# Patient Record
Sex: MALE | Race: WHITE | NOT HISPANIC OR LATINO | Employment: FULL TIME | URBAN - METROPOLITAN AREA
[De-identification: names, ages, dates, MRNs, and addresses within clinical notes are randomized per-mention and may not be internally consistent; named-entity substitution may affect disease eponyms.]

---

## 2017-01-06 ENCOUNTER — RADIANT APPOINTMENT (OUTPATIENT)
Dept: GENERAL RADIOLOGY | Facility: CLINIC | Age: 22
End: 2017-01-06
Attending: FAMILY MEDICINE
Payer: COMMERCIAL

## 2017-01-06 ENCOUNTER — OFFICE VISIT (OUTPATIENT)
Dept: FAMILY MEDICINE | Facility: CLINIC | Age: 22
End: 2017-01-06
Payer: COMMERCIAL

## 2017-01-06 VITALS
HEIGHT: 70 IN | OXYGEN SATURATION: 97 % | DIASTOLIC BLOOD PRESSURE: 70 MMHG | TEMPERATURE: 98 F | BODY MASS INDEX: 21.67 KG/M2 | HEART RATE: 75 BPM | WEIGHT: 151.4 LBS | SYSTOLIC BLOOD PRESSURE: 109 MMHG

## 2017-01-06 DIAGNOSIS — M43.6 NECK STIFFNESS: Primary | ICD-10-CM

## 2017-01-06 DIAGNOSIS — Z23 NEED FOR PROPHYLACTIC VACCINATION AND INOCULATION AGAINST INFLUENZA: ICD-10-CM

## 2017-01-06 PROCEDURE — 99213 OFFICE O/P EST LOW 20 MIN: CPT | Mod: 25 | Performed by: FAMILY MEDICINE

## 2017-01-06 PROCEDURE — 90686 IIV4 VACC NO PRSV 0.5 ML IM: CPT | Performed by: FAMILY MEDICINE

## 2017-01-06 PROCEDURE — 90471 IMMUNIZATION ADMIN: CPT | Performed by: FAMILY MEDICINE

## 2017-01-06 PROCEDURE — 72040 X-RAY EXAM NECK SPINE 2-3 VW: CPT

## 2017-01-06 ASSESSMENT — ANXIETY QUESTIONNAIRES
2. NOT BEING ABLE TO STOP OR CONTROL WORRYING: NOT AT ALL
1. FEELING NERVOUS, ANXIOUS, OR ON EDGE: SEVERAL DAYS
GAD7 TOTAL SCORE: 4
3. WORRYING TOO MUCH ABOUT DIFFERENT THINGS: NOT AT ALL
7. FEELING AFRAID AS IF SOMETHING AWFUL MIGHT HAPPEN: NOT AT ALL
5. BEING SO RESTLESS THAT IT IS HARD TO SIT STILL: NOT AT ALL
IF YOU CHECKED OFF ANY PROBLEMS ON THIS QUESTIONNAIRE, HOW DIFFICULT HAVE THESE PROBLEMS MADE IT FOR YOU TO DO YOUR WORK, TAKE CARE OF THINGS AT HOME, OR GET ALONG WITH OTHER PEOPLE: NOT DIFFICULT AT ALL
6. BECOMING EASILY ANNOYED OR IRRITABLE: SEVERAL DAYS

## 2017-01-06 ASSESSMENT — PATIENT HEALTH QUESTIONNAIRE - PHQ9: 5. POOR APPETITE OR OVEREATING: MORE THAN HALF THE DAYS

## 2017-01-06 NOTE — PROGRESS NOTES
"        SUBJECTIVE:                                                    Carrillo Aceves is a 21 year old male who presents to clinic today for the following health issues:      Feels like disc in neck has slipped forward--no pain associated with this         SUBJECTIVE:  Here today for some stiffness in his neck as above. The patient states he has no history of injury. Has been noting this for the past year. He feels as though his cervical vertebrae are not aligned and it's affecting range of motion, primarily backwards flexion. He really doesn't have any pain. Certainly nothing radiating down into his upper extremities.    Review of systems otherwise negative.  Past medical, family, and social history reviewed and updated in chart.    OBJECTIVE:  /70 mmHg  Pulse 75  Temp(Src) 98  F (36.7  C)  Ht 1.772 m (5' 9.75\")  Wt 68.675 kg (151 lb 6.4 oz)  BMI 21.87 kg/m2  SpO2 97%  Alert, pleasant, upbeat, and in no apparent discomfort.   Affect is a bit odd - somewhat flat  Neck - he has prominence to his T1 spinous process and the difference between this and C7 are what he is noting as \"slipping\"  But range of motion, including rotation, tilt, flexion seems fairly normal.  Upper extremities - normal CMS     XRAY - my independent reading of the films showed totally normal alignment of vertebral bodies. There is a definite step up between C7 and T1 in terms of spinous processes.     ASSESSMENT / PLAN:  (M43.6) Neck stiffness  (primary encounter diagnosis)  Comment: I discussed with the patient that this is fairly normal anatomy. He may have a slightly exaggerated difference between C7 and T1, but is difficult to know if this is what is led to some of the neck stiffness. We talked about potential options and I think he would primarily benefit from chiropractic therapy.  Plan: XR Cervical Spine 2/3 Views, CHIROPRACTIC         REFERRAL            (Z23) Need for prophylactic vaccination and inoculation against " influenza  Comment:   Plan: FLU VAC, SPLIT VIRUS IM > 3 YO (QUADRIVALENT)         [68595], Vaccine Administration, Initial         [07058]            Follow up as needed   SChadwick Cardona MD    (Chart documentation completed in part with Dragon voice-recognition software.  Even though reviewed some grammatical, spelling, and word errors may remain.)     Injectable Influenza Immunization Documentation    1.  Is the person to be vaccinated sick today?  No    2. Does the person to be vaccinated have an allergy to eggs or to a component of the vaccine?  No    3. Has the person to be vaccinated today ever had a serious reaction to influenza vaccine in the past?  No    4. Has the person to be vaccinated ever had Guillain-[place] syndrome?  No     Form completed by Cami Joseph CMA  January 6, 2017 10:27 AM

## 2017-01-07 ASSESSMENT — PATIENT HEALTH QUESTIONNAIRE - PHQ9: SUM OF ALL RESPONSES TO PHQ QUESTIONS 1-9: 5

## 2017-01-07 ASSESSMENT — ANXIETY QUESTIONNAIRES: GAD7 TOTAL SCORE: 4

## 2019-01-07 ENCOUNTER — OFFICE VISIT (OUTPATIENT)
Dept: FAMILY MEDICINE | Facility: CLINIC | Age: 24
End: 2019-01-07
Payer: COMMERCIAL

## 2019-01-07 VITALS
TEMPERATURE: 99.1 F | RESPIRATION RATE: 16 BRPM | SYSTOLIC BLOOD PRESSURE: 110 MMHG | WEIGHT: 160.3 LBS | HEIGHT: 69 IN | HEART RATE: 104 BPM | DIASTOLIC BLOOD PRESSURE: 68 MMHG | BODY MASS INDEX: 23.74 KG/M2

## 2019-01-07 DIAGNOSIS — Z23 NEED FOR VACCINATION: ICD-10-CM

## 2019-01-07 DIAGNOSIS — Z00.00 ROUTINE HISTORY AND PHYSICAL EXAMINATION OF ADULT: Primary | ICD-10-CM

## 2019-01-07 DIAGNOSIS — F33.1 MODERATE EPISODE OF RECURRENT MAJOR DEPRESSIVE DISORDER (H): ICD-10-CM

## 2019-01-07 LAB
CHOLEST SERPL-MCNC: 110 MG/DL
HBA1C MFR BLD: 4.6 % (ref 0–5.6)
HDLC SERPL-MCNC: 47 MG/DL
LDLC SERPL CALC-MCNC: 21 MG/DL
NONHDLC SERPL-MCNC: 63 MG/DL
TRIGL SERPL-MCNC: 209 MG/DL
TSH SERPL DL<=0.005 MIU/L-ACNC: 1.05 MU/L (ref 0.4–4)

## 2019-01-07 PROCEDURE — 90651 9VHPV VACCINE 2/3 DOSE IM: CPT | Performed by: PHYSICIAN ASSISTANT

## 2019-01-07 PROCEDURE — 83036 HEMOGLOBIN GLYCOSYLATED A1C: CPT | Performed by: PHYSICIAN ASSISTANT

## 2019-01-07 PROCEDURE — 99214 OFFICE O/P EST MOD 30 MIN: CPT | Mod: 25 | Performed by: PHYSICIAN ASSISTANT

## 2019-01-07 PROCEDURE — 90715 TDAP VACCINE 7 YRS/> IM: CPT | Performed by: PHYSICIAN ASSISTANT

## 2019-01-07 PROCEDURE — 90472 IMMUNIZATION ADMIN EACH ADD: CPT | Performed by: PHYSICIAN ASSISTANT

## 2019-01-07 PROCEDURE — 80061 LIPID PANEL: CPT | Performed by: PHYSICIAN ASSISTANT

## 2019-01-07 PROCEDURE — 90471 IMMUNIZATION ADMIN: CPT | Performed by: PHYSICIAN ASSISTANT

## 2019-01-07 PROCEDURE — 84443 ASSAY THYROID STIM HORMONE: CPT | Performed by: PHYSICIAN ASSISTANT

## 2019-01-07 PROCEDURE — 36415 COLL VENOUS BLD VENIPUNCTURE: CPT | Performed by: PHYSICIAN ASSISTANT

## 2019-01-07 PROCEDURE — 99395 PREV VISIT EST AGE 18-39: CPT | Mod: 25 | Performed by: PHYSICIAN ASSISTANT

## 2019-01-07 ASSESSMENT — ENCOUNTER SYMPTOMS
NERVOUS/ANXIOUS: 1
PALPITATIONS: 1
NAUSEA: 1

## 2019-01-07 ASSESSMENT — ANXIETY QUESTIONNAIRES
7. FEELING AFRAID AS IF SOMETHING AWFUL MIGHT HAPPEN: NEARLY EVERY DAY
5. BEING SO RESTLESS THAT IT IS HARD TO SIT STILL: NEARLY EVERY DAY
IF YOU CHECKED OFF ANY PROBLEMS ON THIS QUESTIONNAIRE, HOW DIFFICULT HAVE THESE PROBLEMS MADE IT FOR YOU TO DO YOUR WORK, TAKE CARE OF THINGS AT HOME, OR GET ALONG WITH OTHER PEOPLE: EXTREMELY DIFFICULT
6. BECOMING EASILY ANNOYED OR IRRITABLE: NEARLY EVERY DAY
1. FEELING NERVOUS, ANXIOUS, OR ON EDGE: NEARLY EVERY DAY
3. WORRYING TOO MUCH ABOUT DIFFERENT THINGS: MORE THAN HALF THE DAYS
GAD7 TOTAL SCORE: 19
2. NOT BEING ABLE TO STOP OR CONTROL WORRYING: MORE THAN HALF THE DAYS

## 2019-01-07 ASSESSMENT — PATIENT HEALTH QUESTIONNAIRE - PHQ9
SUM OF ALL RESPONSES TO PHQ QUESTIONS 1-9: 18
10. IF YOU CHECKED OFF ANY PROBLEMS, HOW DIFFICULT HAVE THESE PROBLEMS MADE IT FOR YOU TO DO YOUR WORK, TAKE CARE OF THINGS AT HOME, OR GET ALONG WITH OTHER PEOPLE: EXTREMELY DIFFICULT
SUM OF ALL RESPONSES TO PHQ QUESTIONS 1-9: 18
5. POOR APPETITE OR OVEREATING: NEARLY EVERY DAY

## 2019-01-07 ASSESSMENT — MIFFLIN-ST. JEOR: SCORE: 1723.99

## 2019-01-07 ASSESSMENT — PAIN SCALES - GENERAL: PAINLEVEL: NO PAIN (0)

## 2019-01-07 NOTE — PROGRESS NOTES
"SUBJECTIVE:   CC: Carrillo Aceves is an 23 year old male who presents for preventative health visit.     HPI    Depression and Anxiety Follow-Up-  Inpatient treatment at age 18 for depression.   Started with skipping school in middle school, high anxiety at school, too fatigued to get up for school. In high school only going 50% of days.   Admitted age 18 for depression - [facility].   No substance use or alcohol at the time.   No suicide attempts.  No hx of sujit or bipolar.  fam hx: sister -paranoid depression.  Last time on medications- 2015- prozac. Only took a few days and then stopped.    Recent hx: was going to  [employer] for a few yrs. Transferred to [employer]  Working: at [employer].   Hard time getting out of bed- due to depression and mood sx. Sleeping 12-14hrs then just laying in bed all day.   Missing classes- failed 1 class and dropped others  Quit his job- late Nov 2018.   Living with family- dad and step mom currently.  Some temp jobs irregularly.. Planning on returning to school spring semester. Studying math..     Meds that have worked in the past- \"I don't have a clear memory of what has worked. Not sure.\"  Felt he was managing sx fall 2017 and spring 2018 without meds- passing classes, mood ok.        Status since last visit: Worsened more recently    Other associated symptoms:None    Complicating factors:     Significant life event: Yes-  Moved in with family, starting working more and going to college. (math)     Current substance abuse: None    PHQ 1/6/2017 1/7/2019   PHQ-9 Total Score 5 18   Q9: Suicide Ideation Not at all Not at all     DORY-7 SCORE 2/3/2015 1/6/2017 1/7/2019   Total Score 4 - -   Total Score - 4 19       PHQ-9  English  PHQ-9   Any Language  DORY-7  Suicide Assessment Five-step Evaluation and Treatment (SAFE-T)    Today's PHQ-2 Score:   PHQ-2 ( 1999 Pfizer) 1/7/2019   Q1: Little interest or pleasure in doing things 3   Q2: Feeling down, depressed or hopeless 3   PHQ-2 Score 6   Q1: " Little interest or pleasure in doing things Nearly every day   Q2: Feeling down, depressed or hopeless Nearly every day   PHQ-2 Score 6       Abuse: Current or Past(Physical, Sexual or Emotional)- No  Do you feel safe in your environment? No    Social History     Tobacco Use     Smoking status: Never Smoker     Smokeless tobacco: Never Used   Substance Use Topics     Alcohol use: No     Alcohol Use 1/7/2019   If you drink alcohol do you typically have greater than 3 drinks per day OR greater than 7 drinks per week? No     Last PSA: No results found for: PSA    Reviewed orders with patient. Reviewed health maintenance and updated orders accordingly - Yes  Labs reviewed in EPIC  BP Readings from Last 3 Encounters:   01/07/19 110/68   01/06/17 109/70 (10 %/ 43 %)*   05/16/16 126/77 (67 %/ 77 %)*     *BP percentiles are based on the August 2017 AAP Clinical Practice Guideline for boys    Wt Readings from Last 3 Encounters:   01/07/19 72.7 kg (160 lb 4.8 oz)   01/06/17 68.7 kg (151 lb 6.4 oz) (44 %)*   05/16/16 65.8 kg (145 lb) (37 %)*     * Growth percentiles are based on CDC (Boys, 2-20 Years) data.                  Patient Active Problem List   Diagnosis     Vitamin D deficiency     Mild major depression (H)     Health Care Home     Acne cystica     Past Surgical History:   Procedure Laterality Date     NO HISTORY OF SURGERY         Social History     Tobacco Use     Smoking status: Never Smoker     Smokeless tobacco: Never Used   Substance Use Topics     Alcohol use: No     Comment: none for past 8 months (as of 01/2019)     Family History   Problem Relation Age of Onset     Lipids Father      Hypertension Mother      Thyroid Disease Mother      Breast Cancer Maternal Grandmother      Cancer Paternal Grandfather      Depression/Anxiety Sister      Asthma No family hx of      C.A.D. No family hx of      Diabetes No family hx of      Cerebrovascular Disease No family hx of      Cancer - colorectal No family hx of       "Prostate Cancer No family hx of      Alcohol/Drug No family hx of      Allergies No family hx of      Alzheimer Disease No family hx of      Anesthesia Reaction No family hx of      Arthritis No family hx of      Blood Disease No family hx of      Cardiovascular No family hx of      Circulatory No family hx of      Congenital Anomalies No family hx of      Connective Tissue Disorder No family hx of          No current outpatient medications on file.     No Known Allergies    Reviewed and updated as needed this visit by clinical staff  Tobacco  Allergies  Meds  Med Hx  Surg Hx  Fam Hx  Soc Hx        Reviewed and updated as needed this visit by Provider            Review of Systems  CONSTITUTIONAL: NEGATIVE for fever, chills, change in weight  INTEGUMENTARY/SKIN: NEGATIVE for worrisome rashes, moles or lesions  EYES: NEGATIVE for vision changes or irritation  ENT: NEGATIVE for ear, mouth and throat problems  RESP: NEGATIVE for significant cough or SOB  CV: NEGATIVE for chest pain, palpitations or peripheral edema  GI: NEGATIVE for nausea, abdominal pain, heartburn, or change in bowel habits   male: negative for dysuria, hematuria, decreased urinary stream, erectile dysfunction, urethral discharge. Declines STD testing.  MUSCULOSKELETAL: NEGATIVE for significant arthralgias or myalgia  NEURO: NEGATIVE for weakness, dizziness or paresthesias  PSYCHIATRIC: POS as above    OBJECTIVE:   /68   Pulse 104   Temp 99.1  F (37.3  C) (Temporal)   Resp 16   Ht 1.755 m (5' 9.09\")   Wt 72.7 kg (160 lb 4.8 oz)   BMI 23.61 kg/m      Physical Exam  GENERAL: healthy, alert and no distress  EYES: Eyes grossly normal to inspection, PERRL and conjunctivae and sclerae normal  HENT: ear canals and TM's normal, nose and mouth without ulcers or lesions  NECK: no adenopathy, no asymmetry, masses, or scars and thyroid normal to palpation  RESP: lungs clear to auscultation - no rales, rhonchi or wheezes  CV: regular rate and " rhythm, normal S1 S2, no S3 or S4, no murmur, click or rub, no peripheral edema and peripheral pulses strong  ABDOMEN: soft, nontender, no hepatosplenomegaly, no masses and bowel sounds normal   (male): normal male genitalia without lesions or urethral discharge, no hernia  MS: no gross musculoskeletal defects noted, no edema  SKIN: no suspicious lesions or rashes  NEURO: Normal strength and tone, mentation intact and speech normal  PSYCH: mentation appears normal, affect flat. Eye contact intermittent. Neatly groomed, casually dressed. No agitation. No SI/HI.   LYMPH: normal ant/post cervical, supraclavicular nodes    Diagnostic Test Results:  Results for orders placed or performed in visit on 01/07/19 (from the past 24 hour(s))   Lipid panel reflex to direct LDL Non-fasting   Result Value Ref Range    Cholesterol 110 <200 mg/dL    Triglycerides 209 (H) <150 mg/dL    HDL Cholesterol 47 >39 mg/dL    LDL Cholesterol Calculated 21 <100 mg/dL    Non HDL Cholesterol 63 <130 mg/dL   TSH with free T4 reflex   Result Value Ref Range    TSH 1.05 0.40 - 4.00 mU/L   Hemoglobin A1c   Result Value Ref Range    Hemoglobin A1C 4.6 0 - 5.6 %       ASSESSMENT/PLAN:   1. Routine history and physical examination of adult  Non-fasting labs as above.  Screen thryoid with mental health sx.   See counseling messages as below  - Lipid panel reflex to direct LDL Non-fasting  - TSH with free T4 reflex  - Hemoglobin A1c    2. Moderate episode of recurrent major depressive disorder (H)  Reviewed notes from [facility] providers from 2015.   Significant depression impairing work/school function.  Discussed SSRI medication options, possible side effects, how to take, risks vs benefits, onset of sx improvement and alternatives.   Counseling referral discussed- he is open to counseling  Questions answered  Start medication as below.   Follow up 3-4 weeks sooner if worsening. Crises resources discussed.  - FLUoxetine (PROZAC) 20 MG  "capsule; Take 1 capsule (20 mg) by mouth daily  Dispense: 30 capsule; Refill: 1  - MENTAL HEALTH REFERRAL  - Adult; Outpatient Treatment; Individual/Couples/Family/Group Therapy/Health Psychology; Other: [facility] [phone]; We will contact you to schedule the appointment or please call with any questi...    3. Need for vaccination  Counseled on immunizations- he would like to do tetanus and HPV   - TDAP VACCINE (ADACEL)  - HPV, IM (9 - 26 YRS) - Gardasil 9  - ADMIN 1st VACCINE  - EA ADD'L VACCINE    COUNSELING:   Reviewed preventive health counseling, as reflected in patient instructions       Regular exercise       Healthy diet/nutrition       Safe sex practices/STD prevention       STD screening- declined    BP Readings from Last 1 Encounters:   01/06/17 109/70 (10 %/ 43 %)*     *BP percentiles are based on the August 2017 AAP Clinical Practice Guideline for boys     Estimated body mass index is 23.61 kg/m  as calculated from the following:    Height as of this encounter: 1.755 m (5' 9.09\").    Weight as of this encounter: 72.7 kg (160 lb 4.8 oz).           reports that  has never smoked. he has never used smokeless tobacco.      Counseling Resources:  ATP IV Guidelines  Pooled Cohorts Equation Calculator  FRAX Risk Assessment  ICSI Preventive Guidelines  Dietary Guidelines for Americans, 2010  USDA's MyPlate  ASA Prophylaxis  Lung CA Screening    Yamini Jernigan PA-C  [facility] [place]  Answers for HPI/ROS submitted by the patient on 1/7/2019   Annual Exam:  If you checked off any problems, how difficult have these problems made it for you to do your work, take care of things at home, or get along with other people?: Extremely difficult  PHQ9 TOTAL SCORE: 18    "

## 2019-01-07 NOTE — NURSING NOTE
Screening Questionnaire for Adult Immunization    Are you sick today?   No   Do you have allergies to medications, food, a vaccine component or latex?   No   Have you ever had a serious reaction after receiving a vaccination?   No   Do you have a long-term health problem with heart disease, lung disease, asthma, kidney disease, metabolic disease (e.g. diabetes), anemia, or other blood disorder?   No   Do you have cancer, leukemia, HIV/AIDS, or any other immune system problem?   No   In the past 3 months, have you taken medications that affect  your immune system, such as prednisone, other steroids, or anticancer drugs; drugs for the treatment of rheumatoid arthritis, Crohn s disease, or psoriasis; or have you had radiation treatments?   No   Have you had a seizure, or a brain or other nervous system problem?   No   During the past year, have you received a transfusion of blood or blood     products, or been given immune (gamma) globulin or antiviral drug?   No   For women: Are you pregnant or is there a chance you could become        pregnant during the next month?   No   Have you received any vaccinations in the past 4 weeks?   No     Immunization questionnaire answers were all negative.        Per orders of Dr. Yamini Jernigan, injection of Tdap and Hpv given by Weston Orozco. Patient instructed to remain in clinic for 15 minutes afterwards, and to report any adverse reaction to me immediately.       Screening performed by Weston Orozco on 1/7/2019 at 11:58 AM.

## 2019-01-07 NOTE — PATIENT INSTRUCTIONS
You have been prescribed a medication to help with mood or anxiety: Fluoxetine (Prozac).    Take this medication once daily.     To help reduce side effects, you may want to take a half tablet (half dose) daily for the first few days.     Common side effects are nausea, headache, stomach upset or mild diarrhea. If you find it makes you sleepy, plan to take it at bedtime. Most of these side effects gradually improve over the first week of use.     It can take up to 2 weeks to notice initial benefits from the medication (ie increase in energy), and up to 4 weeks for other symptom improvement.     If you experience a worsening in mood or thoughts of self harm, seek help immediately.    Plan to follow up in 3-4 weeks.    --    Psychiatrists/Psychologists/Therapists:    [facility]-   [address]  [address]  [facility] [address] [phone]    Dao Carney  [address] [address], [place] [id number] [phone]    [facility]  [address]  [facility] [id number] [phone]    [facility]:  MICHAEL Galeas   Phone: [phone]   Fax:[phone]     [facility]:  [facility] [place]  [address] [place] [address]  Ph: [phone]  Fax: [phone]    [facility]):  Luli Wei [place], [place], [place]  [phone]    [facility]:  [facility]  Phone: [phone]  Fax: [phone]      CRISIS evaluation: nearest ER or [facility]

## 2019-01-09 ASSESSMENT — ANXIETY QUESTIONNAIRES: GAD7 TOTAL SCORE: 19

## 2019-01-28 NOTE — PROGRESS NOTES
"  SUBJECTIVE:   Carrillo Aceves is a 23 year old male who presents to clinic today for the following health issues:      History of Present Illness     Depression & Anxiety Follow-up:     Depression/Anxiety:  Depression & Anxiety    Status since last visit::  Improved    Other associated symptoms of depression and anxiety::  YES    Significant life event::  No    Current substance use::  None       Today's PHQ-9         PHQ-9 Total Score:     (P) 10   PHQ-9 Q9 Suicidal ideation:   (P) Not at all   Thoughts of suicide or self harm:      Self-harm Plan:        Self-harm Action:          Safety concerns for self or others:       DORY-7 Total Score: (P) 8    Diet:  Regular (no restrictions)  Frequency of exercise:  2-3 days/week  Duration of exercise:  15-30 minutes  Taking medications regularly:  Yes  Medication side effects:  Lightheadedness and Other  Additional concerns today:  No    Pt was seen 01/07/2019 for an annual exam and for depression and anxiety.  Was having significant impairment in functioning related to mood. Not getting out of bed, missing work/school.  Restarted prozac at 20mg daily.   He took it for 3 weeks. He had side effects of nausea and he \"felt robotic\" when taking it. [place] like he had little motivation to do things he needed to do. Felt lethargic. Those sx improved with stopping the fluoxetine.   He has been seeing a therapist for about 4 weeks. Has had 3-4 sessions now. He thinks that has been very helpful.  They discussed this week that some of his sx might not be from depression but from personality disorder.   Therapist- Robert Russ in [place].   He is interested in \"full psychiatric evaluation\".   Thinks he understands sx bit better.   Also he has been going to school daily and working. Not missing appts now.   No worsening sx.   Right now does not want to try a different med. Maybe in the future. He would like to stay with therapy.  No SI/HI.   Sleep- thought fluoxetine was making it " hard to fall asleep. Falling asleep more easily.   No unhealthy coping.  Mood pretty good past few weeks.     PHQ-9 SCORE 1/6/2017 1/7/2019 2/7/2019   PHQ-9 Total Score - - -   PHQ-9 Total Score MyChart - 18 (Moderately severe depression) 10 (Moderate depression)   PHQ-9 Total Score 5 18 10     DORY-7 SCORE 1/6/2017 1/7/2019 2/7/2019   Total Score - - -   Total Score - - 8 (mild anxiety)   Total Score 4 19 8       Problem list and histories reviewed & adjusted, as indicated.  Additional history: as documented      Patient Active Problem List   Diagnosis     Vitamin D deficiency     Mild major depression (H)     Health Care Home     Acne cystica     Past Surgical History:   Procedure Laterality Date     NO HISTORY OF SURGERY         Social History     Tobacco Use     Smoking status: Never Smoker     Smokeless tobacco: Never Used   Substance Use Topics     Alcohol use: No     Comment: none for past 8 months (as of 01/2019)     Family History   Problem Relation Age of Onset     Lipids Father      Hypertension Mother      Thyroid Disease Mother      Breast Cancer Maternal Grandmother      Cancer Paternal Grandfather      Depression/Anxiety Sister      Asthma No family hx of      C.A.D. No family hx of      Diabetes No family hx of      Cerebrovascular Disease No family hx of      Cancer - colorectal No family hx of      Prostate Cancer No family hx of      Alcohol/Drug No family hx of      Allergies No family hx of      Alzheimer Disease No family hx of      Anesthesia Reaction No family hx of      Arthritis No family hx of      Blood Disease No family hx of      Cardiovascular No family hx of      Circulatory No family hx of      Congenital Anomalies No family hx of      Connective Tissue Disorder No family hx of          No current outpatient medications on file.     No Known Allergies  BP Readings from Last 3 Encounters:   02/07/19 100/56   01/07/19 110/68   01/06/17 109/70 (10 %/ 43 %)*     *BP percentiles are based  "on the August 2017 AAP Clinical Practice Guideline for boys    Wt Readings from Last 3 Encounters:   02/07/19 72.6 kg (160 lb)   01/07/19 72.7 kg (160 lb 4.8 oz)   01/06/17 68.7 kg (151 lb 6.4 oz) (44 %)*     * Growth percentiles are based on [facility] (Boys, 2-20 Years) data.                  Labs reviewed in EPIC    ROS:  Constitutional, HEENT, cardiovascular, pulmonary, gi and gu systems are negative, except as otherwise noted.    OBJECTIVE:     /56   Pulse 84   Temp 98.5  F (36.9  C) (Oral)   Resp 16   Ht 1.753 m (5' 9\")   Wt 72.6 kg (160 lb)   BMI 23.63 kg/m    Body mass index is 23.63 kg/m .  GENERAL: healthy, alert and no distress  NEURO: Normal strength and tone, mentation intact and speech normal  PSYCH: mentation appears normal, affect normal, mildly flat. Eye contact strong. Speech normal volume, content. No SI/HI or agitation.     Diagnostic Test Results:  No results found for this or any previous visit (from the past 24 hour(s)).    ASSESSMENT/PLAN:     1. Mild major depression (H)  Patient has had improvement in sx and in function, likely from a combination of medications and therapy.   He would like to stay off medications at this time due to side effects and continue with therapy for management of sx.  He may consider a psychiatric consult but he wishes to obtain referral from his therapist for that.   Discussed recheck in 3-6mo, sooner if he desires to retry another medication.     Follow Up: The patient was instructed to contact clinic for worsening symptoms, non-improvement as expected/discussed, and for questions regarding medications or treatment plan. Discussed parameters for follow up and included in After Visit Summary given to patient.      Yamini Jernigan PA-C  [facility]  "

## 2019-02-07 ENCOUNTER — OFFICE VISIT (OUTPATIENT)
Dept: FAMILY MEDICINE | Facility: CLINIC | Age: 24
End: 2019-02-07
Payer: COMMERCIAL

## 2019-02-07 VITALS
WEIGHT: 160 LBS | TEMPERATURE: 98.5 F | DIASTOLIC BLOOD PRESSURE: 56 MMHG | HEIGHT: 69 IN | SYSTOLIC BLOOD PRESSURE: 100 MMHG | BODY MASS INDEX: 23.7 KG/M2 | HEART RATE: 84 BPM | RESPIRATION RATE: 16 BRPM

## 2019-02-07 DIAGNOSIS — F32.0 MILD MAJOR DEPRESSION (H): Primary | ICD-10-CM

## 2019-02-07 PROCEDURE — 99213 OFFICE O/P EST LOW 20 MIN: CPT | Performed by: PHYSICIAN ASSISTANT

## 2019-02-07 ASSESSMENT — ANXIETY QUESTIONNAIRES
4. TROUBLE RELAXING: MORE THAN HALF THE DAYS
GAD7 TOTAL SCORE: 8
GAD7 TOTAL SCORE: 8
5. BEING SO RESTLESS THAT IT IS HARD TO SIT STILL: MORE THAN HALF THE DAYS
1. FEELING NERVOUS, ANXIOUS, OR ON EDGE: SEVERAL DAYS
2. NOT BEING ABLE TO STOP OR CONTROL WORRYING: SEVERAL DAYS
7. FEELING AFRAID AS IF SOMETHING AWFUL MIGHT HAPPEN: NOT AT ALL
7. FEELING AFRAID AS IF SOMETHING AWFUL MIGHT HAPPEN: NOT AT ALL
3. WORRYING TOO MUCH ABOUT DIFFERENT THINGS: SEVERAL DAYS
GAD7 TOTAL SCORE: 8
6. BECOMING EASILY ANNOYED OR IRRITABLE: SEVERAL DAYS

## 2019-02-07 ASSESSMENT — PATIENT HEALTH QUESTIONNAIRE - PHQ9
SUM OF ALL RESPONSES TO PHQ QUESTIONS 1-9: 10
10. IF YOU CHECKED OFF ANY PROBLEMS, HOW DIFFICULT HAVE THESE PROBLEMS MADE IT FOR YOU TO DO YOUR WORK, TAKE CARE OF THINGS AT HOME, OR GET ALONG WITH OTHER PEOPLE: SOMEWHAT DIFFICULT
SUM OF ALL RESPONSES TO PHQ QUESTIONS 1-9: 10

## 2019-02-07 ASSESSMENT — PAIN SCALES - GENERAL: PAINLEVEL: NO PAIN (0)

## 2019-02-07 ASSESSMENT — MIFFLIN-ST. JEOR: SCORE: 1721.14

## 2019-02-08 ASSESSMENT — PATIENT HEALTH QUESTIONNAIRE - PHQ9: SUM OF ALL RESPONSES TO PHQ QUESTIONS 1-9: 10

## 2019-02-08 ASSESSMENT — ANXIETY QUESTIONNAIRES: GAD7 TOTAL SCORE: 8

## 2019-09-05 ENCOUNTER — TELEPHONE (OUTPATIENT)
Dept: FAMILY MEDICINE | Facility: CLINIC | Age: 24
End: 2019-09-05

## 2019-09-05 NOTE — LETTER
[facility]  [address]  [place] [address]  Phone: [phone]  Fax: [phone]  September 24, 2019      Carrillo Aceves  [address] [place] [facility] [place] [facility] [address]      Dear Carrillo,    We care about your health and have reviewed your health plan including your medical conditions, medications, and lab results.  Based on this review, it is recommended that you follow up regarding the following health topic(s):  -Depression    We recommend you take the following action(s):  -schedule a FOLLOWUP APPOINTMENT.  -Complete and return the attached PHQ-9 Form.  If your total score is greater than 9, please schedule a followup appointment.  If you answer Yes to question 9, call your clinic between the hours of 8 to 5.  You may also call the Suicide Hotline at 7-750-768-WTHM (5645) any time.     Please call us at the [facility] - [phone] (or use [place]) to address the above recommendations.     Thank you for trusting [facility] and we appreciate the opportunity to serve you.  We look forward to supporting your healthcare needs in the future.    Healthy Regards,    Your Health Care Team  [facility]

## 2019-09-05 NOTE — TELEPHONE ENCOUNTER
Summary:    Patient is due/failing the following:   Depression follow up  and PHQ9    Action needed:   Patient needs office visit for Depression follow up . and Patient needs to do PHQ9.    Type of outreach:    Phone, left message for patient to call back.     Questions for provider review:    None                                                                                                                                    Linette Matthews [facility]       Chart routed to Care Team .          Panel Management Review      Patient has the following on his problem list:     Depression / Dysthymia review    Measure:  Needs PHQ-9 score of 4 or less during index window.  Administer PHQ-9 and if score is 5 or more, send encounter to provider for next steps.        PHQ-9 SCORE 1/6/2017 1/7/2019 2/7/2019   PHQ-9 Total Score - - -   PHQ-9 Total Score MyChart - 18 (Moderately severe depression) 10 (Moderate depression)   PHQ-9 Total Score 5 18 10       If PHQ-9 recheck is 5 or more, route to provider for next steps.    Patient is due for:  PHQ9      Composite cancer screening  Chart review shows that this patient is due/due soon for the following None

## 2019-11-08 ENCOUNTER — HEALTH MAINTENANCE LETTER (OUTPATIENT)
Age: 24
End: 2019-11-08

## 2020-02-02 NOTE — NURSING NOTE
"Chief Complaint   Patient presents with     Consult     check disc in neck-feels like it slid forward-not having pain       Initial /70 mmHg  Pulse 75  Temp(Src) 98  F (36.7  C)  Ht 1.772 m (5' 9.75\")  Wt 68.675 kg (151 lb 6.4 oz)  BMI 21.87 kg/m2  SpO2 97% Estimated body mass index is 21.87 kg/(m^2) as calculated from the following:    Height as of this encounter: 1.772 m (5' 9.75\").    Weight as of this encounter: 68.675 kg (151 lb 6.4 oz).  BP completed using cuff size: alannah Joseph CMA  January 6, 2017 10:26 AM        "
Patent

## 2020-02-23 ENCOUNTER — HEALTH MAINTENANCE LETTER (OUTPATIENT)
Age: 25
End: 2020-02-23

## 2020-05-11 ENCOUNTER — VIRTUAL VISIT (OUTPATIENT)
Dept: FAMILY MEDICINE | Facility: OTHER | Age: 25
End: 2020-05-11
Payer: COMMERCIAL

## 2020-05-11 DIAGNOSIS — F32.0 MILD MAJOR DEPRESSION (H): ICD-10-CM

## 2020-05-11 DIAGNOSIS — F41.1 GAD (GENERALIZED ANXIETY DISORDER): Primary | ICD-10-CM

## 2020-05-11 PROCEDURE — 96127 BRIEF EMOTIONAL/BEHAV ASSMT: CPT | Mod: TEL | Performed by: PHYSICIAN ASSISTANT

## 2020-05-11 PROCEDURE — 99214 OFFICE O/P EST MOD 30 MIN: CPT | Mod: TEL | Performed by: PHYSICIAN ASSISTANT

## 2020-05-11 ASSESSMENT — ANXIETY QUESTIONNAIRES
IF YOU CHECKED OFF ANY PROBLEMS ON THIS QUESTIONNAIRE, HOW DIFFICULT HAVE THESE PROBLEMS MADE IT FOR YOU TO DO YOUR WORK, TAKE CARE OF THINGS AT HOME, OR GET ALONG WITH OTHER PEOPLE: VERY DIFFICULT
7. FEELING AFRAID AS IF SOMETHING AWFUL MIGHT HAPPEN: NOT AT ALL
3. WORRYING TOO MUCH ABOUT DIFFERENT THINGS: SEVERAL DAYS
5. BEING SO RESTLESS THAT IT IS HARD TO SIT STILL: MORE THAN HALF THE DAYS
2. NOT BEING ABLE TO STOP OR CONTROL WORRYING: MORE THAN HALF THE DAYS
1. FEELING NERVOUS, ANXIOUS, OR ON EDGE: NEARLY EVERY DAY
GAD7 TOTAL SCORE: 14
6. BECOMING EASILY ANNOYED OR IRRITABLE: NEARLY EVERY DAY

## 2020-05-11 ASSESSMENT — PATIENT HEALTH QUESTIONNAIRE - PHQ9
5. POOR APPETITE OR OVEREATING: NEARLY EVERY DAY
SUM OF ALL RESPONSES TO PHQ QUESTIONS 1-9: 9

## 2020-05-11 NOTE — PROGRESS NOTES
"Carrillo Aceves is a 25 year old male who is being evaluated via a billable telephone visit.      The patient has been notified of following:     \"This telephone visit will be conducted via a call between you and your physician/provider. We have found that certain health care needs can be provided without the need for a physical exam.  This service lets us provide the care you need with a short phone conversation.  If a prescription is necessary we can send it directly to your pharmacy.  If lab work is needed we can place an order for that and you can then stop by our lab to have the test done at a later time.    Telephone visits are billed at different rates depending on your insurance coverage. During this emergency period, for some insurers they may be billed the same as an in-person visit.  Please reach out to your insurance provider with any questions.    If during the course of the call the physician/provider feels a telephone visit is not appropriate, you will not be charged for this service.\"    Patient has given verbal consent for Telephone visit?  Yes    What phone number would you like to be contacted at? [phone]    How would you like to obtain your AVS? Anika Monte     Carrillo Aceves is a 25 year old male who presents to clinic today for the following health issues:    HPI  Depression and Anxiety Follow-Up    How are you doing with your depression since your last visit? No change    How are you doing with your anxiety since your last visit?  Worsened     Are you having other symptoms that might be associated with depression or anxiety? No    Have you had a significant life event? No     Do you have any concerns with your use of alcohol or other drugs? No    Pt last seen over 1 year ago 02/2019 for depression. He tried prozac but had side effects and he did counseling with therapist instead. He stopped medications after a short duration. He ended up not passing his classes and was suspended from " "school. So he has been working for the last year.     He recently decided he would like to go back to school. Since making decision to go back he has had increased anxiety. He is waking at 2am and not able to go back to sleep.     He has trouble concentrating.     He feels low energy. Fatigued feeling.     Eating consistently. Varies in amount.     Coping by laying in bed more. No alcohol. No substance use.     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 He has been getting to work on time.     No SI. No hx of sujit.     Prior depression treated in 2015- [facility]. Had care with [facility] in teen years. wellbutrin- not sure helpful.    He is living with family.   Fam hx- father depression. Sister \"psychotic depression\"- stable recently.       Social History     Tobacco Use     Smoking status: Never Smoker     Smokeless tobacco: Never Used   Substance Use Topics     Alcohol use: No     Comment: none for past 8 months (as of 01/2019)     Drug use: No     PHQ 1/7/2019 2/7/2019 5/11/2020   PHQ-9 Total Score 18 10 9   Q9: Thoughts of better off dead/self-harm past 2 weeks Not at all Not at all Not at all     DORY-7 SCORE 1/7/2019 2/7/2019 5/11/2020   Total Score - - -   Total Score - 8 (mild anxiety) -   Total Score 19 8 14     Suicide Assessment Five-step Evaluation and Treatment (SAFE-T)      How many servings of fruits and vegetables do you eat daily?  0-1    On average, how many sweetened beverages do you drink each day (Examples: soda, juice, sweet tea, etc.  Do NOT count diet or artificially sweetened beverages)?   0    How many days per week do you exercise enough to make your heart beat faster? 3 or less    How many minutes a day do you exercise enough to make your heart beat faster? 30 - 60    How many days per week do you miss taking your medication? 0      Patient Active Problem List   Diagnosis     Vitamin D deficiency     Mild major depression (H)     Health Care " Home     Acne cystica     DORY (generalized anxiety disorder)     Past Surgical History:   Procedure Laterality Date     NO HISTORY OF SURGERY         Social History     Tobacco Use     Smoking status: Never Smoker     Smokeless tobacco: Never Used   Substance Use Topics     Alcohol use: No     Comment: none for past 8 months (as of 01/2019)     Family History   Problem Relation Age of Onset     Lipids Father      Hypertension Mother      Thyroid Disease Mother      Breast Cancer Maternal Grandmother      Cancer Paternal Grandfather      Depression/Anxiety Sister      Asthma No family hx of      C.A.D. No family hx of      Diabetes No family hx of      Cerebrovascular Disease No family hx of      Cancer - colorectal No family hx of      Prostate Cancer No family hx of      Alcohol/Drug No family hx of      Allergies No family hx of      Alzheimer Disease No family hx of      Anesthesia Reaction No family hx of      Arthritis No family hx of      Blood Disease No family hx of      Cardiovascular No family hx of      Circulatory No family hx of      Congenital Anomalies No family hx of      Connective Tissue Disorder No family hx of          No current outpatient medications on file.     No Known Allergies  BP Readings from Last 3 Encounters:   02/07/19 100/56   01/07/19 110/68   01/06/17 109/70    Wt Readings from Last 3 Encounters:   02/07/19 72.6 kg (160 lb)   01/07/19 72.7 kg (160 lb 4.8 oz)   01/06/17 68.7 kg (151 lb 6.4 oz) (44 %)*     * Growth percentiles are based on CDC (Boys, 2-20 Years) data.                    Reviewed and updated as needed this visit by Provider         Review of Systems   Constitutional, HEENT, cardiovascular, pulmonary, gi and gu systems are negative, except as otherwise noted.       Objective   Reported vitals:  There were no vitals taken for this visit.   healthy, alert and no distress  PSYCH: Alert and oriented times 3; coherent speech, normal   rate and volume, able to articulate  logical thoughts, able   to abstract reason, no tangential thoughts, no hallucinations   or delusions  His affect is normal and pleasant  RESP: No cough, no audible wheezing, able to talk in full sentences  Remainder of exam unable to be completed due to telephone visits    Diagnostic Test Results:  none         Assessment/Plan:  1. DORY (generalized anxiety disorder)  2. Mild major depression (H)  Discussed SSRI medication options, possible side effects, how to take, risks vs benefits, onset of sx improvement and alternatives.   Prior use of prozac (side effects), wellbutrin (not sure it helped).   Start medication as below.   Counseling referral discussed- he declined counseling at this time  Questions answered  Self cares.  Follow up 3-4 weeks sooner if worsening. Crises resources discussed.  - sertraline (ZOLOFT) 50 MG tablet; Take 1 tablet (50 mg) by mouth daily  Dispense: 30 tablet; Refill: 0    Return in about 4 weeks (around 6/8/2020).       Phone call duration:  12 minutes    Yamini Jernigan PA-C

## 2020-05-11 NOTE — PATIENT INSTRUCTIONS
You have been prescribed a medication to help with mood or anxiety: sertraline.    Take this medication once daily.     To help reduce side effects, you may want to take a half tablet (half dose) daily for the first few days.     Common side effects are nausea, headache, stomach upset or mild diarrhea. If you find it makes you sleepy, plan to take it at bedtime. Most of these side effects gradually improve over the first week of use.     It can take up to 2 weeks to notice initial benefits from the medication (ie increase in energy), and up to 4 weeks for other symptom improvement.     If you experience a worsening in mood or thoughts of self harm, seek help immediately.    Plan to follow up in 3-4.

## 2020-05-12 ASSESSMENT — ANXIETY QUESTIONNAIRES: GAD7 TOTAL SCORE: 14

## 2020-06-08 NOTE — PROGRESS NOTES
"Carrillo Aceves is a 25 year old male who is being evaluated via a billable telephone visit.      The patient has been notified of following:     \"This telephone visit will be conducted via a call between you and your physician/provider. We have found that certain health care needs can be provided without the need for a physical exam.  This service lets us provide the care you need with a short phone conversation.  If a prescription is necessary we can send it directly to your pharmacy.  If lab work is needed we can place an order for that and you can then stop by our lab to have the test done at a later time.    Telephone visits are billed at different rates depending on your insurance coverage. During this emergency period, for some insurers they may be billed the same as an in-person visit.  Please reach out to your insurance provider with any questions.    If during the course of the call the physician/provider feels a telephone visit is not appropriate, you will not be charged for this service.\"    Patient has given verbal consent for Telephone visit?  Yes    What phone number would you like to be contacted at? [phone]    How would you like to obtain your AVS? Anika Monte     Carrillo Aceves is a 25 year old male who presents via phone visit today for the following health issues:    HPI  Depression and Anxiety Follow-Up- last visit 1 mo ago- started sertraline.  No major side effects really.  I don't know if I have had benefit at all with the medication.   Describes mood- \"pretty normal for me\"   Sleep- still waking early at 3-4am. No matter when goes to sleep. Can't fall back asleep.   Worry- he reports worrying about returning to school in the fall. That is the main issue.   Job did not end up changing- still at the [employer].   Living with family. Going ok. Two of his family members tested positive for coronavirus. He tested negative.   He has not been exercising.   No alcohol. No substance use. " "    Prior meds-   Wellbutrin in teen years- not sure he had any benefit.  Prozac- side effects      How are you doing with your depression since your last visit? \"up and down\"    How are you doing with your anxiety since your last visit?  \"up and down\"    Are you having other symptoms that might be associated with depression or anxiety? No    Have you had a significant life event? No     Do you have any concerns with your use of alcohol or other drugs? No    Social History     Tobacco Use     Smoking status: Never Smoker     Smokeless tobacco: Never Used   Substance Use Topics     Alcohol use: No     Comment: none for past 8 months (as of 01/2019)     Drug use: No     PHQ 2/7/2019 5/11/2020 6/9/2020   PHQ-9 Total Score 10 9 10   Q9: Thoughts of better off dead/self-harm past 2 weeks Not at all Not at all Not at all     DORY-7 SCORE 2/7/2019 5/11/2020 6/9/2020   Total Score - - -   Total Score 8 (mild anxiety) - -   Total Score 8 14 12     Last PHQ-9 6/9/2020   1.  Little interest or pleasure in doing things 1   2.  Feeling down, depressed, or hopeless 0   3.  Trouble falling or staying asleep, or sleeping too much 3   4.  Feeling tired or having little energy 2   5.  Poor appetite or overeating 2   6.  Feeling bad about yourself 0   7.  Trouble concentrating 2   8.  Moving slowly or restless 0   Q9: Thoughts of better off dead/self-harm past 2 weeks 0   PHQ-9 Total Score 10   Difficulty at work, home, or with people Very difficult     DORY-7  6/9/2020   1. Feeling nervous, anxious, or on edge 3   2. Not being able to stop or control worrying 1   3. Worrying too much about different things 2   4. Trouble relaxing 3   5. Being so restless that it is hard to sit still 2   6. Becoming easily annoyed or irritable 1   7. Feeling afraid, as if something awful might happen 0   DORY-7 Total Score 12   If you checked any problems, how difficult have they made it for you to do your work, take care of things at home, or get along " with other people? Very difficult       Suicide Assessment Five-step Evaluation and Treatment (SAFE-T)            Patient Active Problem List   Diagnosis     Vitamin D deficiency     Mild major depression (H)     Health Care Home     Acne cystica     DORY (generalized anxiety disorder)     Past Surgical History:   Procedure Laterality Date     NO HISTORY OF SURGERY         Social History     Tobacco Use     Smoking status: Never Smoker     Smokeless tobacco: Never Used   Substance Use Topics     Alcohol use: No     Comment: none for past 8 months (as of 01/2019)     Family History   Problem Relation Age of Onset     Lipids Father      Hypertension Mother      Thyroid Disease Mother      Breast Cancer Maternal Grandmother      Cancer Paternal Grandfather      Depression/Anxiety Sister      Asthma No family hx of      C.A.D. No family hx of      Diabetes No family hx of      Cerebrovascular Disease No family hx of      Cancer - colorectal No family hx of      Prostate Cancer No family hx of      Alcohol/Drug No family hx of      Allergies No family hx of      Alzheimer Disease No family hx of      Anesthesia Reaction No family hx of      Arthritis No family hx of      Blood Disease No family hx of      Cardiovascular No family hx of      Circulatory No family hx of      Congenital Anomalies No family hx of      Connective Tissue Disorder No family hx of          No current outpatient medications on file.     No Known Allergies  BP Readings from Last 3 Encounters:   02/07/19 100/56   01/07/19 110/68   01/06/17 109/70    Wt Readings from Last 3 Encounters:   02/07/19 72.6 kg (160 lb)   01/07/19 72.7 kg (160 lb 4.8 oz)   01/06/17 68.7 kg (151 lb 6.4 oz) (44 %, Z= -0.15)*     * Growth percentiles are based on CDC (Boys, 2-20 Years) data.                    Reviewed and updated as needed this visit by Provider         Review of Systems   Constitutional, HEENT, cardiovascular, pulmonary, gi and gu systems are negative,  except as otherwise noted.       Objective   Reported vitals:  There were no vitals taken for this visit.   healthy, alert and no distress  PSYCH: Alert and oriented times 3; coherent speech, normal   rate and volume, able to articulate logical thoughts, able   to abstract reason, no tangential thoughts, no hallucinations   or delusions  His affect is normal and pleasant  RESP: No cough, no audible wheezing, able to talk in full sentences  Remainder of exam unable to be completed due to telephone visits    Diagnostic Test Results:  Labs reviewed in Epic        Assessment/Plan:  1. DORY (generalized anxiety disorder)  2. Mild major depression (H)  Pt is tolerating the sertraline well without side effects, not sure about benefit for his anxiety.  He would like to try a higher dose before changing medications  Will increase from 50mg to 100mg.  Close follow up- recheck in 2-3 weeks. Would change meds at that time if not having improvement.  He declines counseling referral at this time  Encouraged to continue regular exercise.     - sertraline (ZOLOFT) 100 MG tablet; Take 1 tablet (100 mg) by mouth daily  Dispense: 30 tablet; Refill: 0    Return in about 3 weeks (around 6/30/2020) for Recheck- anxiety/mood in 2-3 weeks.      Phone call duration:  10 minutes    Yamini Jernigan PA-C

## 2020-06-09 ENCOUNTER — TELEPHONE (OUTPATIENT)
Dept: FAMILY MEDICINE | Facility: CLINIC | Age: 25
End: 2020-06-09

## 2020-06-09 ENCOUNTER — VIRTUAL VISIT (OUTPATIENT)
Dept: FAMILY MEDICINE | Facility: OTHER | Age: 25
End: 2020-06-09
Payer: COMMERCIAL

## 2020-06-09 DIAGNOSIS — F41.1 GAD (GENERALIZED ANXIETY DISORDER): Primary | ICD-10-CM

## 2020-06-09 DIAGNOSIS — F32.0 MILD MAJOR DEPRESSION (H): ICD-10-CM

## 2020-06-09 PROCEDURE — 99214 OFFICE O/P EST MOD 30 MIN: CPT | Mod: TEL | Performed by: PHYSICIAN ASSISTANT

## 2020-06-09 RX ORDER — SERTRALINE HYDROCHLORIDE 100 MG/1
100 TABLET, FILM COATED ORAL DAILY
Qty: 30 TABLET | Refills: 0 | Status: SHIPPED | OUTPATIENT
Start: 2020-06-09 | End: 2020-07-01 | Stop reason: ALTCHOICE

## 2020-06-09 ASSESSMENT — ANXIETY QUESTIONNAIRES
5. BEING SO RESTLESS THAT IT IS HARD TO SIT STILL: MORE THAN HALF THE DAYS
IF YOU CHECKED OFF ANY PROBLEMS ON THIS QUESTIONNAIRE, HOW DIFFICULT HAVE THESE PROBLEMS MADE IT FOR YOU TO DO YOUR WORK, TAKE CARE OF THINGS AT HOME, OR GET ALONG WITH OTHER PEOPLE: VERY DIFFICULT
7. FEELING AFRAID AS IF SOMETHING AWFUL MIGHT HAPPEN: NOT AT ALL
1. FEELING NERVOUS, ANXIOUS, OR ON EDGE: NEARLY EVERY DAY
6. BECOMING EASILY ANNOYED OR IRRITABLE: SEVERAL DAYS
3. WORRYING TOO MUCH ABOUT DIFFERENT THINGS: MORE THAN HALF THE DAYS
2. NOT BEING ABLE TO STOP OR CONTROL WORRYING: SEVERAL DAYS
GAD7 TOTAL SCORE: 12

## 2020-06-09 ASSESSMENT — PATIENT HEALTH QUESTIONNAIRE - PHQ9
5. POOR APPETITE OR OVEREATING: NEARLY EVERY DAY
SUM OF ALL RESPONSES TO PHQ QUESTIONS 1-9: 10

## 2020-06-09 NOTE — TELEPHONE ENCOUNTER
Yamini Jernigan wants patient to have a recheck in 2-3 weeks.     LMTCB, when patient calls back please schedule telephone visit in 2-3 weeks with Yamini Jernigan.     Darin Gomez MA on 6/9/2020 at 4:05 PM

## 2020-06-09 NOTE — PATIENT INSTRUCTIONS
Increase sertraline from 50mg to 100mg.    Follow up in 2-3 weeks. If not noticing any improvement in anxiety sx at that time would discuss medication changes.   Contact sooner if side effects    Work on regular exercise

## 2020-06-10 ASSESSMENT — ANXIETY QUESTIONNAIRES: GAD7 TOTAL SCORE: 12

## 2020-06-30 ENCOUNTER — VIRTUAL VISIT (OUTPATIENT)
Dept: FAMILY MEDICINE | Facility: OTHER | Age: 25
End: 2020-06-30
Payer: COMMERCIAL

## 2020-06-30 DIAGNOSIS — F41.1 GAD (GENERALIZED ANXIETY DISORDER): Primary | ICD-10-CM

## 2020-06-30 PROCEDURE — 99213 OFFICE O/P EST LOW 20 MIN: CPT | Mod: TEL | Performed by: PHYSICIAN ASSISTANT

## 2020-06-30 ASSESSMENT — ANXIETY QUESTIONNAIRES
5. BEING SO RESTLESS THAT IT IS HARD TO SIT STILL: SEVERAL DAYS
IF YOU CHECKED OFF ANY PROBLEMS ON THIS QUESTIONNAIRE, HOW DIFFICULT HAVE THESE PROBLEMS MADE IT FOR YOU TO DO YOUR WORK, TAKE CARE OF THINGS AT HOME, OR GET ALONG WITH OTHER PEOPLE: VERY DIFFICULT
GAD7 TOTAL SCORE: 9
7. FEELING AFRAID AS IF SOMETHING AWFUL MIGHT HAPPEN: NOT AT ALL
2. NOT BEING ABLE TO STOP OR CONTROL WORRYING: SEVERAL DAYS
3. WORRYING TOO MUCH ABOUT DIFFERENT THINGS: NOT AT ALL
6. BECOMING EASILY ANNOYED OR IRRITABLE: SEVERAL DAYS
1. FEELING NERVOUS, ANXIOUS, OR ON EDGE: NEARLY EVERY DAY

## 2020-06-30 ASSESSMENT — PATIENT HEALTH QUESTIONNAIRE - PHQ9
SUM OF ALL RESPONSES TO PHQ QUESTIONS 1-9: 10
5. POOR APPETITE OR OVEREATING: NEARLY EVERY DAY

## 2020-06-30 NOTE — PROGRESS NOTES
"Carrillo Aceves is a 25 year old male who is being evaluated via a billable telephone visit.      The patient has been notified of following:     \"This telephone visit will be conducted via a call between you and your physician/provider. We have found that certain health care needs can be provided without the need for a physical exam.  This service lets us provide the care you need with a short phone conversation.  If a prescription is necessary we can send it directly to your pharmacy.  If lab work is needed we can place an order for that and you can then stop by our lab to have the test done at a later time.    Telephone visits are billed at different rates depending on your insurance coverage. During this emergency period, for some insurers they may be billed the same as an in-person visit.  Please reach out to your insurance provider with any questions.    If during the course of the call the physician/provider feels a telephone visit is not appropriate, you will not be charged for this service.\"    Patient has given verbal consent for Telephone visit?  Yes    What phone number would you like to be contacted at? [phone]    How would you like to obtain your AVS? Anika Monte     Carrillo Aceves is a 25 year old male who presents via phone visit today for the following health issues:    HPI  Depression and Anxiety Follow-Up  Last visit increased sertraline from 50mg to 100mg. Doesn't think it helped him much. Maybe took a little edge off anxiety.   Has not helped with the major issues - interrupted sleep. Always wakes middle of night and can't fall back asleep. Low energy. Low motivation. Has not started exercising  Mood- fine. Normal. Denies feeling down.   Getting to work. Not late or missing.   Wants to feel less anxious.  Denies unhealthy coping.     Hx of meds tried:  Wellbutrin   Prozac- side effects or didn't help       How are you doing with your depression since your last visit? No change    How are " you doing with your anxiety since your last visit?  No change    Are you having other symptoms that might be associated with depression or anxiety? Yes:  Trouble sleeping, waking up in the middle of the night, lack of energy, feeling on edge all the time.    Have you had a significant life event? No     Do you have any concerns with your use of alcohol or other drugs? No    Social History     Tobacco Use     Smoking status: Never Smoker     Smokeless tobacco: Never Used   Substance Use Topics     Alcohol use: No     Comment: none for past 8 months (as of 01/2019)     Drug use: No     PHQ 5/11/2020 6/9/2020 6/30/2020   PHQ-9 Total Score 9 10 10   Q9: Thoughts of better off dead/self-harm past 2 weeks Not at all Not at all Not at all     DORY-7 SCORE 5/11/2020 6/9/2020 6/30/2020   Total Score - - -   Total Score - - -   Total Score 14 12 9     Last PHQ-9 6/30/2020   1.  Little interest or pleasure in doing things 2   2.  Feeling down, depressed, or hopeless 0   3.  Trouble falling or staying asleep, or sleeping too much 3   4.  Feeling tired or having little energy 2   5.  Poor appetite or overeating 2   6.  Feeling bad about yourself 0   7.  Trouble concentrating 1   8.  Moving slowly or restless 0   Q9: Thoughts of better off dead/self-harm past 2 weeks 0   PHQ-9 Total Score 10   Difficulty at work, home, or with people Very difficult     DORY-7  6/30/2020   1. Feeling nervous, anxious, or on edge 3   2. Not being able to stop or control worrying 1   3. Worrying too much about different things 0   4. Trouble relaxing 3   5. Being so restless that it is hard to sit still 1   6. Becoming easily annoyed or irritable 1   7. Feeling afraid, as if something awful might happen 0   DORY-7 Total Score 9   If you checked any problems, how difficult have they made it for you to do your work, take care of things at home, or get along with other people? Very difficult     Suicide Assessment Five-step Evaluation and Treatment  (SAFE-T)      How many servings of fruits and vegetables do you eat daily?  0-1    On average, how many sweetened beverages do you drink each day (Examples: soda, juice, sweet tea, etc.  Do NOT count diet or artificially sweetened beverages)?   0    How many days per week do you exercise enough to make your heart beat faster? 3 or less    How many minutes a day do you exercise enough to make your heart beat faster? 9 or less  How many days per week do you miss taking your medication? Once    What makes it hard for you to take your medications?  remembering to take         Patient Active Problem List   Diagnosis     Vitamin D deficiency     Mild major depression (H)     Health Care Home     Acne cystica     DORY (generalized anxiety disorder)     Past Surgical History:   Procedure Laterality Date     NO HISTORY OF SURGERY         Social History     Tobacco Use     Smoking status: Never Smoker     Smokeless tobacco: Never Used   Substance Use Topics     Alcohol use: No     Comment: none for past 8 months (as of 01/2019)     Family History   Problem Relation Age of Onset     Lipids Father      Hypertension Mother      Thyroid Disease Mother      Breast Cancer Maternal Grandmother      Cancer Paternal Grandfather      Depression/Anxiety Sister      Asthma No family hx of      C.A.D. No family hx of      Diabetes No family hx of      Cerebrovascular Disease No family hx of      Cancer - colorectal No family hx of      Prostate Cancer No family hx of      Alcohol/Drug No family hx of      Allergies No family hx of      Alzheimer Disease No family hx of      Anesthesia Reaction No family hx of      Arthritis No family hx of      Blood Disease No family hx of      Cardiovascular No family hx of      Circulatory No family hx of      Congenital Anomalies No family hx of      Connective Tissue Disorder No family hx of          Current Outpatient Medications   Medication Sig Dispense Refill     sertraline (ZOLOFT) 100 MG tablet  Take 1 tablet (100 mg) by mouth daily 30 tablet 0     No Known Allergies  BP Readings from Last 3 Encounters:   02/07/19 100/56   01/07/19 110/68   01/06/17 109/70    Wt Readings from Last 3 Encounters:   02/07/19 72.6 kg (160 lb)   01/07/19 72.7 kg (160 lb 4.8 oz)   01/06/17 68.7 kg (151 lb 6.4 oz) (44 %, Z= -0.15)*     * Growth percentiles are based on [facility][note 3] (Boys, 2-20 Years) data.                    Reviewed and updated as needed this visit by Provider         Review of Systems   Constitutional, HEENT, cardiovascular, pulmonary, gi and gu systems are negative, except as otherwise noted.       Objective   Reported vitals:  There were no vitals taken for this visit.   healthy, alert and no distress  PSYCH: Alert and oriented times 3; coherent speech, normal   rate and volume, able to articulate logical thoughts, able   to abstract reason, no tangential thoughts, no hallucinations   or delusions  His affect is normal and pleasant, no SI/HI  RESP: No cough, no audible wheezing, able to talk in full sentences  Remainder of exam unable to be completed due to telephone visits    Diagnostic Test Results:  none         Assessment/Plan:  1. DORY (generalized anxiety disorder)  Taper down on sertraline from 100mg to 50mg for 1-2 days. Then start venlafaxine.  Start 37.5mg daily for 7 days, if tolerating increase to 75mg.   Follow up in 3-4 weeks  Encouraged lifestyle habits- exercise, sleep hygiene   He again declines counseling.   - venlafaxine (EFFEXOR-XR) 37.5 MG 24 hr capsule; Take 1 capsule (37.5 mg) by mouth daily X 7 days, Then Increase to 2 capsules daily.  Dispense: 53 capsule; Refill: 0    Return in about 4 weeks (around 7/28/2020) for Recheck.      Phone call duration:  9 minutes    Yamini Jernigan PA-C

## 2020-07-01 RX ORDER — VENLAFAXINE HYDROCHLORIDE 37.5 MG/1
37.5 CAPSULE, EXTENDED RELEASE ORAL DAILY
Qty: 53 CAPSULE | Refills: 0 | Status: SHIPPED | OUTPATIENT
Start: 2020-07-01 | End: 2020-09-14 | Stop reason: DRUGHIGH

## 2020-07-01 ASSESSMENT — ANXIETY QUESTIONNAIRES: GAD7 TOTAL SCORE: 9

## 2020-07-02 NOTE — PATIENT INSTRUCTIONS
Stop the sertraline    Start venlafaxine 37.5mg once daily. If tolerating well after 1 week, increase to 2 capsules daily (75mg).     Follow up in 3-4 weeks

## 2020-07-27 NOTE — PROGRESS NOTES
"Carrillo Aceves is a 25 year old male who is being evaluated via a billable telephone visit.      The patient has been notified of following:     \"This telephone visit will be conducted via a call between you and your physician/provider. We have found that certain health care needs can be provided without the need for a physical exam.  This service lets us provide the care you need with a short phone conversation.  If a prescription is necessary we can send it directly to your pharmacy.  If lab work is needed we can place an order for that and you can then stop by our lab to have the test done at a later time.    Telephone visits are billed at different rates depending on your insurance coverage. During this emergency period, for some insurers they may be billed the same as an in-person visit.  Please reach out to your insurance provider with any questions.    If during the course of the call the physician/provider feels a telephone visit is not appropriate, you will not be charged for this service.\"    Patient has given verbal consent for Telephone visit?  Yes    What phone number would you like to be contacted at? [phone]    How would you like to obtain your AVS? Anika Monte     Carrillo Aceves is a 25 year old male who presents via phone visit today for the following health issues:    HPI    Depression and Anxiety Follow-Up- last visit discontinued sertraline. Started venlafaxine.   I've been feeling a lot less anxious overall I think.   I am able to make phone calls and talk to people about things. Used to worry a lot and not be able to do it- that is manageable now.   More motivated and clear minded.   No longer waking up too early before the alarm - had corrina waking at 3am. Now wakes to his alarm for work at 6:30am.   Enjoying things a bit more.  No side effects on the venlafaxine.   Thinks he will sign up to finish AA degree at [employer] this fall.    How are you doing with your depression since " "your last visit? Improved     How are you doing with your anxiety since your last visit?  Improved     Are you having other symptoms that might be associated with depression or anxiety? No    Have you had a significant life event? No     Do you have any concerns with your use of alcohol or other drugs? No     Cysts on back  Not growing but \"they stick out\" . No infectious sx.     Social History     Tobacco Use     Smoking status: Never Smoker     Smokeless tobacco: Never Used   Substance Use Topics     Alcohol use: No     Comment: none for past 8 months (as of 01/2019)     Drug use: No     PHQ 6/9/2020 6/30/2020 7/28/2020   PHQ-9 Total Score 10 10 8   Q9: Thoughts of better off dead/self-harm past 2 weeks Not at all Not at all Not at all     DORY-7 SCORE 6/9/2020 6/30/2020 7/28/2020   Total Score - - -   Total Score - - -   Total Score 12 9 10         Suicide Assessment Five-step Evaluation and Treatment (SAFE-T)      How many servings of fruits and vegetables do you eat daily?  0-1    On average, how many sweetened beverages do you drink each day (Examples: soda, juice, sweet tea, etc.  Do NOT count diet or artificially sweetened beverages)?   0    How many days per week do you exercise enough to make your heart beat faster? 3 or less    How many minutes a day do you exercise enough to make your heart beat faster? 60 or more    How many days per week do you miss taking your medication? 0           Patient Active Problem List   Diagnosis     Vitamin D deficiency     Mild major depression (H)     Health Care Home     Acne cystica     DORY (generalized anxiety disorder)     Past Surgical History:   Procedure Laterality Date     NO HISTORY OF SURGERY         Social History     Tobacco Use     Smoking status: Never Smoker     Smokeless tobacco: Never Used   Substance Use Topics     Alcohol use: No     Comment: none for past 8 months (as of 01/2019)     Family History   Problem Relation Age of Onset     Lipids Father      " Hypertension Mother      Thyroid Disease Mother      Breast Cancer Maternal Grandmother      Cancer Paternal Grandfather      Depression/Anxiety Sister      Asthma No family hx of      C.A.D. No family hx of      Diabetes No family hx of      Cerebrovascular Disease No family hx of      Cancer - colorectal No family hx of      Prostate Cancer No family hx of      Alcohol/Drug No family hx of      Allergies No family hx of      Alzheimer Disease No family hx of      Anesthesia Reaction No family hx of      Arthritis No family hx of      Blood Disease No family hx of      Cardiovascular No family hx of      Circulatory No family hx of      Congenital Anomalies No family hx of      Connective Tissue Disorder No family hx of          Current Outpatient Medications   Medication Sig Dispense Refill     venlafaxine (EFFEXOR-XR) 37.5 MG 24 hr capsule Take 1 capsule (37.5 mg) by mouth daily X 7 days, Then Increase to 2 capsules daily. 53 capsule 0     venlafaxine (EFFEXOR-XR) 75 MG 24 hr capsule Take 1 capsule (75 mg) by mouth daily 90 capsule 0     No Known Allergies  BP Readings from Last 3 Encounters:   02/07/19 100/56   01/07/19 110/68   01/06/17 109/70    Wt Readings from Last 3 Encounters:   02/07/19 72.6 kg (160 lb)   01/07/19 72.7 kg (160 lb 4.8 oz)   01/06/17 68.7 kg (151 lb 6.4 oz) (44 %, Z= -0.15)*     * Growth percentiles are based on CDC (Boys, 2-20 Years) data.                    Reviewed and updated as needed this visit by Provider         Review of Systems   Constitutional, HEENT, cardiovascular, pulmonary, gi and gu systems are negative, except as otherwise noted.       Objective   Reported vitals:  There were no vitals taken for this visit.   healthy, alert and no distress  PSYCH: Alert and oriented times 3; coherent speech, normal   rate and volume, able to articulate logical thoughts, able   to abstract reason, no tangential thoughts, no hallucinations   or delusions  His affect is normal and  pleasant  RESP: No cough, no audible wheezing, able to talk in full sentences  Remainder of exam unable to be completed due to telephone visits    Diagnostic Test Results:  none         Assessment/Plan:    1. DORY (generalized anxiety disorder)  2. Mild major depression (H)  Pt having improvement in sx after starting venlafaxine. No side effects.  He would like to stay on present dose another month or so since still having improvement on current dose.   Recheck in 4-6 weeks. He would like to be seen in person. At that time can look at cysts on back he has concerns about.  - venlafaxine (EFFEXOR-XR) 75 MG 24 hr capsule; Take 1 capsule (75 mg) by mouth daily  Dispense: 90 capsule; Refill: 0    Return in about 6 weeks (around 9/8/2020) for Recheck, With PCP- in person .      Phone call duration:  9 minutes    Yamini Jernigan PA-C

## 2020-07-28 ENCOUNTER — VIRTUAL VISIT (OUTPATIENT)
Dept: FAMILY MEDICINE | Facility: OTHER | Age: 25
End: 2020-07-28
Payer: COMMERCIAL

## 2020-07-28 DIAGNOSIS — F41.1 GAD (GENERALIZED ANXIETY DISORDER): Primary | ICD-10-CM

## 2020-07-28 DIAGNOSIS — F32.0 MILD MAJOR DEPRESSION (H): ICD-10-CM

## 2020-07-28 PROCEDURE — 99214 OFFICE O/P EST MOD 30 MIN: CPT | Mod: TEL | Performed by: PHYSICIAN ASSISTANT

## 2020-07-28 PROCEDURE — 96127 BRIEF EMOTIONAL/BEHAV ASSMT: CPT | Mod: TEL | Performed by: PHYSICIAN ASSISTANT

## 2020-07-28 RX ORDER — VENLAFAXINE HYDROCHLORIDE 75 MG/1
75 CAPSULE, EXTENDED RELEASE ORAL DAILY
Qty: 90 CAPSULE | Refills: 0 | Status: SHIPPED | OUTPATIENT
Start: 2020-07-28 | End: 2020-10-19 | Stop reason: DRUGHIGH

## 2020-07-28 ASSESSMENT — ANXIETY QUESTIONNAIRES
GAD7 TOTAL SCORE: 10
2. NOT BEING ABLE TO STOP OR CONTROL WORRYING: SEVERAL DAYS
IF YOU CHECKED OFF ANY PROBLEMS ON THIS QUESTIONNAIRE, HOW DIFFICULT HAVE THESE PROBLEMS MADE IT FOR YOU TO DO YOUR WORK, TAKE CARE OF THINGS AT HOME, OR GET ALONG WITH OTHER PEOPLE: SOMEWHAT DIFFICULT
1. FEELING NERVOUS, ANXIOUS, OR ON EDGE: MORE THAN HALF THE DAYS
7. FEELING AFRAID AS IF SOMETHING AWFUL MIGHT HAPPEN: NOT AT ALL
6. BECOMING EASILY ANNOYED OR IRRITABLE: SEVERAL DAYS
5. BEING SO RESTLESS THAT IT IS HARD TO SIT STILL: MORE THAN HALF THE DAYS
3. WORRYING TOO MUCH ABOUT DIFFERENT THINGS: SEVERAL DAYS

## 2020-07-28 ASSESSMENT — PAIN SCALES - GENERAL: PAINLEVEL: NO PAIN (0)

## 2020-07-28 ASSESSMENT — PATIENT HEALTH QUESTIONNAIRE - PHQ9
5. POOR APPETITE OR OVEREATING: NEARLY EVERY DAY
SUM OF ALL RESPONSES TO PHQ QUESTIONS 1-9: 8

## 2020-07-28 NOTE — PATIENT INSTRUCTIONS
Continue on venlafaxine 75mg once daily.    See you back in 4-6 weeks- in person - can evaluate the cysts on the back

## 2020-07-29 ASSESSMENT — ANXIETY QUESTIONNAIRES: GAD7 TOTAL SCORE: 10

## 2020-09-11 NOTE — PROGRESS NOTES
"Subjective     Carrillo Aceves is a 25 year old male who presents to clinic today for the following health issues:    History of Present Illness        Mental Health Follow-up:  Patient presents to follow-up on Anxiety.    Patient's anxiety since last visit has been:  Worse  The patient is having other symptoms associated with anxiety.  Any significant life events: other  Patient is feeling anxious or having panic attacks.  Patient has no concerns about alcohol or drug use.     Social History  Tobacco Use    Smoking status: Never Smoker    Smokeless tobacco: Never Used  Alcohol use: No    Comment: none for past 8 months (as of 01/2019)  Drug use: No      Today's PHQ-9         PHQ-9 Total Score:         PHQ-9 Q9 Thoughts of better off dead/self-harm past 2 weeks :       Thoughts of suicide or self harm:      Self-harm Plan:        Self-harm Action:          Safety concerns for self or others:           He eats 2-3 servings of fruits and vegetables daily.He consumes 0 sweetened beverage(s) daily.He exercises with enough effort to increase his heart rate 20 to 29 minutes per day.  He exercises with enough effort to increase his heart rate 5 days per week.   He is taking medications regularly.     Anxiety   Last time we talked started community college back. Has 2 classes now- he needs 3 total classes for his AA degree. He was feeling \"stuck\" just going to work and coming home. He was happy when he signed up  As soon as semester started anxiety through the roof.   Trouble sleeping, procrastination, waking early.   Waking 1-2 hr before but has to be up at 6:20am.  This was an issue prior to venlafaxine that had gotten better before school started.  Online classes. Self directed, which makes it easy to \"put it off\" and get behind. Still turning stuff in.   Before school was feeling a lot better.   He is still working- he is not late. When mood was bad in the past he had trouble getting in to work.   Eating inconsistent. "   Living with family- it is good.   No SI. No cutting.   No side effects on the medication.  No substance use, no alcohol.  Sister.  Counseling in past- not sure would be helpful now.    PHQ 6/9/2020 6/30/2020 7/28/2020   PHQ-9 Total Score 10 10 8   Q9: Thoughts of better off dead/self-harm past 2 weeks Not at all Not at all Not at all     DORY-7 SCORE 6/30/2020 7/28/2020 9/14/2020   Total Score - - -   Total Score - - 13 (moderate anxiety)   Total Score 9 10 13     Would do flu shot     Review of Systems   Constitutional, HEENT, cardiovascular, pulmonary, gi and gu systems are negative, except as otherwise noted.      Objective    BP 96/64   Pulse 90   Temp 99.1  F (37.3  C) (Temporal)   Wt 153 lb (69.4 kg)   SpO2 95%   BMI 22.59 kg/m    Body mass index is 22.59 kg/m .  Physical Exam   GENERAL: healthy, alert and no distress  EYES: Eyes grossly normal to inspection, PERRL and conjunctivae and sclerae normal  HENT: ear canals and TM's normal, nose and mouth without ulcers or lesions  NECK: no adenopathy, no asymmetry, masses, or scars and thyroid normal to palpation  RESP: lungs clear to auscultation - no rales, rhonchi or wheezes  CV: regular rate and rhythm, normal S1 S2, no S3 or S4, no murmur, click or rub, no peripheral edema and peripheral pulses strong  ABDOMEN: soft, nontender, no hepatosplenomegaly, no masses and bowel sounds normal  MS: no gross musculoskeletal defects noted, no edema  SKIN: posterior torso: 2 soft tissue masses non tender, not infected, no overlying erythema. pt has well healed old cutting marks on arms. bilat scars on lower anterior rib cage. no suspicious lesions or rashes  NEURO: Normal strength and tone, mentation intact and speech normal  PSYCH: mentation appears normal, affect normal mildly anxious             Assessment & Plan     DORY (generalized anxiety disorder)  Mild major depression (H)  Pt was doing well on venlafaxine until school started  He is happy about returning to  school but triggering anxiety and mood sx  Increase dose from 75mg to 150mg daily  Follow up in 3-4 weeks, sooner if worsening  He declines counseling referral   - venlafaxine (EFFEXOR-XR) 150 MG 24 hr capsule; Take 1 capsule (150 mg) by mouth daily    Soft tissue mass  Discussed dermatology vs general surgery referral  Sebaceous cysts vs lipoma vs other  Due to size- ref to gen surgery   - GENERAL SURG ADULT REFERRAL; Future    Need for prophylactic vaccination and inoculation against influenza  Given  -  FLU VAC PRESRV FREE QUAD SPLIT VIR > 6 MONTHS IM [[id number]]       Follow Up: The patient was instructed to contact clinic for worsening symptoms, non-improvement as expected/discussed, and for questions regarding medications or treatment plan. Discussed parameters for follow up and included in After Visit Summary given to patient.      Return in about 4 weeks (around 10/12/2020) for Recheck.    Yamini Jernigan PA-C  [facility]    Answers for HPI/ROS submitted by the patient on 9/14/2020   Chronic problems general questions HPI Form  DORY 7 TOTAL SCORE: 13

## 2020-09-14 ENCOUNTER — OFFICE VISIT (OUTPATIENT)
Dept: FAMILY MEDICINE | Facility: CLINIC | Age: 25
End: 2020-09-14
Payer: COMMERCIAL

## 2020-09-14 ENCOUNTER — TELEPHONE (OUTPATIENT)
Dept: FAMILY MEDICINE | Facility: CLINIC | Age: 25
End: 2020-09-14

## 2020-09-14 VITALS
WEIGHT: 153 LBS | HEART RATE: 90 BPM | SYSTOLIC BLOOD PRESSURE: 96 MMHG | BODY MASS INDEX: 22.59 KG/M2 | TEMPERATURE: 99.1 F | DIASTOLIC BLOOD PRESSURE: 64 MMHG | OXYGEN SATURATION: 95 %

## 2020-09-14 DIAGNOSIS — Z23 NEED FOR PROPHYLACTIC VACCINATION AND INOCULATION AGAINST INFLUENZA: ICD-10-CM

## 2020-09-14 DIAGNOSIS — F41.1 GAD (GENERALIZED ANXIETY DISORDER): Primary | ICD-10-CM

## 2020-09-14 DIAGNOSIS — M79.89 SOFT TISSUE MASS: ICD-10-CM

## 2020-09-14 DIAGNOSIS — F32.0 MILD MAJOR DEPRESSION (H): ICD-10-CM

## 2020-09-14 PROCEDURE — 99214 OFFICE O/P EST MOD 30 MIN: CPT | Mod: 25 | Performed by: PHYSICIAN ASSISTANT

## 2020-09-14 PROCEDURE — 90686 IIV4 VACC NO PRSV 0.5 ML IM: CPT | Performed by: PHYSICIAN ASSISTANT

## 2020-09-14 PROCEDURE — 90471 IMMUNIZATION ADMIN: CPT | Performed by: PHYSICIAN ASSISTANT

## 2020-09-14 RX ORDER — VENLAFAXINE HYDROCHLORIDE 150 MG/1
150 CAPSULE, EXTENDED RELEASE ORAL DAILY
Qty: 30 CAPSULE | Refills: 2 | Status: SHIPPED | OUTPATIENT
Start: 2020-09-14 | End: 2021-02-22

## 2020-09-14 ASSESSMENT — ANXIETY QUESTIONNAIRES
5. BEING SO RESTLESS THAT IT IS HARD TO SIT STILL: MORE THAN HALF THE DAYS
3. WORRYING TOO MUCH ABOUT DIFFERENT THINGS: SEVERAL DAYS
7. FEELING AFRAID AS IF SOMETHING AWFUL MIGHT HAPPEN: NOT AT ALL
6. BECOMING EASILY ANNOYED OR IRRITABLE: SEVERAL DAYS
GAD7 TOTAL SCORE: 13
1. FEELING NERVOUS, ANXIOUS, OR ON EDGE: NEARLY EVERY DAY
GAD7 TOTAL SCORE: 13
GAD7 TOTAL SCORE: 13
2. NOT BEING ABLE TO STOP OR CONTROL WORRYING: NEARLY EVERY DAY
4. TROUBLE RELAXING: NEARLY EVERY DAY
7. FEELING AFRAID AS IF SOMETHING AWFUL MIGHT HAPPEN: NOT AT ALL

## 2020-09-14 ASSESSMENT — PAIN SCALES - GENERAL: PAINLEVEL: NO PAIN (0)

## 2020-09-14 NOTE — PATIENT INSTRUCTIONS
To schedule your test or specialty referral please call:  [facility]:  Speciality consultation: [phone] [address] [place]  [facility] [address]     Increase venlafaxine to 150mg  Follow up 4-6 weeks   Consistent sleep patterns

## 2020-09-14 NOTE — TELEPHONE ENCOUNTER
Please help schedule gen surgery at - later afternoon appt preference and call pt with appt information     Kelvin Santana MA

## 2020-09-15 ASSESSMENT — ANXIETY QUESTIONNAIRES: GAD7 TOTAL SCORE: 13

## 2020-09-22 ENCOUNTER — OFFICE VISIT (OUTPATIENT)
Dept: SURGERY | Facility: CLINIC | Age: 25
End: 2020-09-22
Attending: PHYSICIAN ASSISTANT
Payer: COMMERCIAL

## 2020-09-22 VITALS
HEART RATE: 96 BPM | WEIGHT: 153 LBS | DIASTOLIC BLOOD PRESSURE: 63 MMHG | SYSTOLIC BLOOD PRESSURE: 106 MMHG | BODY MASS INDEX: 22.59 KG/M2

## 2020-09-22 DIAGNOSIS — M79.89 SOFT TISSUE MASS: ICD-10-CM

## 2020-09-22 PROCEDURE — 99203 OFFICE O/P NEW LOW 30 MIN: CPT | Performed by: SURGERY

## 2020-09-22 ASSESSMENT — PAIN SCALES - GENERAL: PAINLEVEL: NO PAIN (0)

## 2020-09-22 NOTE — PROGRESS NOTES
I was asked to see Carrillo Aceves regarding multiple back masses by Yamini Jernigan PA-C    CC: Mass    HPI:  Patient is a 25 year old male with complaints of discomfort associated with his back masses. No pain, noticed them a few years ago. They have been growing slowly. No previous infection    Patient does not have a personal history of similar skin problems  Patient does not have a family history of skin cancer    Review Of Systems    General: negative for fever or chills  Skin: negative except mass noted above  Ears/Nose/Throat: negative for, epistaxis, bleeding gums  Respiratory: No shortness of breath, dyspnea on exertion, cough, or hemoptysis  Cardiovascular: negative for and chest pain  Gastrointestinal: negative for, nausea, vomiting and abdominal pain  Genitourinary: negative for and frequency  Neurologic: negative for and local weakness  Hematologic/Lymphatic/Immunologic: negative for, bleeding disorder and frequent infections  Endocrine: negative for, diabetes, polydipsia and polyuria    Past Medical History:   Diagnosis Date     Depression      Muscle weakness (generalized) 2004    Resolved ,due to viral illness     Nonorganic enuresis     Resolved     Unspecified constipation        Past Surgical History:   Procedure Laterality Date     NO HISTORY OF SURGERY         Social History     Socioeconomic History     Marital status: Single     Spouse name: Not on file     Number of children: Not on file     Years of education: Not on file     Highest education level: Not on file   Occupational History     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   Social Needs     Financial resource strain: Not on file     Food insecurity     Worry: Not on file     Inability: Not on file     Transportation needs     Medical: Not on file     Non-medical: Not on file   Tobacco Use     Smoking status: Never Smoker     Smokeless tobacco: Never Used   Substance and Sexual Activity     Alcohol use: No     Comment: none for past 8 months (as  of 01/2019)     Drug use: No     Sexual activity: Never     Partners: Female     Birth control/protection: Condom   Lifestyle     Physical activity     Days per week: Not on file     Minutes per session: Not on file     Stress: Not on file   Relationships     Social connections     Talks on phone: Not on file     Gets together: Not on file     Attends Nondenominational service: Not on file     Active member of club or organization: Not on file     Attends meetings of clubs or organizations: Not on file     Relationship status: Not on file     Intimate partner violence     Fear of current or ex partner: Not on file     Emotionally abused: Not on file     Physically abused: Not on file     Forced sexual activity: Not on file   Other Topics Concern      Service No     Blood Transfusions No     Caffeine Concern No     Occupational Exposure No     Hobby Hazards No     Sleep Concern No     Stress Concern No     Weight Concern No     Special Diet No     Back Care No     Exercise Yes     Bike Helmet No     Comment: Recommneded     Seat Belt Yes     Self-Exams Yes   Social History Narrative     Not on file       Current Outpatient Medications   Medication Sig Dispense Refill     venlafaxine (EFFEXOR-XR) 150 MG 24 hr capsule Take 1 capsule (150 mg) by mouth daily 30 capsule 2     venlafaxine (EFFEXOR-XR) 75 MG 24 hr capsule Take 1 capsule (75 mg) by mouth daily 90 capsule 0       Physical exam:   /63   Pulse 96   Wt 69.4 kg (153 lb)   BMI 22.59 kg/m      Exam:  Constitutional: healthy, alert and no distress  Eyes: Pupils round and equal, no icterus   ENT: Mucous membranes moist  Respiratory:  Non-labored respiration  Gastrointestinal: Abdomen soft, non-tender. BS normal. No masses, organomegaly  Musculoskeletal: No gross deformity  Neurologic: No gross focal deficits  Psychiatric: mentation appears normal and affect normal/bright  Hematologic/Lymphatic/Immunologic: No bruising noted  Skin: No lesions, rashes,  erythema or jaundice noted      Skin: 3 lesions at right back  and is consistent with sebaceous cyst    Assessment: Sebaceous cyst  Plan to excise in the office under local.  Patient feels comfortable with this.      The risks benefits and alternatives of removing the mass (either in the office or the surgery center) were discussed with the patient including but not limited to pain, bleeding, infection, risks of general anesthesia (i.e. MI, CVA, PE, and death), and cosmetic deformity. Additionally we discussed the risk of recurrence.         Mathew Barros, DO

## 2020-09-22 NOTE — LETTER
9/22/2020         RE: Carrillo Aceves  [address]  [place] [address]        Dear Colleague,    Thank you for referring your patient, Carrillo Aceves, to the [facility]. Please see a copy of my visit note below.    I was asked to see Carrillo Aceves regarding multiple back masses by Yamini Jernigan PA-C    CC: Mass    HPI:  Patient is a 25 year old male with complaints of discomfort associated with his back masses. No pain, noticed them a few years ago. They have been growing slowly. No previous infection    Patient does not have a personal history of similar skin problems  Patient does not have a family history of skin cancer    Review Of Systems    General: negative for fever or chills  Skin: negative except mass noted above  Ears/Nose/Throat: negative for, epistaxis, bleeding gums  Respiratory: No shortness of breath, dyspnea on exertion, cough, or hemoptysis  Cardiovascular: negative for and chest pain  Gastrointestinal: negative for, nausea, vomiting and abdominal pain  Genitourinary: negative for and frequency  Neurologic: negative for and local weakness  Hematologic/Lymphatic/Immunologic: negative for, bleeding disorder and frequent infections  Endocrine: negative for, diabetes, polydipsia and polyuria    Past Medical History:   Diagnosis Date     Depression      Muscle weakness (generalized) 2004    Resolved ,due to viral illness     Nonorganic enuresis     Resolved     Unspecified constipation        Past Surgical History:   Procedure Laterality Date     NO HISTORY OF SURGERY         Social History     Socioeconomic History     Marital status: Single     Spouse name: Not on file     Number of children: Not on file     Years of education: Not on file     Highest education level: Not on file   Occupational History     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   Social Needs     Financial resource strain: Not on file     Food insecurity     Worry: Not on file     Inability: Not on file      Transportation needs     Medical: Not on file     Non-medical: Not on file   Tobacco Use     Smoking status: Never Smoker     Smokeless tobacco: Never Used   Substance and Sexual Activity     Alcohol use: No     Comment: none for past 8 months (as of 01/2019)     Drug use: No     Sexual activity: Never     Partners: Female     Birth control/protection: Condom   Lifestyle     Physical activity     Days per week: Not on file     Minutes per session: Not on file     Stress: Not on file   Relationships     Social connections     Talks on phone: Not on file     Gets together: Not on file     Attends Hindu service: Not on file     Active member of club or organization: Not on file     Attends meetings of clubs or organizations: Not on file     Relationship status: Not on file     Intimate partner violence     Fear of current or ex partner: Not on file     Emotionally abused: Not on file     Physically abused: Not on file     Forced sexual activity: Not on file   Other Topics Concern      Service No     Blood Transfusions No     Caffeine Concern No     Occupational Exposure No     Hobby Hazards No     Sleep Concern No     Stress Concern No     Weight Concern No     Special Diet No     Back Care No     Exercise Yes     Bike Helmet No     Comment: Recommneded     Seat Belt Yes     Self-Exams Yes   Social History Narrative     Not on file       Current Outpatient Medications   Medication Sig Dispense Refill     venlafaxine (EFFEXOR-XR) 150 MG 24 hr capsule Take 1 capsule (150 mg) by mouth daily 30 capsule 2     venlafaxine (EFFEXOR-XR) 75 MG 24 hr capsule Take 1 capsule (75 mg) by mouth daily 90 capsule 0       Physical exam:   /63   Pulse 96   Wt 69.4 kg (153 lb)   BMI 22.59 kg/m      Exam:  Constitutional: healthy, alert and no distress  Eyes: Pupils round and equal, no icterus   ENT: Mucous membranes moist  Respiratory:  Non-labored respiration  Gastrointestinal: Abdomen soft, non-tender. BS normal. No  masses, organomegaly  Musculoskeletal: No gross deformity  Neurologic: No gross focal deficits  Psychiatric: mentation appears normal and affect normal/bright  Hematologic/Lymphatic/Immunologic: No bruising noted  Skin: No lesions, rashes, erythema or jaundice noted      Skin: 3 lesions at right back  and is consistent with sebaceous cyst    Assessment: Sebaceous cyst  Plan to excise in the office under local.  Patient feels comfortable with this.      The risks benefits and alternatives of removing the mass (either in the office or the surgery center) were discussed with the patient including but not limited to pain, bleeding, infection, risks of general anesthesia (i.e. MI, CVA, PE, and death), and cosmetic deformity. Additionally we discussed the risk of recurrence.         Mathew Barros, DO      Again, thank you for allowing me to participate in the care of your patient.        Sincerely,        Mathew Barros, DO

## 2020-09-29 ENCOUNTER — OFFICE VISIT (OUTPATIENT)
Dept: SURGERY | Facility: CLINIC | Age: 25
End: 2020-09-29
Payer: COMMERCIAL

## 2020-09-29 VITALS
HEART RATE: 97 BPM | BODY MASS INDEX: 22.59 KG/M2 | WEIGHT: 153 LBS | DIASTOLIC BLOOD PRESSURE: 43 MMHG | SYSTOLIC BLOOD PRESSURE: 82 MMHG

## 2020-09-29 DIAGNOSIS — M79.89 SOFT TISSUE MASS: Primary | ICD-10-CM

## 2020-09-29 PROCEDURE — 88305 TISSUE EXAM BY PATHOLOGIST: CPT | Performed by: SURGERY

## 2020-09-29 PROCEDURE — 11403 EXC TR-EXT B9+MARG 2.1-3CM: CPT | Performed by: SURGERY

## 2020-09-29 PROCEDURE — 11402 EXC TR-EXT B9+MARG 1.1-2 CM: CPT | Mod: 51 | Performed by: SURGERY

## 2020-09-29 NOTE — LETTER
9/29/2020         RE: Carrillo Aceves  [address]  [place] [address]        Dear Colleague,    Thank you for referring your patient, Carrillo Aceves, to the [facility]. Please see a copy of my visit note below.    PROCEDURE:   Written consent was obtained  The right back was prepped and appropriately anesthetized with 1% lidocaine with epinephrine. Using the usual technique, full thickness elliptical excision in total was performed. The total area excised, including lesion and margins was 2.5 cm.  Closure was accomplished with a layered closure.  Derma quinn with an imbedded steri strip was placed.  This was repeated with 2 more cysts the next 1 was 2 cm and the final one was 1.5 cm.  The skin incisions after closure were 2.5 cm, 2 cm and 1.5 cm.  The procedure was well tolerated and without complications. Specimen was sent to Pathology.          Again, thank you for allowing me to participate in the care of your patient.        Sincerely,        Mathew Barros, DO

## 2020-10-02 LAB — COPATH REPORT: NORMAL

## 2020-10-05 NOTE — PROGRESS NOTES
PROCEDURE:   Written consent was obtained  The right back was prepped and appropriately anesthetized with 1% lidocaine with epinephrine. Using the usual technique, full thickness elliptical excision in total was performed. The total area excised, including lesion and margins was 2.5 cm.  Closure was accomplished with a layered closure.  Derma quinn with an imbedded steri strip was placed.  This was repeated with 2 more cysts the next 1 was 2 cm and the final one was 1.5 cm.  The skin incisions after closure were 2.5 cm, 2 cm and 1.5 cm.  The procedure was well tolerated and without complications. Specimen was sent to Pathology.

## 2020-10-08 ENCOUNTER — TELEPHONE (OUTPATIENT)
Dept: FAMILY MEDICINE | Facility: CLINIC | Age: 25
End: 2020-10-08

## 2020-10-08 NOTE — TELEPHONE ENCOUNTER
Reason for call:  Patient reporting a symptom    Symptom or request: Symptom    Duration (how long have symptoms been present): Half an hour last night     Have you been treated for this before? No    Additional comments: Rectal bleeding last night for a half an hour. Today no bleeding. Patient has an appointment for 10/9/20 in ER with Negrita Adams MD. Can you please give patient a call and Triage just to make sure he is ok.     Phone Number patient can be reached at:  Cell number on file:    Telephone Information:   Mobile [phone]       Best Time:  Anytime     Can we leave a detailed message on this number:  YES    Call taken on 10/8/2020 at 5:19 PM by Eliz Acevedo

## 2020-10-09 ENCOUNTER — OFFICE VISIT (OUTPATIENT)
Dept: FAMILY MEDICINE | Facility: OTHER | Age: 25
End: 2020-10-09
Payer: COMMERCIAL

## 2020-10-09 VITALS
SYSTOLIC BLOOD PRESSURE: 136 MMHG | HEIGHT: 69 IN | TEMPERATURE: 98.9 F | RESPIRATION RATE: 16 BRPM | HEART RATE: 100 BPM | WEIGHT: 151 LBS | DIASTOLIC BLOOD PRESSURE: 62 MMHG | BODY MASS INDEX: 22.36 KG/M2

## 2020-10-09 DIAGNOSIS — K64.5 THROMBOSED EXTERNAL HEMORRHOIDS: Primary | ICD-10-CM

## 2020-10-09 PROCEDURE — 99213 OFFICE O/P EST LOW 20 MIN: CPT | Performed by: FAMILY MEDICINE

## 2020-10-09 ASSESSMENT — PAIN SCALES - GENERAL: PAINLEVEL: NO PAIN (0)

## 2020-10-09 ASSESSMENT — MIFFLIN-ST. JEOR: SCORE: 1671.18

## 2020-10-09 NOTE — PROGRESS NOTES
"Lavell Aceves is a 25 year old male who presents to clinic today for the following health issues:    HPI         Hemorrhoids  Onset/Duration: 10/07/2020  Description:   Jessica-anal lump: YES  Pain: YES  Itching: YES  Accompanying Signs & Symptoms:  Blood in stool: no  Changes in stool pattern: YES- Saturday had diarrhea no BM since then  History:   Any previous GI studies done:none  Family History of colon cancer: no  Precipitating factors:   Diarrhea, No BM in 5 days   Alleviating factors:  None  Therapies tried and outcome: none  Monday thre up every 30 min. Has not eaten anything since noon on Sunday. Took Tuesday off work. Did not eat anything from Sunday to Tuesday. Ate few granola bars on Wednesday. Has not had a bowel movement since Saturday. Wednesday night started noticing blood. Dripping blood , fairly heavy bleeding lasted for half an hour. Noticed a lump all day Wednesday with slight stinging       Review of Systems         Objective    /62   Pulse 100   Temp 98.9  F (37.2  C) (Temporal)   Resp 16   Ht 1.754 m (5' 9.06\")   Wt 68.5 kg (151 lb)   BMI 22.26 kg/m    Body mass index is 22.26 kg/m .  Physical Exam  Constitutional:       Appearance: Normal appearance.   HENT:      Head: Normocephalic.   Cardiovascular:      Rate and Rhythm: Regular rhythm.      Pulses: Normal pulses.      Heart sounds: Normal heart sounds. No murmur. No friction rub. No gallop.    Pulmonary:      Effort: Pulmonary effort is normal.      Breath sounds: Normal breath sounds.   Abdominal:      General: Bowel sounds are normal. There is no distension.      Palpations: There is no mass.      Tenderness: There is no abdominal tenderness. There is no right CVA tenderness, left CVA tenderness, guarding or rebound.      Hernia: No hernia is present.   Genitourinary:     Comments: Noticed a thrombosed hemorrhoid at 5'0 clock position  Neurological:      General: No focal deficit present.      Mental Status: He is " alert and oriented to person, place, and time.   Psychiatric:         Mood and Affect: Mood normal.         Behavior: Behavior normal.         Thought Content: Thought content normal.         Judgment: Judgment normal.       1. Thrombosed external hemorrhoids  Discussed etiology and course. Close to >72 hrs since onset and symptoms already improving, there is no indication for intervention  -Discussed home care  Reportable signs and symptoms discussed  RTC if symptoms persist or fail to improve

## 2020-10-09 NOTE — PATIENT INSTRUCTIONS
Patient Education     Thrombosed Hemorrhoids    Hemorrhoids are swollen veins in the lower rectum and anus. They're similar to varicose veins that form in the legs. Hemorrhoids can happen inside the rectum (internal hemorrhoids). Or one may form at the anal opening (external hemorrhoids). They may bleed, but most hemorrhoids aren't cause for concern. But a small blood clot (thrombus) can develop in an external hemorrhoid. This may lead to severe pain and sometimes bleeding.  When to go to the emergency room (ER)  If you have severe pain or excessive bleeding, seek medical care right away.  What to expect in the ER  A healthcare provider is likely to check your anus and rectum using a thin, lighted tube (anoscope or proctoscope). You will get a local pain reliever (anesthetic) to ease any mild pain.  Treatment  Treatment recommendations include:    If the blood clot has formed within the past 48 to 72 hours, your healthcare provider may remove it from within the hemorrhoid. This is a simple procedure that can ease pain. You will have a local anesthetic to keep you pain-free during the procedure. The provider makes a small cut (incision) in the skin and removes the blood clot. Stitches are generally not needed.    If more than 72 hours have passed, your healthcare provider will suggest home treatments. Simple home treatments can ease your pain. These may include warm baths, ointments, suppositories, and witch hazel compresses. Many thrombosed hemorrhoids go away on their own in a few weeks.    If you have bleeding that continues or painful hemorrhoids, talk with your healthcare provider. Possible treatment may include banding, ligation, or removal (hemorrhoidectomy).  Tips for preventing hemorrhoids  Tips include:    Eat foods that are high in fiber and use fiber supplements to help prevent constipation.    Drink plenty of liquids.    Get regular exercise to help prevent constipation and promote good bowel  function.  Date Last Reviewed: 8/1/2018 2000-2019 [employer]. [address] [place] [address], [place] [address]. All rights reserved. This information is not intended as a substitute for professional medical care. Always follow your healthcare professional's instructions.           Patient Education     Treating Hemorrhoids: Self-Care    Follow your healthcare provider s advice about caring for your hemorrhoids at home. Some treatments help relieve symptoms right away. Others involve making changes in your diet and exercise habits. These can help ease constipation and prevent hemorrhoid symptoms from coming back.  Relieving symptoms  Your healthcare provider may prescribe anti-inflammatory medicine to help ease your symptoms. The following tips will also help relieve pain and swelling.    Take sitz baths. Taking a sitz bath means sitting in a few inches of warm bath water. Soaking for 10 minutes twice a day can provide welcome relief from painful hemorrhoids. It can also help the area stay clean.    Develop good bowel habits. Use the bathroom when you need to. Don t ignore the urge to move your bowels. This can lead to constipation, hard stools, and straining. Also, don t read while on the toilet. Sit only as long as needed. Wipe gently with soft, unscented toilet tissue or baby wipes.    Use ice packs. Placing an ice pack on a thrombosed external hemorrhoid can help relieve pain right away. It will also help reduce the blood clot. Use the ice for 15 to 20 minutes at a time. Keep a cloth between the ice and your skin to prevent skin damage.    Use other measures. Laxatives and enemas can help ease constipation. But use them only on your healthcare provider s advice. For symptom relief, try using cotton pads soaked in witch hazel. These are available at most drugstores. Over-the-counter hemorrhoid ointments and petroleum jelly can also provide relief.  Add fiber to your diet  Adding fiber to your diet can  help relieve constipation by making stools softer and easier to pass. To increase your fiber intake, your healthcare provider may recommend a bulking agent, such as psyllium. This is a high-fiber supplement available at most grocery stores and drugstores. Eating more fiber-rich foods will also help. There are two types of fiber:    Insoluble fiber is the main ingredient in bulking agents. It s also found in foods such as wheat bran, whole-grain breads, fresh fruits, and vegetables.    Soluble fiber is found in foods such as oat bran. Although soluble fiber is good for you, it may not ease constipation as much as foods high in insoluble fiber.  Drink more water  Along with a high-fiber diet, drinking more water can help ease constipation. This is because insoluble fiber absorbs water, making stools soft and bulky. Be sure to drink plenty of water throughout the day. Drinking fruit juices, such as prune juice or apple juice, can also help prevent constipation.  Get more exercise  Regular exercise aids digestion and helps prevent constipation. It s also great for your health. So talk with your healthcare provider about starting an exercise program. Low-impact activities, such as swimming or walking, are good places to start. Take it easy at first. And remember to drink plenty of water when you exercise.  High-fiber foods  High-fiber foods offer many benefits. By making your stools softer, they help heal and prevent swollen hemorrhoids. They may also help reduce the risk of colon and rectal cancer. Best of all, they re usually low in calories and taste great. Here are some examples of fiber-rich foods.    Whole grains, such as wheat bran, corn bran, and brown rice.    Vegetables, especially carrots, broccoli, cabbage, and peas.    Fruits, such as apples, bananas, raisins, peaches, and pears.    Nuts and legumes, especially peanuts, lentils, and kidney beans.  Easy ways to add fiber  The tips below offer some simple ways  to add more high-fiber foods to your meals.    Start your day with a high-fiber breakfast. Eat a wheat bran cereal along with a sliced banana. Or, try peanut butter on whole-wheat toast.    Eat carrot sticks for snacks. They re easy to prepare, taste great, and are low in calories.    Use whole-grain breads instead of white bread for sandwiches.    Eat fruits for treats. Try an apple and some raisins instead of a candy bar.   Date Last Reviewed: 7/1/2016 2000-2019 [employer]. [address] [place] [address]. All rights reserved. This information is not intended as a substitute for professional medical care. Always follow your healthcare professional's instructions.

## 2020-10-09 NOTE — TELEPHONE ENCOUNTER
ANAND Shearer is   Patient called back to discuss his symptoms.     On Monday patient was vomiting. He stated that he took off Monday and Tuesday. Than two nights ago he experienced he felt a lump near his rectum. There was a stinging sensation. Then he developed rectal bleeding. The bleeding was dark red in color. The bleeding occurred through Wednesday evening. He could feel the lump on Wednesday. Thursday he felt it but it was smaller. Today he states the lump is barely there.   His last bowel movement was last Saturday. He stated that it was sfot and fell apart. The color was yellow-greenish. Since than he has not had a bowel movement. His normal BM schedule is every two days.  Denies cough, fever, sob, chest pain, and abdominal pain.      PLAN:   Patient is scheduled to see  today at 4pm. Patient will keep appointment.   Informed patient if his bleeding starts or he develops high fevers or abdominal pain he needs to be seen in the ED. Patient agreed with plan.     Alyssa Calderón RN  October 9, 2020

## 2020-10-13 ENCOUNTER — OFFICE VISIT (OUTPATIENT)
Dept: SURGERY | Facility: CLINIC | Age: 25
End: 2020-10-13
Payer: COMMERCIAL

## 2020-10-13 VITALS
DIASTOLIC BLOOD PRESSURE: 65 MMHG | SYSTOLIC BLOOD PRESSURE: 109 MMHG | WEIGHT: 150 LBS | HEART RATE: 92 BPM | BODY MASS INDEX: 22.12 KG/M2

## 2020-10-13 DIAGNOSIS — M79.89 SOFT TISSUE MASS: Primary | ICD-10-CM

## 2020-10-13 PROCEDURE — 99024 POSTOP FOLLOW-UP VISIT: CPT | Performed by: SURGERY

## 2020-10-13 NOTE — LETTER
10/13/2020         RE: Carrillo Aceves  [address]  [place] [address]        Dear Colleague,    Thank you for referring your patient, Carrillo Aceves, to the [facility]. Please see a copy of my visit note below.    General Surgery Post Op    Pt returns for follow up visit s/p cyst excision.    Patient has been doing well, tolerating diet. Bowels moving well. Pain controlled. No issues with wound healing/redness/drainage. No fevers.    Physical exam: Vitals: /65   Pulse 92   Wt 68 kg (150 lb)   BMI 22.12 kg/m    BMI= Body mass index is 22.12 kg/m .    Exam:  Constitutional: healthy, alert and no distress  Respiratory: Non-labored  Incisions are healing well with no erythema or exudate and the sutures were removed.     Path:  epidermal inclusion cyst    Assessment: Pt is doing well s/p excision of back cyst  - We discussed the pathology results and all questions were answered to the best of my ability.     Plan: Pt doing well and can follow up as needed.       Mathew Barros DO          Again, thank you for allowing me to participate in the care of your patient.        Sincerely,        Mathew Barros, DO

## 2020-10-14 NOTE — PROGRESS NOTES
General Surgery Post Op    Pt returns for follow up visit s/p cyst excision.    Patient has been doing well, tolerating diet. Bowels moving well. Pain controlled. No issues with wound healing/redness/drainage. No fevers.    Physical exam: Vitals: /65   Pulse 92   Wt 68 kg (150 lb)   BMI 22.12 kg/m    BMI= Body mass index is 22.12 kg/m .    Exam:  Constitutional: healthy, alert and no distress  Respiratory: Non-labored  Incisions are healing well with no erythema or exudate and the sutures were removed.     Path:  epidermal inclusion cyst    Assessment: Pt is doing well s/p excision of back cyst  - We discussed the pathology results and all questions were answered to the best of my ability.     Plan: Pt doing well and can follow up as needed.       Mathew Barros, DO

## 2020-10-16 NOTE — PROGRESS NOTES
"Subjective     Carrillo Aceves is a 25 year old male who presents to clinic today for the following health issues:    History of Present Illness       Mental Health Follow-up:  Patient presents to follow-up on Anxiety.    Patient's anxiety since last visit has been:  Better  The patient is not having other symptoms associated with anxiety.  Any significant life events: No  Patient is not feeling anxious or having panic attacks.  Patient has no concerns about alcohol or drug use.     Social History  Tobacco Use    Smoking status: Never Smoker    Smokeless tobacco: Never Used  Alcohol use: No    Comment: none for past 8 months (as of 01/2019)  Drug use: No      Today's PHQ-9         PHQ-9 Total Score:     (P) 5   PHQ-9 Q9 Thoughts of better off dead/self-harm past 2 weeks :   (P) Not at all   Thoughts of suicide or self harm:      Self-harm Plan:        Self-harm Action:          Safety concerns for self or others:           He eats 2-3 servings of fruits and vegetables daily.He consumes 0 sweetened beverage(s) daily.He exercises with enough effort to increase his heart rate 20 to 29 minutes per day.  He exercises with enough effort to increase his heart rate 5 days per week.   He is taking medications regularly.     Depression/anxiety   Last visit in Sept 2020 increased venlafaxine from 75mg to 150mg.   Since then- \" I think I am doing better. \"  Getting homework done, staying focused. Going to class. 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   Anxiety level is better.  Still some trouble relaxing  He states he does not feel depressed.  Sleep is better, manageable. Waking up early- does not think dose increase would help. Overall is better. Can fall asleep now more easily.   No side effects with going up on dose.   He thinks when depression was really bad he developed some bad habits w/motivation and coping w/sleeping. He would \"like to work on that\". He is not " "interested in counseling that is relationship based but would be interested in CBT.   His job in [employer] is active. No other exercise  No SI/HI or cutting thoughts or behaviors      PHQ 6/30/2020 7/28/2020 10/19/2020   PHQ-9 Total Score 10 8 5   Q9: Thoughts of better off dead/self-harm past 2 weeks Not at all Not at all Not at all     DORY-7 SCORE 7/28/2020 9/14/2020 10/19/2020   Total Score - - -   Total Score - 13 (moderate anxiety) 5 (mild anxiety)   Total Score 10 13 5     Had cysts/soft tissue masses removed by  gen surgery this month.  Had sutures out last week.   He thinks healing well.     Review of Systems   Constitutional, HEENT, cardiovascular, pulmonary, gi and gu systems are negative, except as otherwise noted.      Objective    /78 (BP Location: Left arm, Patient Position: Sitting, Cuff Size: Adult Regular)   Pulse 98   Temp 97.7  F (36.5  C) (Temporal)   Resp 16   Ht 1.753 m (5' 9.02\")   Wt 69.9 kg (154 lb 1.6 oz)   SpO2 97%   BMI 22.75 kg/m    Body mass index is 22.75 kg/m .  Physical Exam   GENERAL: healthy, alert and no distress  NECK: no adenopathy, no asymmetry, masses, or scars and thyroid normal to palpation  RESP: breathing comfortably, easily, no cough   MS: no gross musculoskeletal defects noted, no edema  SKIN: no suspicious lesions or rashes and posterior torso- nicely healing scars from excision of cysts             Assessment & Plan     DORY (generalized anxiety disorder)  Mild major depression (H)  Refilled venlafaxine at present dose (per below reorder) for 6mo  Pt would like to follow up in 3 months. Sooner if worsening or concerns.   Referral for counseling for CBT   - MENTAL HEALTH REFERRAL  - Adult; Outpatient Treatment; Individual/Couples/Family/Group Therapy/Health Psychology; [facility] [place]: [facility] [phone]; We will contact you to schedule the appointment or please call with any questions  - venlafaxine (EFFEXOR-XR) 150 MG 24 hr capsule; Take " 1 capsule (150 mg) by mouth daily        Follow Up: The patient was instructed to contact clinic for worsening symptoms, non-improvement as expected/discussed, and for questions regarding medications or treatment plan. Discussed parameters for follow up and included in After Visit Summary given to patient.        Return in about 3 months (around 1/19/2021).    Yamini Jernigan PA-C  [facility]    Answers for HPI/ROS submitted by the patient on 10/19/2020   Chronic problems general questions HPI Form  If you checked off any problems, how difficult have these problems made it for you to do your work, take care of things at home, or get along with other people?: Somewhat difficult  PHQ9 TOTAL SCORE: 5  DORY 7 TOTAL SCORE: 5

## 2020-10-19 ENCOUNTER — OFFICE VISIT (OUTPATIENT)
Dept: FAMILY MEDICINE | Facility: CLINIC | Age: 25
End: 2020-10-19
Payer: COMMERCIAL

## 2020-10-19 VITALS
TEMPERATURE: 97.7 F | OXYGEN SATURATION: 97 % | HEART RATE: 98 BPM | DIASTOLIC BLOOD PRESSURE: 78 MMHG | BODY MASS INDEX: 22.82 KG/M2 | HEIGHT: 69 IN | WEIGHT: 154.1 LBS | SYSTOLIC BLOOD PRESSURE: 114 MMHG | RESPIRATION RATE: 16 BRPM

## 2020-10-19 DIAGNOSIS — F32.0 MILD MAJOR DEPRESSION (H): ICD-10-CM

## 2020-10-19 DIAGNOSIS — F41.1 GAD (GENERALIZED ANXIETY DISORDER): Primary | ICD-10-CM

## 2020-10-19 PROCEDURE — 99213 OFFICE O/P EST LOW 20 MIN: CPT | Performed by: PHYSICIAN ASSISTANT

## 2020-10-19 RX ORDER — VENLAFAXINE HYDROCHLORIDE 150 MG/1
150 CAPSULE, EXTENDED RELEASE ORAL DAILY
Qty: 90 CAPSULE | Refills: 1 | Status: SHIPPED | OUTPATIENT
Start: 2020-10-19 | End: 2021-02-22

## 2020-10-19 ASSESSMENT — ANXIETY QUESTIONNAIRES
GAD7 TOTAL SCORE: 5
7. FEELING AFRAID AS IF SOMETHING AWFUL MIGHT HAPPEN: NOT AT ALL
GAD7 TOTAL SCORE: 5
GAD7 TOTAL SCORE: 5
4. TROUBLE RELAXING: MORE THAN HALF THE DAYS
3. WORRYING TOO MUCH ABOUT DIFFERENT THINGS: NOT AT ALL
5. BEING SO RESTLESS THAT IT IS HARD TO SIT STILL: SEVERAL DAYS
6. BECOMING EASILY ANNOYED OR IRRITABLE: NOT AT ALL
2. NOT BEING ABLE TO STOP OR CONTROL WORRYING: NOT AT ALL
1. FEELING NERVOUS, ANXIOUS, OR ON EDGE: MORE THAN HALF THE DAYS
7. FEELING AFRAID AS IF SOMETHING AWFUL MIGHT HAPPEN: NOT AT ALL

## 2020-10-19 ASSESSMENT — PATIENT HEALTH QUESTIONNAIRE - PHQ9
SUM OF ALL RESPONSES TO PHQ QUESTIONS 1-9: 5
SUM OF ALL RESPONSES TO PHQ QUESTIONS 1-9: 5
10. IF YOU CHECKED OFF ANY PROBLEMS, HOW DIFFICULT HAVE THESE PROBLEMS MADE IT FOR YOU TO DO YOUR WORK, TAKE CARE OF THINGS AT HOME, OR GET ALONG WITH OTHER PEOPLE: SOMEWHAT DIFFICULT

## 2020-10-19 ASSESSMENT — MIFFLIN-ST. JEOR: SCORE: 1684.62

## 2020-10-19 NOTE — PATIENT INSTRUCTIONS
Refilled venlafaxine 150mg daily for 3 months  Consider the psychology referral for CBT     Continue to work on self cares

## 2020-10-20 ASSESSMENT — ANXIETY QUESTIONNAIRES: GAD7 TOTAL SCORE: 5

## 2020-10-20 ASSESSMENT — PATIENT HEALTH QUESTIONNAIRE - PHQ9: SUM OF ALL RESPONSES TO PHQ QUESTIONS 1-9: 5

## 2020-12-03 ENCOUNTER — VIRTUAL VISIT (OUTPATIENT)
Dept: PSYCHOLOGY | Facility: CLINIC | Age: 25
End: 2020-12-03
Attending: PHYSICIAN ASSISTANT
Payer: COMMERCIAL

## 2020-12-03 ENCOUNTER — FCC EXTENDED DOCUMENTATION (OUTPATIENT)
Dept: PSYCHOLOGY | Facility: CLINIC | Age: 25
End: 2020-12-03

## 2020-12-03 DIAGNOSIS — F33.41 MAJOR DEPRESSIVE DISORDER, RECURRENT EPISODE, IN PARTIAL REMISSION (H): Primary | ICD-10-CM

## 2020-12-03 ASSESSMENT — COLUMBIA-SUICIDE SEVERITY RATING SCALE - C-SSRS
1. HAVE YOU WISHED YOU WERE DEAD OR WISHED YOU COULD GO TO SLEEP AND NOT WAKE UP?: YES
6. HAVE YOU EVER DONE ANYTHING, STARTED TO DO ANYTHING, OR PREPARED TO DO ANYTHING TO END YOUR LIFE?: NO
ATTEMPT LIFETIME: NO
TOTAL  NUMBER OF ABORTED OR SELF INTERRUPTED ATTEMPTS LIFETIME: NO
REASONS FOR IDEATION LIFETIME: COMPLETELY TO END OR STOP THE PAIN (YOU COULDN'T GO ON LIVING WITH THE PAIN OR HOW YOU WERE FEELING)
2. HAVE YOU ACTUALLY HAD ANY THOUGHTS OF KILLING YOURSELF?: NO
TOTAL  NUMBER OF INTERRUPTED ATTEMPTS LIFETIME: NO
1. IN THE PAST MONTH, HAVE YOU WISHED YOU WERE DEAD OR WISHED YOU COULD GO TO SLEEP AND NOT WAKE UP?: NO

## 2020-12-03 NOTE — PROGRESS NOTES
"[facility]   Provider Name:  Farhat Russell PsyD     Credentials:  JOHANA    PATIENT'S NAME: Carrillo Aceves  PREFERRED NAME: \"Carrillo\"  PRONOUNS:  Him/His     MRN: [id number]  : 1995   ACCT. NUMBER:  [id number]  DATE OF SERVICE: 20  START TIME:   END TIME:   PREFERRED PHONE: [phone]  May we leave a program related message: Yes  SERVICE MODALITY:  Video Visit:      Provider verified identity through the following two step process.  Patient provided:  Patient     Telemedicine Visit: The patient's condition can be safely assessed and treated via synchronous audio and visual telemedicine encounter.      Reason for Telemedicine Visit: Services only offered telehealth    Originating Site (Patient Location): Patient's home    Distant Site (Provider Location): Provider Remote Setting    Consent:  The patient/guardian has verbally consented to: the potential risks and benefits of telemedicine (video visit) versus in person care; bill my insurance or make self-payment for services provided; and responsibility for payment of non-covered services.     Patient would like the video invitation sent by: Send to e-mail at: [email or web address]    Mode of Communication:  Video Conference via [facility]    As the provider I attest to compliance with applicable laws and regulations related to telemedicine.    UNIVERSAL ADULT Mental Health DIAGNOSTIC ASSESSMENT    This session was the initial session of a general Diagnostic Assessment.  All five screeners were administered and a partial structured interview. This will be billed as a Psychiatric Diagnostic Evaluation on completion next session.  This session is not billed.      Identifying Information:  Patient is a 25 year old, .  The pronoun use throughout this assessment reflects the patient's chosen pronoun.  Patient was referred for an assessment by self.  Patient attended the session alone.     Chief Complaint:   The reason for seeking services at " "this time is: \" I'm trying to finish my BA and associates degree and I'm having trouble with writing, and anxiety, concentration, and socializing.  I think anxiety tied to all of them.  I get a general anxious feeling dealing with people and I get kind of worn out by being on edge all day long. \"   The problem(s) began \"sometime in middle school, probably.  That's when I first started anxious and on edge and started getting anxious about social situations. It's not about being compared to others but just being in the social situation.\" Patient has attempted to resolve these concerns in the past through medications and self-help (introspection).      Provider Name/ Credentials:  Farhat Russell PsyD, JOHANA  December 3, 2020                "

## 2020-12-03 NOTE — PROGRESS NOTES
"                 Progress Note - Initial Visit    Client Name:  Carrillo Aceves Date: 12/3/20         Service Type: Individual     Visit Start Time: 11:04am Visit End Time: 11:54am    Visit #: 1 50 minutes    Attendees: Client attended alone    Service Modality:  Video Visit:      Provider verified identity through the following two step process.  Patient provided:  Patient     Telemedicine Visit: The patient's condition can be safely assessed and treated via synchronous audio and visual telemedicine encounter.      Reason for Telemedicine Visit: Services only offered telehealth    Originating Site (Patient Location): Patient's home    Distant Site (Provider Location): Provider Remote Setting    Consent:  The patient/guardian has verbally consented to: the potential risks and benefits of telemedicine (video visit) versus in person care; bill my insurance or make self-payment for services provided; and responsibility for payment of non-covered services.     Patient would like the video invitation sent by: Send to e-mail at: [email or web address]    Mode of Communication:  Video Conference via Amwell    As the provider I attest to compliance with applicable laws and regulations related to telemedicine.       DATA:   Interactive Complexity: No   Crisis: No     Presenting Concerns/  Current Stressors:   \" I'm trying to finish my BA and associates degree and I'm having trouble with writing, and anxiety, concentration, and socializing.  I think anxiety tied to all of them.  I get a general anxious feeling dealing with people and I get kind of worn out by being on edge all day long. \"   The problem(s) began \"sometime in middle school, probably.  That's when I first started anxious and on edge and started getting anxious about social situations. It's not about being compared to others but just being in the social situation.\"      ASSESSMENT:  Mental Status Assessment:  Appearance:   Appropriate   Eye Contact:   Good "   Psychomotor Behavior: Normal   Attitude:   Cooperative  Pleasant  Orientation:   All  Speech   Rate / Production: Normal/ Responsive   Volume:  Normal   Mood:    Euthymic  Affect:    Appropriate   Thought Content:  Clear   Thought Form:  Coherent   Insight:    Fair       Safety Issues and Plan for Safety and Risk Management:   [place] Suicide Severity Rating Scale (Lifetime/Recent)No flowsheet data found.  Patient denies current fears or concerns for personal safety.  Patient denies current or recent suicidal ideation or behaviors.  Patient denies current or recent homicidal ideation or behaviors.  Patient denies current or recent self injurious behavior or ideation.  Patient denies other safety concerns.  Recommended that patient call 911 or go to the local ED should there be a change in any of these risk factors.  Patient reports there are firearms in the house. The firearms are not secured in a locked space. Client was advised to secure all firearms.     Diagnostic Criteria:   - Diminished interest or pleasure in all, or almost all, activities.    - Significant weight loss when not dieting decrease in appetite.    - Psychomotor activity retardation.    - Fatigue or loss of energy.    - Diminished ability to think or concentrate, or indecisiveness.    - Recurrent thoughts of death (not just fear of dying), recurrent suicidal ideation without a specific plan, or a suicide attempt or a specific plan for committing suicide.   B) The symptoms cause clinically significant distress or impairment in social, occupational, or other important areas of functioning  C) The episode is not attributable to the physiological effects of a substance or to another medical condition  D) The occurence of major depressive episode is not better explained by other thought / psychotic disorders  E) There has never been a manic episode or hypomanic episode      DSM5 Diagnoses: (Sustained by DSM5 Criteria Listed Above)  Diagnoses: 296.35  (F33.41)  Major Depressive Disorder, Recurrent Episode, In partial remission _;  Psychosocial & Contextual Factors: School  WHODAS 2.0 (12 item):   WHODAS 2.0 Total Score 12/3/2020   Total Score MyChart 32   Some encounter information is confidential and restricted. Go to Review Flowsheets activity to see all data.     Intervention:   CBT- Patient was educated on the CBT model and asked to bring in examples at next session and asked to think of goals and how he knows when he is done with therapy  Collateral Reports Completed:  Not Applicable      PLAN: (Homework, other):  1. Provider will continue Diagnostic Assessment.  Patient was given the following to do until next session:  To think of goals and how he knows when he is done with therapy.    2. Provider recommended the following referrals: None.      3.  Safety plan created.  Provider recommended that patient  Call 911 or reach out for help if needed.       Farhat Russell  December 3, 2020

## 2020-12-07 ENCOUNTER — VIRTUAL VISIT (OUTPATIENT)
Dept: PSYCHOLOGY | Facility: CLINIC | Age: 25
End: 2020-12-07
Payer: COMMERCIAL

## 2020-12-07 DIAGNOSIS — F33.41 MAJOR DEPRESSIVE DISORDER, RECURRENT EPISODE, IN PARTIAL REMISSION (H): Primary | ICD-10-CM

## 2020-12-07 PROCEDURE — 90791 PSYCH DIAGNOSTIC EVALUATION: CPT | Mod: 95 | Performed by: PSYCHOLOGIST

## 2020-12-07 NOTE — PROGRESS NOTES
"      Provider Name:  Farhat Russell PsyD     Credentials:  LP    Patient Name:  Carrillo Aceves  Date: 20  PREFERRED NAME: \"Carrillo\"  PRONOUNS:  Him/His     MRN: [id number]  : 1995   ACCT. NUMBER:  [id number]  PREFERRED PHONE: [phone]  May we leave a program related message: Yes         Service Type: Individual      Session Start Time: 4:00pm Session End Time: 4:48pm     Session Length: 48 minutes    Session #: 2    Attendees: Client attended alone    Service Modality:  Phone Visit:      Provider verified identity through the following two step process.  Patient provided:  Patient     The patient has been notified of the following:      \"We have found that certain health care needs can be provided without the need for a face to face visit.  This service lets us provide the care you need with a phone conversation.       I will have full access to your [facility] medical record during this entire phone call.   I will be taking notes for your medical record.      Since this is like an office visit, we will bill your insurance company for this service.       There are potential benefits and risks of telephone visits (e.g. limits to patient confidentiality) that differ from in-person visits.?  Confidentiality still applies for telephone services, and nobody will record the visit.  It is important to be in a quiet, private space that is free of distractions (including cell phone or other devices) during the visit.??      If during the course of the call I believe a telephone visit is not appropriate, you will not be charged for this service\"     Consent has been obtained for this service by care team member: Yes     DATA  Interactive Complexity: No  Crisis: No      UNIVERSAL ADULT Mental Health DIAGNOSTIC ASSESSMENT      Identifying Information:  Patient is a 25 year old, .  The pronoun use throughout this assessment reflects the patient's chosen pronoun.  Patient was referred for an assessment by " "self.  Patient attended the session alone.     Chief Complaint:   The reason for seeking services at this time is: \" I'm trying to finish my BA and associates degree (two current classes he's taking now [I'm failing one and the other is ok.  I have a computer science course next semester) and I'm having trouble with writing, and anxiety, concentration, and socializing.  I think anxiety tied to all of them.  I get a general anxious feeling dealing with people and I get kind of worn out by being on edge all day long. \"   The problem(s) began \"sometime in middle school, probably.  That's when I first started anxious and on edge and started getting anxious about social situations. It's not about being compared to others but just being in the social situation.\" Patient has attempted to resolve these concerns in the past through medications and self-help (introspection)..    Social/Family History:  Patient reported they grew up in [place], [place].  They were raised by biological parents.  Parents  9 years ago when the client was 16 years old. The client's mother did remarry 2 years ago The client's father did remarry 3-4 years ago.   Patient reported that their childhood was \"pretty peaceful, I guess.  Seems happy\".  Patient described their current relationships with family of origin as \"pretty good\".      The patient describes their cultural background as \"There was always an emphasis on school, they always expected me to do well. My dad was a marine for 10 years.  He isn't the most emotionally available person. \" Cultural influences and impact on patient's life structure, values, norms, and healthcare: None reported.  Contextual influences on patient's health include: none reported.    These factors will be addressed in the Preliminary Treatment plan.  Patient identified their preferred language to be English. Patient reported they does not need the assistance of an  or other support involved in therapy. " "    Patient reported had no significant delays in developmental tasks.   Patient's highest education level was some college. Patient identified the following learning problems: none reported.  Modifications will not be used to assist communication in therapy.  Patient reports they are  able to understand written materials.    Patient reported the following relationship history : one.  Patient's current relationship status is single for 7 years.   Patient identified their sexual orientation as heterosexual.  Patient reported having zero child(carlos). Patient identified parents and siblings as part of their support system.  Patient identified the quality of these relationships as good.      Patient's current living/housing situation involves staying with his grandparents (their place) and mom.  They live with his grandparents and mom and they report that housing is stable.     Patient is currently employed full time and reports they are able to function appropriately at work..  Patient reports their finances are obtained through employment.  Patient does not identify finances as a current stressor.      Patient reported that they have not been involved with the legal system.  Patient denies being on probation / parole / under the jurisdiction of the court.    Patient's Strengths and Limitations:  Patient identified the following strengths or resources that will help them succeed in treatment: family support and motivation. Things that may interfere with the patient's success in treatment include: none identified.     Personal and Family Medical History:   Patient does report a family history of mental health concerns (paternal grandmother has \"magical thinking and claims to see visions.\"  My sister is diagnosed with Schizotypal PD; my dad has depression and his whole side of the family has mental health issues.  My maternal grandmother has generalized disorder and my mom suspects one of my uncles has aspbergers\".  " "Patient reports family history includes Breast Cancer in his maternal grandmother; Cancer in his paternal grandfather; Depression/Anxiety in his sister; Hypertension in his mother; Lipids in his father; Thyroid Disease in his mother..     Patient does report Mental Health Diagnosis and/or Treatment.  Patient Patient reported the following previous diagnoses which include(s): an Anxiety Disorder and Depression.  Patient reported symptoms began \"sometime in middle school [transferring schools seems like a trigger of sorts]\".   Patient has received mental health services in the past: inpatient mental health [facility] at [facility].  Psychiatric Hospitalizations: \"I don't remember\".  Patient denies a history of civil commitment.  Currently, patient is not receiving other mental health services.  These include primary care provider at [facility].  For follow-up on N/A.           Patient has not had a physical exam to rule out medical causes for current symptoms.  Date of last physical exam was within the past year. Client was encouraged to follow up with PCP if symptoms were to develop. The patient has a [facility] Primary Care Provider, who is named Yamini Jernigan..  Patient reports no current medical concerns.  There are not significant appetite / nutritional concerns / weight changes.   Patient does not report a history of head injury / trauma / cognitive impairment.     Patient reports current meds as:   No outpatient medications have been marked as taking for the 12/7/20 encounter (Appointment) with Farhat Russell.       Medication Adherence:  Patient reports taking prescribed medications as prescribed.    Patient Allergies:  No Known Allergies    Medical History:    Past Medical History:   Diagnosis Date     Depression      Muscle weakness (generalized) 2004    Resolved ,due to viral illness     Nonorganic enuresis     Resolved     Unspecified constipation          Current Mental Status Exam: "   Appearance:  This session was held solely via telephone call; therefore, his appearance could not be observed.    Eye Contact:  This session was held solely via telephone call; therefore, his eye contact could not be observed.   Psychomotor:  This session was held solely via telephone call; therefore, his psychomotor behavior could not be observed.       Gait / station:  This session was held solely via telephone call; therefore, his gait could not be observed.  Attitude / Demeanor: Cooperative   Speech      Rate / Production: Normal/ Responsive      Volume:  Normal  volume      Language:  no problems  Mood:   Euthymic  Affect:   This session was held solely via telephone call; therefore, his affect could not be observed.    Thought Content: Clear   Thought Process: Coherent       Associations: No loosening of associations  Insight:   Fair   Judgment:  Intact   Orientation:  All  Attention/concentration: Fair    Rating Scales:    PHQ9:    PHQ-9 SCORE 7/28/2020 10/19/2020 12/3/2020   PHQ-9 Total Score - - -   PHQ-9 Total Score MyChart - 5 (Mild depression) 14 (Moderate depression)   PHQ-9 Total Score 8 5 -   Some encounter information is confidential and restricted. Go to Review FlowsFundedByMe activity to see all data.   ;    GAD7:    DORY-7 SCORE 9/14/2020 10/19/2020 12/3/2020   Total Score - - -   Total Score 13 (moderate anxiety) 5 (mild anxiety) 10 (moderate anxiety)   Total Score 13 5 -   Some encounter information is confidential and restricted. Go to Review Flowsheets activity to see all data.     CGI:     First:No data recorded;    Most recentNo data recorded    Substance Use:  Patient did report a family history of substance use concerns; see medical history section for details (a couple uncles are alcoholics).  Patient has not received chemical dependency treatment in the past.  Patient has not ever been to detox.      Patient is not currently receiving any chemical dependency treatment. Patient reported the  "following problems as a result of their substance use: none reported.    Patient denies using alcohol.  Patient denies using tobacco.  Patient denies using marijuana.  Patient denies using caffeine.  Patient reports using/abusing the following substance(s). Patient reported no other substance use.     CAGE- AID:    CAGE-AID Total Score 12/3/2020   Total Score 0       Substance Use: No symptoms    Based on the negative CAGE score and clinical interview there  are not indications of drug or alcohol abuse.    Significant Losses / Trauma / Abuse / Neglect Issues:   Patient did not serve in the .  There are indications or report of significant loss, trauma, abuse or neglect issues related to: \"when I was [age] my dad had to go to [place] for a year\".  Concerns for possible neglect are not present.    Safety Assessment:   Current Safety Concerns:  [place] Suicide Severity Rating Scale (Lifetime/Recent)No flowsheet data found.  Patient denies current homicidal ideation and behaviors.  Patient denies current self-injurious ideation and behaviors.    Patient denied risk behaviors associated with substance use.  Patient denies any high risk behaviors associated with mental health symptoms.  Patient reports the following current concerns for their personal safety: None.  Patient reports there are  firearms in the house. The firearms are secured in a locked space.     History of Safety Concerns:  Patient denied a history of homicidal ideation.     Patient denied a history of personal safety concerns.    Patient denied a history of assaultive behaviors.    Patient denied a history of sexual assault behaviors.     Patient denied a history of risk behaviors associated with substance use.  Patient denies any history of high risk behaviors associated with mental health symptoms.  Patient reports the following protective factors: positive relationships positive family connections    Risk Plan:  See Recommendations for Safety and " Risk Management Plan    Review of Symptoms per patient report:  Depression: Change in energy level, Psychomotor slowing or agitation, Irritability and Feeling sad, down, or depressed  Wendi:  No Symptoms  Psychosis: No Symptoms  Anxiety: Nervousness, Poor concentration and Irritability  Panic:  No symptoms  Post Traumatic Stress Disorder:  No Symptoms   Eating Disorder: No Symptoms  ADD / ADHD:  No symptoms  Conduct Disorder: No symptoms  Autism Spectrum Disorder: No symptoms  Obsessive Compulsive Disorder: No Symptoms    Patient reports the following compulsive behaviors and treatment history: none reported.      Diagnostic Criteria:    - Depressed mood. Note: In children and adolescents, can be irritable mood.     - Diminished interest or pleasure in all, or almost all, activities.    - Significant weight loss when not dieting decrease in appetite.    - Psychomotor activity retardation.    - Fatigue or loss of energy.    - Diminished ability to think or concentrate, or indecisiveness.    - Recurrent thoughts of death (not just fear of dying), recurrent suicidal ideation without a specific plan, or a suicide attempt or a specific plan for committing suicide.   B) The symptoms cause clinically significant distress or impairment in social, occupational, or other important areas of functioning  C) The episode is not attributable to the physiological effects of a substance or to another medical condition  D) The occurence of major depressive episode is not better explained by other thought / psychotic disorders    Functional Status:  Patient reports the following functional impairments: academic performance, management of the household and or completion of tasks, relationship(s) and work / vocational responsibilities.     WHODAS:   WHODAS 2.0 Total Score 12/3/2020   Total Score MyChart 32   Some encounter information is confidential and restricted. Go to Review Flowsheets activity to see all data.       Clinical  "Summary:  1. Reason for assessment: \" I'm trying to finish my BA and associates degree (two current classes he's taking now [I'm failing one and the other is ok.  I have a computer science course next semester) and I'm having trouble with writing, and anxiety, concentration, and socializing.  I think anxiety tied to all of them.  I get a general anxious feeling dealing with people and I get kind of worn out by being on edge all day long. \"  2. Psychosocial, Cultural and Contextual Factors: Client is struggling with grades in school.  3. Principal DSM5 Diagnoses  (Sustained by DSM5 Criteria Listed Above):   296.35 (F33.41)  Major Depressive Disorder, Recurrent Episode, In partial remission _.  4. Other Diagnoses that is relevant to services:   none  5. Provisional Diagnosis:  none  6. Prognosis: Expect Improvement.  7. Likely consequences of symptoms if not treated: If left untreated, client will likely fail out of getting his associates degree and not get into the college he was accepted into to earn his BA.  8. Client strengths include:  caring, good listener, motivated, open to learning and open to suggestions / feedback .     Recommendations:     1. Plan for Safety and Risk Management:   Recommended that patient call 911 or go to the local ED should there be a change in any of these risk factors..          Report to child / adult protection services was NA.     2. Patient's identified none.     3. Initial Treatment will focus on:    Depressed Mood - client has a history of depression  Anxiety - client stated his anxiety is leading to failing one of his classes..     4. Resources/Service Plan:    services are not indicated.   Modifications to assist communication are not indicated.   Additional disability accommodations are not indicated.      5. Collaboration:   Collaboration / coordination of treatment will be initiated with the following  support professionals: none.      6.  Referrals:   The " following referral(s) will be initiated: none. Next Scheduled Appointment: December 16, 2020.     A Release of Information has been obtained for the following: none.    7. ANGELICA:    ANGELICA:  Discussed the general effects of drugs and alcohol on health and well-being. Provider gave patient printed information about the effects of chemical use on their health and well being. Recommendations:  No issues with ANGELICA at this time.     8. Records:   These were reviewed at time of assessment.   Information in this assessment was obtained from the medical record and  provided by patient who is a good historian.    Patient will have open access to their mental health medical record.      Provider Name/ Credentials:  Farhat Russell PsyD, JOHANA  December 7, 2020

## 2020-12-16 ENCOUNTER — VIRTUAL VISIT (OUTPATIENT)
Dept: PSYCHOLOGY | Facility: CLINIC | Age: 25
End: 2020-12-16
Payer: COMMERCIAL

## 2020-12-16 DIAGNOSIS — F33.41 MAJOR DEPRESSIVE DISORDER, RECURRENT EPISODE, IN PARTIAL REMISSION (H): Primary | ICD-10-CM

## 2020-12-16 PROCEDURE — 90834 PSYTX W PT 45 MINUTES: CPT | Mod: 95 | Performed by: PSYCHOLOGIST

## 2020-12-17 NOTE — PROGRESS NOTES
Progress Note    Patient Name: Carrillo Aceves  Date: 12/16/20         Service Type: Individual      Session Start Time: 6:09pm Session End Time: 6:57     Session Length: 48 minutes    Session #: 3    Attendees: Client attended alone    Service Modality:  Video Visit:      Provider verified identity through the following two step process.  Patient provided:  Patient photo    Telemedicine Visit: The patient's condition can be safely assessed and treated via synchronous audio and visual telemedicine encounter.      Reason for Telemedicine Visit: Services only offered telehealth    Originating Site (Patient Location): Patient's home    Distant Site (Provider Location): Provider Remote Setting    Consent:  The patient/guardian has verbally consented to: the potential risks and benefits of telemedicine (video visit) versus in person care; bill my insurance or make self-payment for services provided; and responsibility for payment of non-covered services.     Patient would like the video invitation sent by: Send to e-mail at: [email or web address]    Mode of Communication:  Video Conference via [employer]    As the provider I attest to compliance with applicable laws and regulations related to telemedicine.     Treatment Plan Last Reviewed: Started collaborating on this session  PHQ-9 / DORY-7 : 14 / 10 (on 12/3/20)    DATA  Interactive Complexity: No  Crisis: No       Progress Since Last Session (Related to Symptoms / Goals / Homework):   Symptoms: No change High anxiety, reporting low depression    Homework: N/A      Episode of Care Goals: N/A     Current / Ongoing Stressors and Concerns:   Finishing school     Treatment Objective(s) Addressed in This Session:   Collaborate on creation of a Treatment Plan       Intervention:   CBT: Psychoeducation; pattern recognition; problem solved; explored triggers  Validation and other rapport building skills; open-ended and clarifying questions;  "explored beginnings of anxiety        ASSESSMENT: Current Emotional / Mental Status (status of significant symptoms):   Risk status (Self / Other harm or suicidal ideation)   Patient denies current fears or concerns for personal safety.   Patient denies current or recent suicidal ideation or behaviors.   Patient denies current or recent homicidal ideation or behaviors.   Patient denies current or recent self injurious behavior or ideation.   Patient denies other safety concerns.   Patient reports there has been no change in risk factors since their last session.     Patient reports there has been no change in protective factors since their last session.     Recommended that patient call 911 or go to the local ED should there be a change in any of these risk factors.     Appearance:   Appropriate    Eye Contact:   Good    Psychomotor Behavior: Normal    Attitude:   Cooperative    Orientation:   All   Speech    Rate / Production: Normal/ Responsive Normal     Volume:  Normal    Mood:    Euthymic   Affect:    Appropriate    Thought Content:  Clear    Thought Form:  Coherent  Logical    Insight:    Good  and Fair      Medication Review:   Changes to psychiatric medications, see updated Medication List in EPIC.      Medication Compliance:   Yes     Changes in Health Issues:   None reported     Chemical Use Review:   Substance Use: Chemical use reviewed, no active concerns identified      Tobacco Use: No current tobacco use.      Diagnosis:  1. Major depressive disorder, recurrent episode, in partial remission (H)        Collateral Reports Completed:   Not Applicable    PLAN: (Patient Tasks / Therapist Tasks / Other)  Client was emailed his current goals created today in order to complete \"When will you know when this goals has been completed?\"  He will also answer, \"What was the difference between the class you failed and the one you got an 'A' in?  How was the anxiety different?\"  His next scheduled appointment is " "December 23, 2020.        Farhat Russell                                                         ______________________________________________________________________    Treatment Plan    Patient's Name: Carrillo Aceves  YOB: 1995    Date: 12/16/20    DSM5 Diagnoses: 296.35 (F33.41)  Major Depressive Disorder, Recurrent Episode, In partial remission _ and With anxious distress  Psychosocial / Contextual Factors: School  WHODAS: 32 (on 12/3/20)    Referral / Collaboration:  Referral to another professional/service is not indicated at this time..    Anticipated number of session or this episode of care: 12      MeasurableTreatment Goal(s) related to diagnosis / functional impairment(s)  Goal 1: Patient will have control over his day-to-day schedule.  I want to establish a routine everyday.  I want to meditate every day and workout four times a week.  Falling asleep can be an issue too.  I want to work on procrastination I have.\"    I will know I've met my goal when I not only have a consistent daily routine but also being able to follow through on plans that I make when there isn't a good reason for me not to follow through.      Objective #A Client will workout four times a week    Patient will exercise for 30 minutes 4 times per week for 45 minutes.  Status: New - Date: 12/23/20     Intervention(s)  Therapist will assign homework as appropriate  provide encourage and accountability to work out.  Problem solve when necessary.    Objective #B  Client will meditate every day  Patient will use relaxation strategies one times per day to reduce the physical symptoms of anxiety.  Status: New - Date: 12/23/20     Intervention(s)  Therapist will assign homework as appropriate  problem solve as necessary.    Objective #C  \"I want to work on procrastination when necessary\"  Patient will use cognitive strategies identified in therapy to challenge anxious thoughts.  Status: New - Date: 12/23/20 " "    Intervention(s)  Therapist will assign homework as appropriate  teach on topics relating to procrastination  Identifty and problem solve barriers to reaching goal.      Goal 2: Patient will develop a healthy sleep routine.    I will know I've met my goal when I can fall asleep consistently and I don't have a good reason to have disruptive sleep.  I would like to be able to fall asleep within ten minutes and be able to fall back asleep within ten minutes if I wake up in the middle of the night.      Objective #A Start to develop a sleep schedule    Status: New - Date: 12/23/20     Patient will identify healthy sleep hygiene practices.    Intervention(s)  Therapist will assign homework as appropriate  teach about sleep hygiene/routine.    Objective #B  Patient will TBD.    Status: TBD     Intervention(s)  Therapist will TBD.    Goal 3: Patient will \"learn skills to manage anxiety.\"    I will know I've met my goal when I feel like my level of anxiety is appropriate to the situations that I'm in.   Client would like to rate each day, \"1 (feeling that the anxiety he felt was appropriate) to 5 (where I'm at now and I'm constantly vigilante).\"   His goal is to have scores less than 3 for 4 out of 5 days at work for four consecutive weeks.    Objective #A Address the \" 'hypervigilence' I feel at work.  I like the energy it gives me but I don't think it's good for my body to constantly be in that state.    Status: New - Date: 12/23/20     Patient will identify triggering fears / thoughts that contribute to feeling anxious.    Intervention(s)  Therapist will assign homework as appropriate  teach about hypervigilence as a defense mechanism  Identify and problem solve barriers to reaching goal.    Objective #B  Learn to relax at home.  Patient will use relaxation strategies 1 times per day to reduce the physical symptoms of anxiety.    Status: New - Date: 12/23/20     Intervention(s)  Therapist will assign homework as " appropriate  teach relaxation skills  Problem solve around barriers.      Patient has not reviewed nor agreed to the above plan.      Farhat Russell  December 16, 2020

## 2020-12-23 ENCOUNTER — VIRTUAL VISIT (OUTPATIENT)
Dept: PSYCHOLOGY | Facility: CLINIC | Age: 25
End: 2020-12-23
Payer: COMMERCIAL

## 2020-12-23 DIAGNOSIS — F33.41 MAJOR DEPRESSIVE DISORDER, RECURRENT EPISODE, IN PARTIAL REMISSION (H): Primary | ICD-10-CM

## 2020-12-23 PROCEDURE — 90837 PSYTX W PT 60 MINUTES: CPT | Mod: 95 | Performed by: PSYCHOLOGIST

## 2020-12-23 NOTE — PROGRESS NOTES
"                                           Progress Note    Patient Name: Carrillo Aceves  Date: 12/23/20         Service Type: Individual      Session Start Time: 5:00pm Session End Time: 6:06     Session Length: 66 minutes    Session #: 4    Attendees: Client attended alone    Service Modality:  Video Visit:      Provider verified identity through the following two step process.  Patient provided:  Patient photo    Telemedicine Visit: The patient's condition can be safely assessed and treated via synchronous audio and visual telemedicine encounter.      Reason for Telemedicine Visit: Services only offered telehealth    Originating Site (Patient Location): Patient's home    Distant Site (Provider Location): Provider Remote Setting    Consent:  The patient/guardian has verbally consented to: the potential risks and benefits of telemedicine (video visit) versus in person care; bill my insurance or make self-payment for services provided; and responsibility for payment of non-covered services.     Patient would like the video invitation sent by: Send to e-mail at: [email or web address]    Mode of Communication:  Video Conference via Amwell    As the provider I attest to compliance with applicable laws and regulations related to telemedicine.     Treatment Plan Last Reviewed: Started collaborating on this session  PHQ-9 / DORY-7 : 14 / 10 (on 12/3/20)    DATA  Interactive Complexity: No  Crisis: No       Progress Since Last Session (Related to Symptoms / Goals / Homework):   Symptoms: \"I'm alright.  Didn't sleep to well last night, so, a little tired.\"    Homework: N/A      Episode of Care Goals: N/A     Current / Ongoing Stressors and Concerns:   Finishing school     Treatment Objective(s) Addressed in This Session:   Collaborate on creation of a Treatment Plan       Intervention:   CBT: Psychoeducation; pattern recognition; problem solved; explored triggers  Validation and other rapport building skills; open-ended and " "clarifying questions; explored beginnings of anxiety        ASSESSMENT: Current Emotional / Mental Status (status of significant symptoms):   Risk status (Self / Other harm or suicidal ideation)   Patient denies current fears or concerns for personal safety.   Patient denies current or recent suicidal ideation or behaviors.   Patient denies current or recent homicidal ideation or behaviors.   Patient denies current or recent self injurious behavior or ideation.   Patient denies other safety concerns.   Patient reports there has been no change in risk factors since their last session.     Patient reports there has been no change in protective factors since their last session.     Recommended that patient call 911 or go to the local ED should there be a change in any of these risk factors.     Appearance:   Appropriate    Eye Contact:   Good    Psychomotor Behavior: Normal    Attitude:   Cooperative    Orientation:   All   Speech    Rate / Production: Normal/ Responsive Normal     Volume:  Normal    Mood:    Euthymic   Affect:    Appropriate    Thought Content:  Clear    Thought Form:  Coherent  Logical    Insight:    Good  and Fair      Medication Review:   Changes to psychiatric medications, see updated Medication List in EPIC.      Medication Compliance:   Yes     Changes in Health Issues:   None reported     Chemical Use Review:   Substance Use: Chemical use reviewed, no active concerns identified      Tobacco Use: No current tobacco use.      Diagnosis:  1. Major depressive disorder, recurrent episode, in partial remission (H)        Collateral Reports Completed:   Not Applicable    PLAN: (Patient Tasks / Therapist Tasks / Other)  Client will explore further  \"the difference between the class you failed (Gen Ed) and the one you got an 'A' in (Comp Prog. - major)?  His next appointment we will explore how he experiences anxiety currently and skills to use to manage his anxiety.  His next scheduled appointment is " "January 6, 2021.        Farhat Russell                                                         ______________________________________________________________________    Treatment Plan    Patient's Name: Carrillo Aceves  YOB: 1995    Date: 12/16/20    DSM5 Diagnoses: 296.35 (F33.41)  Major Depressive Disorder, Recurrent Episode, In partial remission _ and With anxious distress  Psychosocial / Contextual Factors: School  WHODAS: 32 (on 12/3/20)    Referral / Collaboration:  Referral to another professional/service is not indicated at this time..    Anticipated number of session or this episode of care: 12      MeasurableTreatment Goal(s) related to diagnosis / functional impairment(s)  Goal 1: Patient will have control over his day-to-day schedule.  I want to establish a routine everyday.  I want to meditate every day and workout four times a week.  Falling asleep can be an issue too.  I want to work on procrastination I have.\"    I will know I've met my goal when I not only have a consistent daily routine but also being able to follow through on plans that I make when there isn't a good reason for me not to follow through.      Objective #A Client will workout four times a week    Patient will exercise for 30 minutes 4 times per week for 45 minutes.  Status: New - Date: 12/23/20     Intervention(s)  Therapist will assign homework as appropriate  provide encourage and accountability to work out.  Problem solve when necessary.    Objective #B  Client will meditate every day  Patient will use relaxation strategies one times per day to reduce the physical symptoms of anxiety.  Status: New - Date: 12/23/20     Intervention(s)  Therapist will assign homework as appropriate  problem solve as necessary.    Objective #C  \"I want to work on procrastination when necessary\"  Patient will use cognitive strategies identified in therapy to challenge anxious thoughts.  Status: New - Date: 12/23/20 " "    Intervention(s)  Therapist will assign homework as appropriate  teach on topics relating to procrastination  Identifty and problem solve barriers to reaching goal.      Goal 2: Patient will develop a healthy sleep routine.    I will know I've met my goal when I can fall asleep consistently and I don't have a good reason to have disruptive sleep.  I would like to be able to fall asleep within ten minutes and be able to fall back asleep within ten minutes if I wake up in the middle of the night.      Objective #A Start to develop a sleep schedule    Status: New - Date: 12/23/20     Patient will identify healthy sleep hygiene practices.    Intervention(s)  Therapist will assign homework as appropriate  teach about sleep hygiene/routine.    Objective #B  Patient will TBD.    Status: TBD     Intervention(s)  Therapist will TBD.    Goal 3: Patient will \"learn skills to manage anxiety.\"    I will know I've met my goal when I feel like my level of anxiety is appropriate to the situations that I'm in.   Client would like to rate each day, \"1 (feeling that the anxiety he felt was appropriate) to 5 (where I'm at now and I'm constantly vigilante).\"   His goal is to have scores less than 3 for 4 out of 5 days at work for four consecutive weeks.    Objective #A Address the \" 'hypervigilence' I feel at work.  I like the energy it gives me but I don't think it's good for my body to constantly be in that state.    Status: New - Date: 12/23/20     Patient will identify triggering fears / thoughts that contribute to feeling anxious.    Intervention(s)  Therapist will assign homework as appropriate  teach about hypervigilence as a defense mechanism  Identify and problem solve barriers to reaching goal.    Objective #B  Learn to relax at home.  Patient will use relaxation strategies 1 times per day to reduce the physical symptoms of anxiety.    Status: New - Date: 12/23/20     Intervention(s)  Therapist will assign homework as " appropriate  teach relaxation skills  Problem solve around barriers.      Patient has not reviewed nor agreed to the above plan.      Farhat Russell  December 23, 2020

## 2021-01-06 ENCOUNTER — VIRTUAL VISIT (OUTPATIENT)
Dept: PSYCHOLOGY | Facility: CLINIC | Age: 26
End: 2021-01-06
Payer: COMMERCIAL

## 2021-01-06 DIAGNOSIS — F33.41 MAJOR DEPRESSIVE DISORDER, RECURRENT EPISODE, IN PARTIAL REMISSION (H): Primary | ICD-10-CM

## 2021-01-06 PROCEDURE — 90834 PSYTX W PT 45 MINUTES: CPT | Mod: GT | Performed by: PSYCHOLOGIST

## 2021-01-06 NOTE — PROGRESS NOTES
"                                           Progress Note    Patient Name: Carrillo Aceves  Date: 1/6/21         Service Type: Individual      Session Start Time: 4:01pm Session End Time: 4:53pm     Session Length: 52 minutes    Session #: 5    Attendees: Client attended alone    Service Modality:  Video Visit:      Provider verified identity through the following two step process.  Patient provided:  Patient photo    Telemedicine Visit: The patient's condition can be safely assessed and treated via synchronous audio and visual telemedicine encounter.      Reason for Telemedicine Visit: Services only offered telehealth    Originating Site (Patient Location): Patient's home    Distant Site (Provider Location): Provider Remote Setting    Consent:  The patient/guardian has verbally consented to: the potential risks and benefits of telemedicine (video visit) versus in person care; bill my insurance or make self-payment for services provided; and responsibility for payment of non-covered services.     Patient would like the video invitation sent by: Send to e-mail at: [email or web address]    Mode of Communication:  Video Conference via [employer]    As the provider I attest to compliance with applicable laws and regulations related to telemedicine.     Treatment Plan Last Reviewed: Started collaborating on this session  PHQ-9 / DORY-7 : 14 / 10 (on 12/3/20)    DATA  Interactive Complexity: No  Crisis: No       Progress Since Last Session (Related to Symptoms / Goals / Homework):   Symptoms: \"I'm alright.  It's been up-and-down.\"  He described getting a tight throat and my heart dropping (like falling in a rollercoaster).\"  Now I've been in a pretty stable mood.  I'm high strung.      Homework: Partially completed      Episode of Care Goals: Satisfactory progress - ACTION (Actively working towards change); Intervened by reinforcing change plan / affirming steps taken     Current / Ongoing Stressors and Concerns:   Finishing " school; work     Treatment Objective(s) Addressed in This Session:   identify his initial signs or symptoms of anxiety  Described and labeled how client experiences emotions and provided example of how context matters..  Described and labeled emotions he noticed during the week.    Client believes anxiety can lead to him staying in bed and not getting done with what he has structured for the day.       Intervention:   CBT: Psychoeducation; use of an analogy, pattern recognition; problem solved; explored triggers; explored emotions  Validation and other rapport building skills; open-ended and clarifying questions; explored beginnings of anxiety        ASSESSMENT: Current Emotional / Mental Status (status of significant symptoms):   Risk status (Self / Other harm or suicidal ideation)   Patient denies current fears or concerns for personal safety.   Patient denies current or recent suicidal ideation or behaviors.   Patient denies current or recent homicidal ideation or behaviors.   Patient denies current or recent self injurious behavior or ideation.   Patient denies other safety concerns.   Patient reports there has been no change in risk factors since their last session.     Patient reports there has been no change in protective factors since their last session.     Recommended that patient call 911 or go to the local ED should there be a change in any of these risk factors.     Appearance:   Appropriate    Eye Contact:   Good    Psychomotor Behavior: Normal    Attitude:   Cooperative    Orientation:   All   Speech    Rate / Production: Normal/ Responsive Normal     Volume:  Normal    Mood:    Euthymic   Affect:    Appropriate    Thought Content:  Clear    Thought Form:  Coherent  Logical    Insight:    Good  and Fair      Medication Review:   No changes to current psychiatric medication(s)     Medication Compliance:   Yes     Changes in Health Issues:   None reported     Chemical Use Review:   Substance Use:  "Chemical use reviewed, no active concerns identified      Tobacco Use: No current tobacco use.      Diagnosis:  1. 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  I'm not too sure what was going on.\"  He would like to discuss this next week.  He will also track emotions, how he experiences them, and the context in which he. His next scheduled appointment is January 13, 2021.        Farhat Russell                                                         ______________________________________________________________________    Treatment Plan    Patient's Name: Carrillo Aceves  YOB: 1995    Date: 12/16/20    DSM5 Diagnoses: 296.35 (F33.41)  Major Depressive Disorder, Recurrent Episode, In partial remission _ and With anxious distress  Psychosocial / Contextual Factors: School  WHODAS: 32 (on 12/3/20)    Referral / Collaboration:  Referral to another professional/service is not indicated at this time..    Anticipated number of session or this episode of care: 12      MeasurableTreatment Goal(s) related to diagnosis / functional impairment(s)  Goal 1: Patient will have control over his day-to-day schedule.  I want to establish a routine everyday.  I want to meditate every day and workout four times a week.  Falling asleep can be an issue too.  I want to work on procrastination I have.\"    I will know I've met my goal when I not only have a consistent daily routine but also being able to follow through on plans that I make when there isn't a good reason for me not to follow through.      Objective #A Client will workout four times a week    Patient will exercise for 30 minutes 4 times per " "week for 45 minutes.  Status: New - Date: 12/23/20     Intervention(s)  Therapist will assign homework as appropriate  provide encourage and accountability to work out.  Problem solve when necessary.    Objective #B  Client will meditate every day  Patient will use relaxation strategies one times per day to reduce the physical symptoms of anxiety.  Status: New - Date: 12/23/20     Intervention(s)  Therapist will assign homework as appropriate  problem solve as necessary.    Objective #C  \"I want to work on procrastination when necessary\"  Patient will use cognitive strategies identified in therapy to challenge anxious thoughts.  Status: New - Date: 12/23/20     Intervention(s)  Therapist will assign homework as appropriate  teach on topics relating to procrastination  Identifty and problem solve barriers to reaching goal.      Goal 2: Patient will develop a healthy sleep routine.    I will know I've met my goal when I can fall asleep consistently and I don't have a good reason to have disruptive sleep.  I would like to be able to fall asleep within ten minutes and be able to fall back asleep within ten minutes if I wake up in the middle of the night.      Objective #A Start to develop a sleep schedule    Status: New - Date: 12/23/20     Patient will identify healthy sleep hygiene practices.    Intervention(s)  Therapist will assign homework as appropriate  teach about sleep hygiene/routine.    Objective #B  Patient will TBD.    Status: TBD     Intervention(s)  Therapist will TBD.    Goal 3: Patient will \"learn skills to manage anxiety.\"    I will know I've met my goal when I feel like my level of anxiety is appropriate to the situations that I'm in.   Client would like to rate each day, \"1 (feeling that the anxiety he felt was appropriate) to 5 (where I'm at now and I'm constantly vigilante).\"   His goal is to have scores less than 3 for 4 out of 5 days at work for four consecutive weeks.    Objective #A Address the " "\" 'hypervigilence' I feel at work.  I like the energy it gives me but I don't think it's good for my body to constantly be in that state.    Status: New - Date: 12/23/20     Patient will identify triggering fears / thoughts that contribute to feeling anxious.    Intervention(s)  Therapist will assign homework as appropriate  teach about hypervigilence as a defense mechanism  Identify and problem solve barriers to reaching goal.    Objective #B  Learn to relax at home.  Patient will use relaxation strategies 1 times per day to reduce the physical symptoms of anxiety.    Status: New - Date: 12/23/20     Intervention(s)  Therapist will assign homework as appropriate  teach relaxation skills  Problem solve around barriers.      Patient has not reviewed nor agreed to the above plan.      Farhat Russell  January 6, 2021  "

## 2021-01-13 ENCOUNTER — VIRTUAL VISIT (OUTPATIENT)
Dept: PSYCHOLOGY | Facility: CLINIC | Age: 26
End: 2021-01-13
Payer: COMMERCIAL

## 2021-01-13 DIAGNOSIS — F33.41 MAJOR DEPRESSIVE DISORDER, RECURRENT EPISODE, IN PARTIAL REMISSION (H): Primary | ICD-10-CM

## 2021-01-13 PROCEDURE — 90834 PSYTX W PT 45 MINUTES: CPT | Mod: GT | Performed by: PSYCHOLOGIST

## 2021-01-13 NOTE — PROGRESS NOTES
"                                           Progress Note    Patient Name: Carrillo Aceves  Date: 1/13/21         Service Type: Individual      Session Start Time: 4:02pm Session End Time: 4:54pm     Session Length: 52 minutes    Session #: 6    Attendees: Client attended alone    Service Modality:  Video Visit:      Provider verified identity through the following two step process.  Patient provided:  Patient photo    Telemedicine Visit: The patient's condition can be safely assessed and treated via synchronous audio and visual telemedicine encounter.      Reason for Telemedicine Visit: Services only offered telehealth    Originating Site (Patient Location): Patient's home    Distant Site (Provider Location): Provider Remote Setting    Consent:  The patient/guardian has verbally consented to: the potential risks and benefits of telemedicine (video visit) versus in person care; bill my insurance or make self-payment for services provided; and responsibility for payment of non-covered services.     Patient would like the video invitation sent by: Send to e-mail at: [email or web address]    Mode of Communication:  Video Conference via Amwell    As the provider I attest to compliance with applicable laws and regulations related to telemedicine.     Treatment Plan Last Reviewed: Started collaborating on this session  PHQ-9 / DORY-7 : 14 / 10 (on 12/3/20)    DATA  Interactive Complexity: No  Crisis: No       Progress Since Last Session (Related to Symptoms / Goals / Homework):   Symptoms: \"My moods been pretty good.\"      Homework: Partially completed      Episode of Care Goals: Satisfactory progress - ACTION (Actively working towards change); Intervened by reinforcing change plan / affirming steps taken     Current / Ongoing Stressors and Concerns:   Starting school; work     Treatment Objective(s) Addressed in This Session:   Discussed client's history of emotions, anxiety in particular, and related it to his current " "issues.  Described how client experiences emotions.     NOTE:  Client believes anxiety can lead to him staying in bed and not getting done with what he has structured for the day.    Client discussed taking three classes and working one day per week during his last semester.  He went through a period of only being able to think very concretely.  For example, he could only do math problems that followed the example in the book.  He also describes feeling claustrophobic in classes.  He stated his focus narrowed.  Client agreed that he may have dissociating in class to manage anxiety and this happened in grade and high school.  Even in grade school he did not want to interact with others.  He felt he had to interact in a specific way; \"I needed to either match their mood or energy. It was something I had to do because it was expected of me (interacting with peers). If I didn't, I might upset them and end up not having a group to sit with at lunch or pair up with when needed.\"    His father was in the  and did not express much emotion.       Intervention:   CBT: Psychoeducation; pattern recognition; problem solved; explored triggers; explored emotions; taught Boxed Breathing and Progressive Relaxation  Validation and other rapport building skills; open-ended and clarifying questions; explored beginnings of anxiety; clarified a goal (to eventually be off medications)        ASSESSMENT: Current Emotional / Mental Status (status of significant symptoms):   Risk status (Self / Other harm or suicidal ideation)   Patient denies current fears or concerns for personal safety.   Patient denies current or recent suicidal ideation or behaviors.   Patient denies current or recent homicidal ideation or behaviors.   Patient denies current or recent self injurious behavior or ideation.   Patient denies other safety concerns.   Patient reports there has been no change in risk factors since their last session.     Patient " "reports there has been no change in protective factors since their last session.     Recommended that patient call 911 or go to the local ED should there be a change in any of these risk factors.     Appearance:   Appropriate    Eye Contact:   Good    Psychomotor Behavior: Normal    Attitude:   Cooperative    Orientation:   All   Speech    Rate / Production: Normal/ Responsive Normal     Volume:  Normal    Mood:    Euthymic   Affect:    Appropriate    Thought Content:  Clear    Thought Form:  Coherent  Logical    Insight:    Good  and Fair      Medication Review:   No changes to current psychiatric medication(s)    The client stated he \"feels fine now but I would like to feel this way off medications.\"     Medication Compliance:   Yes     Changes in Health Issues:   None reported     Chemical Use Review:   Substance Use: Chemical use reviewed, no active concerns identified      Tobacco Use: No current tobacco use.      Diagnosis:  1. Major depressive disorder, recurrent episode, in partial remission (H)        Collateral Reports Completed:   Not Applicable    PLAN: (Patient Tasks / Therapist Tasks / Other)  Client will practice progressive relaxation up to twice a day for the next week.  He will also practice a breathing technique or meditation at night to help relax.  His next scheduled appointment is January 20, 2021.        Farhat Russell                                                         ______________________________________________________________________    Treatment Plan    Patient's Name: Carrillo Aceves  YOB: 1995    Date: 12/16/20    DSM5 Diagnoses: 296.35 (F33.41)  Major Depressive Disorder, Recurrent Episode, In partial remission _ and With anxious distress  Psychosocial / Contextual Factors: School  WHODAS: 32 (on 12/3/20)    Referral / Collaboration:  Referral to another professional/service is not indicated at this time..    Anticipated number of session or this episode of care: " "12      MeasurableTreatment Goal(s) related to diagnosis / functional impairment(s)  Goal 1: Patient will have control over his day-to-day schedule.  I want to establish a routine everyday.  I want to meditate every day and workout four times a week.  Falling asleep can be an issue too.  I want to work on procrastination I have.\"    I will know I've met my goal when I not only have a consistent daily routine but also being able to follow through on plans that I make when there isn't a good reason for me not to follow through.      Objective #A Client will workout four times a week    Patient will exercise for 30 minutes 4 times per week for 45 minutes.  Status: New - Date: 12/23/20     Intervention(s)  Therapist will assign homework as appropriate  provide encourage and accountability to work out.  Problem solve when necessary.    Objective #B  Client will meditate every day  Patient will use relaxation strategies one times per day to reduce the physical symptoms of anxiety.  Status: New - Date: 12/23/20     Intervention(s)  Therapist will assign homework as appropriate  problem solve as necessary.    Objective #C  \"I want to work on procrastination when necessary\"  Patient will use cognitive strategies identified in therapy to challenge anxious thoughts.  Status: New - Date: 12/23/20     Intervention(s)  Therapist will assign homework as appropriate  teach on topics relating to procrastination  Identifty and problem solve barriers to reaching goal.      Goal 2: Patient will develop a healthy sleep routine.    I will know I've met my goal when I can fall asleep consistently and I don't have a good reason to have disruptive sleep.  I would like to be able to fall asleep within ten minutes and be able to fall back asleep within ten minutes if I wake up in the middle of the night.      Objective #A Start to develop a sleep schedule    Status: New - Date: 12/23/20     Patient will identify healthy sleep hygiene " "practices.    Intervention(s)  Therapist will assign homework as appropriate  teach about sleep hygiene/routine.    Objective #B  Patient will TBD.    Status: TBD     Intervention(s)  Therapist will TBD.    Goal 3: Patient will \"learn skills to manage anxiety.\"    I will know I've met my goal when I feel like my level of anxiety is appropriate to the situations that I'm in.   Client would like to rate each day, \"1 (feeling that the anxiety he felt was appropriate) to 5 (where I'm at now and I'm constantly vigilante).\"   His goal is to have scores less than 3 for 4 out of 5 days at work for four consecutive weeks.    Objective #A Address the \" 'hypervigilence' I feel at work.  I like the energy it gives me but I don't think it's good for my body to constantly be in that state.    Status: New - Date: 12/23/20     Patient will identify triggering fears / thoughts that contribute to feeling anxious.    Intervention(s)  Therapist will assign homework as appropriate  teach about hypervigilence as a defense mechanism  Identify and problem solve barriers to reaching goal.    Objective #B  Learn to relax at home.  Patient will use relaxation strategies 1 times per day to reduce the physical symptoms of anxiety.    Status: New - Date: 12/23/20     Intervention(s)  Therapist will assign homework as appropriate  teach relaxation skills  Problem solve around barriers.      Patient has not reviewed nor agreed to the above plan.      Farhat Russell  January 13, 2021  "

## 2021-01-20 ENCOUNTER — VIRTUAL VISIT (OUTPATIENT)
Dept: PSYCHOLOGY | Facility: CLINIC | Age: 26
End: 2021-01-20
Payer: COMMERCIAL

## 2021-01-20 DIAGNOSIS — F33.41 MAJOR DEPRESSIVE DISORDER, RECURRENT EPISODE, IN PARTIAL REMISSION (H): Primary | ICD-10-CM

## 2021-01-20 PROCEDURE — 90834 PSYTX W PT 45 MINUTES: CPT | Mod: GT | Performed by: PSYCHOLOGIST

## 2021-01-20 NOTE — PROGRESS NOTES
"                                           Progress Note    Patient Name: Carrillo Aceves  Date: 1/13/21         Service Type: Individual      Session Start Time: 4:01pm Session End Time: 4:53pm     Session Length: 52 minutes    Session #: 7    Attendees: Client attended alone    Service Modality:  Video Visit:      Provider verified identity through the following two step process.  Patient provided:  Patient photo    Telemedicine Visit: The patient's condition can be safely assessed and treated via synchronous audio and visual telemedicine encounter.      Reason for Telemedicine Visit: Services only offered telehealth    Originating Site (Patient Location): Patient's home    Distant Site (Provider Location): Provider Remote Setting    Consent:  The patient/guardian has verbally consented to: the potential risks and benefits of telemedicine (video visit) versus in person care; bill my insurance or make self-payment for services provided; and responsibility for payment of non-covered services.     Patient would like the video invitation sent by: Send to e-mail at: [email or web address]    Mode of Communication:  Video Conference via [employer]well    As the provider I attest to compliance with applicable laws and regulations related to telemedicine.     Treatment Plan Last Reviewed: Started collaborating on this session  PHQ-9 / DORY-7 : 14 / 10 (on 12/3/20)    DATA  Interactive Complexity: No  Crisis: No       Progress Since Last Session (Related to Symptoms / Goals / Homework):   Symptoms: \" I'm ok.\"  Anxiety is still present.     Homework: Partially completed      Episode of Care Goals: Satisfactory progress - ACTION (Actively working towards change); Intervened by reinforcing change plan / affirming steps taken     Current / Ongoing Stressors and Concerns:   Starting school; work; not sleeping as well     Treatment Objective(s) Addressed in This Session:   identify his fears / thoughts that contribute to feeling " "anxious  identify multiple sleep hygiene practices     NOTE from 1/13/21 session:  Client believes anxiety can lead to him staying in bed and not getting done with what he has structured for the day.    Client discussed taking three classes and working one day per week during his last semester.  He went through a period of only being able to think very concretely.  For example, he could only do math problems that followed the example in the book.  He also describes feeling claustrophobic in classes.  He stated his focus narrowed.  Client agreed that he may have dissociating in class to manage anxiety and this happened in grade and high school.  Even in grade school he did not want to interact with others.  He felt he had to interact in a specific way; \"I needed to either match their mood or energy. It was something I had to do because it was expected of me (interacting with peers). If I didn't, I might upset them and end up not having a group to sit with at lunch or pair up with when needed.\"    His father was in the  and did not express much emotion.       Intervention:   CBT: Psychoeducation; pattern recognition; problem solved; explored triggers; explored patterns of anxiety; taught Modified Progressive Muscle Relaxation  Educated on sleep hygiene techniques; Validation and other rapport building skills; open-ended and clarifying questions; explored beginnings of anxiety; demonstrated deep breathing and muscle relaxation        ASSESSMENT: Current Emotional / Mental Status (status of significant symptoms):   Risk status (Self / Other harm or suicidal ideation)   Patient denies current fears or concerns for personal safety.   Patient denies current or recent suicidal ideation or behaviors.   Patient denies current or recent homicidal ideation or behaviors.   Patient denies current or recent self injurious behavior or ideation.   Patient denies other safety concerns.   Patient reports there has been no " change in risk factors since their last session.     Patient reports there has been no change in protective factors since their last session.     Recommended that patient call 911 or go to the local ED should there be a change in any of these risk factors.     Appearance:   Appropriate    Eye Contact:   Good    Psychomotor Behavior: Normal    Attitude:   Cooperative    Orientation:   All   Speech    Rate / Production: Normal/ Responsive Normal     Volume:  Normal    Mood:    Euthymic   Affect:    Appropriate    Thought Content:  Clear    Thought Form:  Coherent  Logical    Insight:    Good  and Fair      Medication Review:   No changes to current psychiatric medication(s)     Medication Compliance:   Yes     Changes in Health Issues:   None reported     Chemical Use Review:   Substance Use: Chemical use reviewed, no active concerns identified      Tobacco Use: No current tobacco use.      Diagnosis:  1. Major depressive disorder, recurrent episode, in partial remission (H)        Collateral Reports Completed:   Not Applicable    PLAN: (Patient Tasks / Therapist Tasks / Other)  Client will practice a modified progressive muscle relaxation (chest, shoulders/neck, face only) up to twice a day for the next week.  He will also practice a deep breathing technique at night to help relax. In addition, he will work on exercising to help with sleep.  His next scheduled appointment is January 27, 2021.        Farhat Russell                                                         ______________________________________________________________________    Treatment Plan    Patient's Name: Carrillo Aceves  YOB: 1995    Date: 12/16/20    DSM5 Diagnoses: 296.35 (F33.41)  Major Depressive Disorder, Recurrent Episode, In partial remission _ and With anxious distress  Psychosocial / Contextual Factors: School  WHODAS: 32 (on 12/3/20)    Referral / Collaboration:  Referral to another professional/service is not indicated  "at this time..    Anticipated number of session or this episode of care: 12      MeasurableTreatment Goal(s) related to diagnosis / functional impairment(s)  Goal 1: Patient will have control over his day-to-day schedule.  I want to establish a routine everyday.  I want to meditate every day and workout four times a week.  Falling asleep can be an issue too.  I want to work on procrastination I have.\"    I will know I've met my goal when I not only have a consistent daily routine but also being able to follow through on plans that I make when there isn't a good reason for me not to follow through.      Objective #A Client will workout four times a week    Patient will exercise for 30 minutes 4 times per week for 45 minutes.  Status: New - Date: 12/23/20     Intervention(s)  Therapist will assign homework as appropriate  provide encourage and accountability to work out.  Problem solve when necessary.    Objective #B  Client will meditate every day  Patient will use relaxation strategies one times per day to reduce the physical symptoms of anxiety.  Status: New - Date: 12/23/20     Intervention(s)  Therapist will assign homework as appropriate  problem solve as necessary.    Objective #C  \"I want to work on procrastination when necessary\"  Patient will use cognitive strategies identified in therapy to challenge anxious thoughts.  Status: New - Date: 12/23/20     Intervention(s)  Therapist will assign homework as appropriate  teach on topics relating to procrastination  Identifty and problem solve barriers to reaching goal.      Goal 2: Patient will develop a healthy sleep routine.    I will know I've met my goal when I can fall asleep consistently and I don't have a good reason to have disruptive sleep.  I would like to be able to fall asleep within ten minutes and be able to fall back asleep within ten minutes if I wake up in the middle of the night.      Objective #A Start to develop a sleep schedule    Status: New " "- Date: 12/23/20     Patient will identify healthy sleep hygiene practices.    Intervention(s)  Therapist will assign homework as appropriate  teach about sleep hygiene/routine.    Objective #B  Patient will TBD.    Status: TBD     Intervention(s)  Therapist will TBD.    Goal 3: Patient will \"learn skills to manage anxiety.\"    I will know I've met my goal when I feel like my level of anxiety is appropriate to the situations that I'm in.   Client would like to rate each day, \"1 (feeling that the anxiety he felt was appropriate) to 5 (where I'm at now and I'm constantly vigilante).\"   His goal is to have scores less than 3 for 4 out of 5 days at work for four consecutive weeks.    Objective #A Address the \" 'hypervigilence' I feel at work.  I like the energy it gives me but I don't think it's good for my body to constantly be in that state.    Status: New - Date: 12/23/20     Patient will identify triggering fears / thoughts that contribute to feeling anxious.    Intervention(s)  Therapist will assign homework as appropriate  teach about hypervigilence as a defense mechanism  Identify and problem solve barriers to reaching goal.    Objective #B  Learn to relax at home.  Patient will use relaxation strategies 1 times per day to reduce the physical symptoms of anxiety.    Status: New - Date: 12/23/20     Intervention(s)  Therapist will assign homework as appropriate  teach relaxation skills  Problem solve around barriers.      Patient has not reviewed nor agreed to the above plan.      Farhat Russell  January 20, 2021  "

## 2021-01-27 ENCOUNTER — VIRTUAL VISIT (OUTPATIENT)
Dept: PSYCHOLOGY | Facility: CLINIC | Age: 26
End: 2021-01-27
Payer: COMMERCIAL

## 2021-01-27 DIAGNOSIS — F33.41 MAJOR DEPRESSIVE DISORDER, RECURRENT EPISODE, IN PARTIAL REMISSION (H): Primary | ICD-10-CM

## 2021-01-27 PROCEDURE — 90834 PSYTX W PT 45 MINUTES: CPT | Mod: GT | Performed by: PSYCHOLOGIST

## 2021-01-27 NOTE — PROGRESS NOTES
"                                           Progress Note    Patient Name: Carrillo Aceves  Date: 1/27/21         Service Type: Individual      Session Start Time: 4:01pm Session End Time: 4:53pm     Session Length: 52 minutes     Session #: 8    Attendees: Client attended alone    Service Modality:  Video Visit:      Provider verified identity through the following two step process.  Patient provided:  Patient photo    Telemedicine Visit: The patient's condition can be safely assessed and treated via synchronous audio and visual telemedicine encounter.      Reason for Telemedicine Visit: Services only offered telehealth    Originating Site (Patient Location): Patient's home    Distant Site (Provider Location): Provider Remote Setting    Consent:  The patient/guardian has verbally consented to: the potential risks and benefits of telemedicine (video visit) versus in person care; bill my insurance or make self-payment for services provided; and responsibility for payment of non-covered services.     Patient would like the video invitation sent by: Send to e-mail at: [email or web address]    Mode of Communication:  Video Conference via Amwell    As the provider I attest to compliance with applicable laws and regulations related to telemedicine.     Treatment Plan Last Reviewed: Started collaborating on this session  PHQ-9 / DORY-7 : 14 / 10 (on 12/3/20); 12 / 9 (on 1/27/21)    DATA  Interactive Complexity: No  Crisis: No       Progress Since Last Session (Related to Symptoms / Goals / Homework):   Symptoms: \" I'm ok.\" \"I've been feeling less interpersonal anxiety.  The depression comes-and-goes.\"     Homework: Partially completed      Episode of Care Goals: Satisfactory progress - ACTION (Actively working towards change); Intervened by reinforcing change plan / affirming steps taken     Current / Ongoing Stressors and Concerns:   Starting school; work; not sleeping as well; headache     Treatment Objective(s) Addressed in " "This Session:   Reviewed most recent DORY-7 and PHQ-9 and the pattern; discussed his experience with emotions, particularly negative emotions and connected this to disassociating. Discussed his possible hypnagogic dreaming and when it started.  Explored possibility of ADHD.     NOTE from 1/13/21 session:  Client stated that he missed the deadlines for Fall starts in college which led to increased depression, a pattern of depression for client is \"when I don't live up to my expectations of myself.\"  He describes how he handles disappointing himself as a \"dropping\" feeling in his chest area.  He added that he tends to disassociate from the feeling and manage it cognitively. Client experiences sympathy and \"cognitive empathy\" for others; however, not emotional empathy.  He stated he does not engage in \"a lot of negative self-talk.\"    Client explained, as a child, he would often retreat into a fantasy world to \"forget what was happening.\"  For instance, if his parents were frustrated that he was not going to pass a class. Or, \"when I was young young, I don't think it was intentional, it offered an escape from boredom. I remember being bored in school for years and it never really went away.\"     Intervention:   CBT: Psychoeducation; pattern recognition; problem solved (possible ADHD dx); explored triggers; explored history and patterns of anxiety and depression; reinforced practicing Modified Progressive Muscle Relaxation and Deep Breathing  Various rapport building skills; open-ended and clarifying questions        ASSESSMENT: Current Emotional / Mental Status (status of significant symptoms):   Risk status (Self / Other harm or suicidal ideation)   Patient denies current fears or concerns for personal safety.   Patient denies current or recent suicidal ideation or behaviors.   Patient denies current or recent homicidal ideation or behaviors.   Patient denies current or recent self injurious behavior or " ideation.   Patient denies other safety concerns.   Patient reports there has been no change in risk factors since their last session.     Patient reports there has been no change in protective factors since their last session.     Recommended that patient call 911 or go to the local ED should there be a change in any of these risk factors.     Appearance:   Appropriate    Eye Contact:   Good    Psychomotor Behavior: Normal    Attitude:   Cooperative    Orientation:   All   Speech    Rate / Production: Normal/ Responsive Normal     Volume:  Normal    Mood:    Euthymic   Affect:    Appropriate    Thought Content:  Clear    Thought Form:  Coherent  Logical    Insight:    Good  and Fair      Medication Review:   No changes to current psychiatric medication(s)     Medication Compliance:   Yes (He did miss one day)     Changes in Health Issues:   Yes: Client has had a headache since Sunday and a stiff neck     Chemical Use Review:   Substance Use: Chemical use reviewed, no active concerns identified      Tobacco Use: No current tobacco use.      Diagnosis:  1. Major depressive disorder, recurrent episode, in partial remission (H)        Collateral Reports Completed:   Not Applicable    PLAN: (Patient Tasks / Therapist Tasks / Other)  Client will continue to practice a modified progressive muscle relaxation (chest, shoulders/neck, face only) up to twice a day for the next week.  He will also continue to practice a deep breathing technique at night to help relax.  Client will put thought into what role emotions play in his life and research symptoms of ADHD to discuss next session.  He would also like to discuss, next session, that he gets discouraged quickly (like doing hard math probems).  His next scheduled appointment is February 15, 2021.        Farhat Russell                                                         ______________________________________________________________________    Treatment  "Plan    Patient's Name: Carrillo Aceves  YOB: 1995    Date: 12/16/20    DSM5 Diagnoses: 296.35 (F33.41)  Major Depressive Disorder, Recurrent Episode, In partial remission _ and With anxious distress  Psychosocial / Contextual Factors: School  WHODAS: 32 (on 12/3/20)    Referral / Collaboration:  Referral to another professional/service is not indicated at this time.    Anticipated number of session or this episode of care: 12      MeasurableTreatment Goal(s) related to diagnosis / functional impairment(s)  Goal 1: Patient will have control over his day-to-day schedule.  I want to establish a routine everyday.  I want to meditate every day and workout four times a week.  Falling asleep can be an issue too.  I want to work on procrastination I have.\"    I will know I've met my goal when I not only have a consistent daily routine but also being able to follow through on plans that I make when there isn't a good reason for me not to follow through.      Objective #A Client will workout four times a week    Patient will exercise for 30 minutes 4 times per week for 45 minutes.  Status: New - Date: 12/23/20     Intervention(s)  Therapist will assign homework as appropriate  provide encourage and accountability to work out.  Problem solve when necessary.    Objective #B  Client will meditate every day  Patient will use relaxation strategies one times per day to reduce the physical symptoms of anxiety.  Status: New - Date: 12/23/20     Intervention(s)  Therapist will assign homework as appropriate  problem solve as necessary.    Objective #C  \"I want to work on procrastination when necessary\"  Patient will use cognitive strategies identified in therapy to challenge anxious thoughts.  Status: New - Date: 12/23/20     Intervention(s)  Therapist will assign homework as appropriate  teach on topics relating to procrastination  Identifty and problem solve barriers to reaching goal.      Goal 2: Patient will develop a " "healthy sleep routine.    I will know I've met my goal when I can fall asleep consistently and I don't have a good reason to have disruptive sleep.  I would like to be able to fall asleep within ten minutes and be able to fall back asleep within ten minutes if I wake up in the middle of the night.      Objective #A Start to develop a sleep schedule    Status: New - Date: 12/23/20     Patient will identify healthy sleep hygiene practices.    Intervention(s)  Therapist will assign homework as appropriate  teach about sleep hygiene/routine.    Objective #B  Patient will TBD.    Status: TBD     Intervention(s)  Therapist will TBD.    Goal 3: Patient will \"learn skills to manage anxiety.\"    I will know I've met my goal when I feel like my level of anxiety is appropriate to the situations that I'm in.   Client would like to rate each day, \"1 (feeling that the anxiety he felt was appropriate) to 5 (where I'm at now and I'm constantly vigilante).\"   His goal is to have scores less than 3 for 4 out of 5 days at work for four consecutive weeks.    Objective #A Address the \" 'hypervigilence' I feel at work.  I like the energy it gives me but I don't think it's good for my body to constantly be in that state.    Status: New - Date: 12/23/20     Patient will identify triggering fears / thoughts that contribute to feeling anxious.    Intervention(s)  Therapist will assign homework as appropriate  teach about hypervigilence as a defense mechanism  Identify and problem solve barriers to reaching goal.    Objective #B  Learn to relax at home.  Patient will use relaxation strategies 1 times per day to reduce the physical symptoms of anxiety.    Status: New - Date: 12/23/20     Intervention(s)  Therapist will assign homework as appropriate  teach relaxation skills  Problem solve around barriers.      Patient has not reviewed nor agreed to the above plan.      Farhat Russell  January 27, 2021  "

## 2021-02-15 ENCOUNTER — VIRTUAL VISIT (OUTPATIENT)
Dept: PSYCHOLOGY | Facility: CLINIC | Age: 26
End: 2021-02-15
Payer: COMMERCIAL

## 2021-02-15 DIAGNOSIS — F33.41 MAJOR DEPRESSIVE DISORDER, RECURRENT EPISODE, IN PARTIAL REMISSION (H): Primary | ICD-10-CM

## 2021-02-15 PROCEDURE — 90834 PSYTX W PT 45 MINUTES: CPT | Mod: GT | Performed by: PSYCHOLOGIST

## 2021-02-15 NOTE — PROGRESS NOTES
"                                           Progress Note    Patient Name: Carrillo Aceves  Date: 2/15/21         Service Type: Individual      Session Start Time: 4:02pm Session End Time: 4:54pm     Session Length: 52 minutes     Session #: 9    Attendees: Client attended alone    Service Modality:  Video Visit:      Provider verified identity through the following two step process.  Patient provided:  Patient photo    Telemedicine Visit: The patient's condition can be safely assessed and treated via synchronous audio and visual telemedicine encounter.      Reason for Telemedicine Visit: Services only offered telehealth    Originating Site (Patient Location): Patient's home    Distant Site (Provider Location): Provider Remote Setting    Consent:  The patient/guardian has verbally consented to: the potential risks and benefits of telemedicine (video visit) versus in person care; bill my insurance or make self-payment for services provided; and responsibility for payment of non-covered services.     Patient would like the video invitation sent by: Send to e-mail at: [email or web address]    Mode of Communication:  Video Conference via Amwell    As the provider I attest to compliance with applicable laws and regulations related to telemedicine.     Treatment Plan Last Reviewed: Started collaborating on this session  PHQ-9 / DORY-7 : 14 / 10 (on 12/3/20); 12 / 9 (on 1/27/21)    DATA  Interactive Complexity: No  Crisis: No       Progress Since Last Session (Related to Symptoms / Goals / Homework):   Symptoms: \" I'm the same. \"     Homework: Partially completed      Episode of Care Goals: Satisfactory progress - ACTION (Actively working towards change); Intervened by reinforcing change plan / affirming steps taken     Current / Ongoing Stressors and Concerns:   Starting school; work     Treatment Objective(s) Addressed in This Session:   Started differential diagnosis; discussed his experience with emotions, particularly " negative emotions and connected this to disassociating. Explored possibility of ADHD vs Schizoid vs Anxiety.      Intervention:   CBT: Psychoeducation; pattern recognition; problem solved (possible ADHD dx; possible Schzoid); explored triggers; explored history and patterns of anxiety; evaluated his practicing Deep Breathing; Assigned Homework  Assigned MMPI-2; various rapport building skills; open-ended and clarifying questions        ASSESSMENT: Current Emotional / Mental Status (status of significant symptoms):   Risk status (Self / Other harm or suicidal ideation)   Patient denies current fears or concerns for personal safety.   Patient denies current or recent suicidal ideation or behaviors.   Patient denies current or recent homicidal ideation or behaviors.   Patient denies current or recent self injurious behavior or ideation.   Patient denies other safety concerns.   Patient reports there has been no change in risk factors since their last session.     Patient reports there has been no change in protective factors since their last session.     Recommended that patient call 911 or go to the local ED should there be a change in any of these risk factors.     Appearance:   Appropriate    Eye Contact:   Good    Psychomotor Behavior: Normal    Attitude:   Cooperative    Orientation:   All   Speech    Rate / Production: Normal/ Responsive Normal     Volume:  Normal    Mood:    Euthymic   Affect:    Appropriate    Thought Content:  Clear    Thought Form:  Coherent  Logical    Insight:    Good  and Fair      Medication Review:   No changes to current psychiatric medication(s)     Medication Compliance:   Yes (He did miss one day)     Changes in Health Issues:   Yes: Client has had a headache since Sunday and a stiff neck     Chemical Use Review:   Substance Use: Chemical use reviewed, no active concerns identified      Tobacco Use: No current tobacco use.      Diagnosis:  1. Major depressive disorder, recurrent  "episode, in partial remission (H)        Collateral Reports Completed:   Not Applicable    PLAN: (Patient Tasks / Therapist Tasks / Other)  Sent client the MMPI-2 invite to complete and return as part of his differential diagnosis to direct more accurate treatment.  His next scheduled appointment is February 22, 2021.        Farhat Russell                                                         ______________________________________________________________________    Treatment Plan    Patient's Name: Carrillo Aceves  YOB: 1995    Date: 12/16/20    DSM5 Diagnoses: 296.35 (F33.41)  Major Depressive Disorder, Recurrent Episode, In partial remission _ and With anxious distress  Psychosocial / Contextual Factors: School  WHODAS: 32 (on 12/3/20)    Referral / Collaboration:  Referral to another professional/service is not indicated at this time.    Anticipated number of session or this episode of care: 12      MeasurableTreatment Goal(s) related to diagnosis / functional impairment(s)  Goal 1: Patient will have control over his day-to-day schedule.  I want to establish a routine everyday.  I want to meditate every day and workout four times a week.  Falling asleep can be an issue too.  I want to work on procrastination I have.\"    I will know I've met my goal when I not only have a consistent daily routine but also being able to follow through on plans that I make when there isn't a good reason for me not to follow through.      Objective #A Client will workout four times a week    Patient will exercise for 30 minutes 4 times per week for 45 minutes.  Status: New - Date: 12/23/20     Intervention(s)  Therapist will assign homework as appropriate  provide encourage and accountability to work out.  Problem solve when necessary.    Objective #B  Client will meditate every day  Patient will use relaxation strategies one times per day to reduce the physical symptoms of anxiety.  Status: New - Date: 12/23/20 " "    Intervention(s)  Therapist will assign homework as appropriate  problem solve as necessary.    Objective #C  \"I want to work on procrastination when necessary\"  Patient will use cognitive strategies identified in therapy to challenge anxious thoughts.  Status: New - Date: 12/23/20     Intervention(s)  Therapist will assign homework as appropriate  teach on topics relating to procrastination  Identifty and problem solve barriers to reaching goal.      Goal 2: Patient will develop a healthy sleep routine.    I will know I've met my goal when I can fall asleep consistently and I don't have a good reason to have disruptive sleep.  I would like to be able to fall asleep within ten minutes and be able to fall back asleep within ten minutes if I wake up in the middle of the night.      Objective #A Start to develop a sleep schedule    Status: New - Date: 12/23/20     Patient will identify healthy sleep hygiene practices.    Intervention(s)  Therapist will assign homework as appropriate  teach about sleep hygiene/routine.    Objective #B  Patient will TBD.    Status: TBD     Intervention(s)  Therapist will TBD.    Goal 3: Patient will \"learn skills to manage anxiety.\"    I will know I've met my goal when I feel like my level of anxiety is appropriate to the situations that I'm in.   Client would like to rate each day, \"1 (feeling that the anxiety he felt was appropriate) to 5 (where I'm at now and I'm constantly vigilante).\"   His goal is to have scores less than 3 for 4 out of 5 days at work for four consecutive weeks.    Objective #A Address the \" 'hypervigilence' I feel at work.  I like the energy it gives me but I don't think it's good for my body to constantly be in that state.    Status: New - Date: 12/23/20     Patient will identify triggering fears / thoughts that contribute to feeling anxious.    Intervention(s)  Therapist will assign homework as appropriate  teach about hypervigilence as a defense " mechanism  Identify and problem solve barriers to reaching goal.    Objective #B  Learn to relax at home.  Patient will use relaxation strategies 1 times per day to reduce the physical symptoms of anxiety.    Status: New - Date: 12/23/20     Intervention(s)  Therapist will assign homework as appropriate  teach relaxation skills  Problem solve around barriers.      Patient has not reviewed nor agreed to the above plan.      aFrhat Russell  February 15, 2021

## 2021-02-19 NOTE — PROGRESS NOTES
Assessment & Plan     Mild major depression (H)  DORY (generalized anxiety disorder)  Pt is feeling stable on his current dose of venlafaxine. He would like to stay on current dose.  Continue w/therapy  Self cares discussed   - venlafaxine (EFFEXOR-XR) 150 MG 24 hr capsule; Take 1 capsule (150 mg) by mouth daily       Follow Up: The patient was instructed to contact clinic for worsening symptoms, non-improvement as expected/discussed, and for questions regarding medications or treatment plan. Discussed parameters for follow up and included in After Visit Summary given to patient.      Return in about 6 months (around 8/22/2021).    Yamini Jernigan PA-C  [facility] GISELLA Aceves is a 25 year old male who presents to clinic today for the following health issues accompanied by his self    History of Present Illness       Mental Health Follow-up:  Patient presents to follow-up on Anxiety.    Patient's anxiety since last visit has been:  Better  The patient is having other symptoms associated with anxiety.  Any significant life events: No  Patient is not feeling anxious or having panic attacks.  Patient has no concerns about alcohol or drug use.     Social History  Tobacco Use    Smoking status: Never Smoker    Smokeless tobacco: Never Used  Alcohol use: No    Comment: none for past 8 months (as of 01/2019)  Drug use: No      Today's PHQ-9         PHQ-9 Total Score:     (P) 7   PHQ-9 Q9 Thoughts of better off dead/self-harm past 2 weeks :   (P) Not at all   Thoughts of suicide or self harm:      Self-harm Plan:        Self-harm Action:          Safety concerns for self or others:           He eats 2-3 servings of fruits and vegetables daily.He consumes 0 sweetened beverage(s) daily.He exercises with enough effort to increase his heart rate 9 or less minutes per day.  He exercises with enough effort to increase his heart rate 5 days per week.   He is taking medications regularly.    "  Mood and anxiety on the venlafaxine - \"better\".   Pretty good most days  No longer high anxiety days like I did before.  Sleep- inconsistent. Sometimes good and sometimes not. Getting up and to work consistently. \"A few min late at times but nothing too bad\"    Side effects- sometimes nausea and dizziness. Not daily. Not intolerable    Counseling about 1x per week. Helpful with insight.    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     No caffeine    Thinks meds are working well.     Taking programing class online. Has 1 summer class then will have 2 yr degree.     Review of Systems   Constitutional, HEENT, cardiovascular, pulmonary, gi and gu systems are negative, except as otherwise noted.      Objective    /70 (BP Location: Left arm, Patient Position: Sitting, Cuff Size: Adult Regular)   Pulse 90   Temp 98.7  F (37.1  C) (Temporal)   Resp 18   Ht 1.754 m (5' 9.06\")   Wt 72.7 kg (160 lb 4.8 oz)   SpO2 95%   BMI 23.63 kg/m    Body mass index is 23.63 kg/m .  Physical Exam   GENERAL: healthy, alert and no distress  EYES: Eyes grossly normal to inspection, PERRL and conjunctivae and sclerae normal  HENT: ear canals and TM's normal, nose and mouth without ulcers or lesions  NECK: no adenopathy, no asymmetry, masses, or scars and thyroid normal to palpation  RESP: lungs clear to auscultation - no rales, rhonchi or wheezes  CV: regular rate and rhythm, normal S1 S2, no S3 or S4, no murmur, click or rub, no peripheral edema and peripheral pulses strong  ABDOMEN: soft, nontender, no hepatosplenomegaly, no masses and bowel sounds normal  MS: no gross musculoskeletal defects noted, no edema  SKIN: no suspicious lesions or rashes  NEURO: Normal strength and tone, mentation intact and speech normal  PSYCH: mentation appears normal, affect normal somewhat flat          Answers for HPI/ROS submitted by the patient on 2/22/2021   Chronic problems general questions HPI " Form  If you checked off any problems, how difficult have these problems made it for you to do your work, take care of things at home, or get along with other people?: Somewhat difficult  PHQ9 TOTAL SCORE: 7  DORY 7 TOTAL SCORE: 8

## 2021-02-22 ENCOUNTER — OFFICE VISIT (OUTPATIENT)
Dept: FAMILY MEDICINE | Facility: CLINIC | Age: 26
End: 2021-02-22
Payer: COMMERCIAL

## 2021-02-22 ENCOUNTER — TELEPHONE (OUTPATIENT)
Dept: FAMILY MEDICINE | Facility: CLINIC | Age: 26
End: 2021-02-22

## 2021-02-22 VITALS
SYSTOLIC BLOOD PRESSURE: 128 MMHG | HEIGHT: 69 IN | RESPIRATION RATE: 18 BRPM | TEMPERATURE: 98.7 F | OXYGEN SATURATION: 95 % | WEIGHT: 160.3 LBS | BODY MASS INDEX: 23.74 KG/M2 | DIASTOLIC BLOOD PRESSURE: 70 MMHG | HEART RATE: 90 BPM

## 2021-02-22 DIAGNOSIS — F41.1 GAD (GENERALIZED ANXIETY DISORDER): ICD-10-CM

## 2021-02-22 DIAGNOSIS — F32.0 MILD MAJOR DEPRESSION (H): Primary | ICD-10-CM

## 2021-02-22 PROCEDURE — 99213 OFFICE O/P EST LOW 20 MIN: CPT | Performed by: PHYSICIAN ASSISTANT

## 2021-02-22 RX ORDER — VENLAFAXINE HYDROCHLORIDE 150 MG/1
150 CAPSULE, EXTENDED RELEASE ORAL DAILY
Qty: 90 CAPSULE | Refills: 1 | Status: SHIPPED | OUTPATIENT
Start: 2021-02-22 | End: 2021-09-16

## 2021-02-22 ASSESSMENT — ANXIETY QUESTIONNAIRES
7. FEELING AFRAID AS IF SOMETHING AWFUL MIGHT HAPPEN: NOT AT ALL
2. NOT BEING ABLE TO STOP OR CONTROL WORRYING: NOT AT ALL
6. BECOMING EASILY ANNOYED OR IRRITABLE: SEVERAL DAYS
GAD7 TOTAL SCORE: 8
GAD7 TOTAL SCORE: 8
1. FEELING NERVOUS, ANXIOUS, OR ON EDGE: NEARLY EVERY DAY
3. WORRYING TOO MUCH ABOUT DIFFERENT THINGS: NOT AT ALL
4. TROUBLE RELAXING: NEARLY EVERY DAY
5. BEING SO RESTLESS THAT IT IS HARD TO SIT STILL: SEVERAL DAYS
GAD7 TOTAL SCORE: 8
7. FEELING AFRAID AS IF SOMETHING AWFUL MIGHT HAPPEN: NOT AT ALL

## 2021-02-22 ASSESSMENT — PATIENT HEALTH QUESTIONNAIRE - PHQ9
SUM OF ALL RESPONSES TO PHQ QUESTIONS 1-9: 7
10. IF YOU CHECKED OFF ANY PROBLEMS, HOW DIFFICULT HAVE THESE PROBLEMS MADE IT FOR YOU TO DO YOUR WORK, TAKE CARE OF THINGS AT HOME, OR GET ALONG WITH OTHER PEOPLE: SOMEWHAT DIFFICULT
SUM OF ALL RESPONSES TO PHQ QUESTIONS 1-9: 7

## 2021-02-22 ASSESSMENT — MIFFLIN-ST. JEOR: SCORE: 1713.37

## 2021-02-22 NOTE — PATIENT INSTRUCTIONS
Refilled venlafaxine for 150mg - 90 caps with 1 refill  If the pharmacy only dispenses 30, it is an insurance limitation.   Call insurance- may need to be mail order or a specific pharmacy for 90 d fill.

## 2021-02-23 ASSESSMENT — ANXIETY QUESTIONNAIRES: GAD7 TOTAL SCORE: 8

## 2021-02-23 ASSESSMENT — PATIENT HEALTH QUESTIONNAIRE - PHQ9: SUM OF ALL RESPONSES TO PHQ QUESTIONS 1-9: 7

## 2021-02-23 NOTE — TELEPHONE ENCOUNTER
Passed message to Eliz Acevedo for updates on insurance. Eliz lucia insurance is current and valid

## 2021-02-23 NOTE — TELEPHONE ENCOUNTER
Patient rep left for the night. Please call patient and update his new insurance info into his chart.     Thank you  Laurie Bradley MA

## 2021-02-24 ENCOUNTER — VIRTUAL VISIT (OUTPATIENT)
Dept: PSYCHOLOGY | Facility: CLINIC | Age: 26
End: 2021-02-24
Payer: COMMERCIAL

## 2021-02-24 DIAGNOSIS — F33.41 MAJOR DEPRESSIVE DISORDER, RECURRENT EPISODE, IN PARTIAL REMISSION (H): Primary | ICD-10-CM

## 2021-02-24 PROCEDURE — 90834 PSYTX W PT 45 MINUTES: CPT | Mod: GT | Performed by: PSYCHOLOGIST

## 2021-02-24 NOTE — PROGRESS NOTES
"                                           Progress Note    Patient Name: Carrillo Aceves  Date: 2/15/21         Service Type: Individual      Session Start Time: 5:00pm Session End Time: 4:52pm     Session Length: 52 minutes     Session #: 10    Attendees: Client attended alone    Service Modality:  Video Visit:      Provider verified identity through the following two step process.  Patient provided:  Patient photo    Telemedicine Visit: The patient's condition can be safely assessed and treated via synchronous audio and visual telemedicine encounter.      Reason for Telemedicine Visit: Services only offered telehealth    Originating Site (Patient Location): Patient's home    Distant Site (Provider Location): Provider Remote Setting    Consent:  The patient/guardian has verbally consented to: the potential risks and benefits of telemedicine (video visit) versus in person care; bill my insurance or make self-payment for services provided; and responsibility for payment of non-covered services.     Patient would like the video invitation sent by: Send to e-mail at: [email or web address]    Mode of Communication:  Video Conference via Amwell    As the provider I attest to compliance with applicable laws and regulations related to telemedicine.     Treatment Plan Last Reviewed: Started collaborating on this session  PHQ-9 / DORY-7 : 14 / 10 (on 12/3/20); 12 / 9 (on 1/27/21)    DATA  Interactive Complexity: No  Crisis: No       Progress Since Last Session (Related to Symptoms / Goals / Homework):   Symptoms: Improving, \"I don't think I've had depression for awhile. My anxiety, especially around people, has gone down. \" Client has decided not to return to school until Fall '21.    Homework: Partially completed      Episode of Care Goals: Satisfactory progress - ACTION (Actively working towards change); Intervened by reinforcing change plan / affirming steps taken     Current / Ongoing Stressors and " "Concerns:   Work     Treatment Objective(s) Addressed in This Session:   use at least one coping skills for anxiety management in the next two weeks  Started differential diagnosis; discussed his experience with meditation and deep breathing exercises; Explored symptoms of ADHD     Notes for 2/24/21:  Checked in on sleep goal (improving: not returning to school until Fall '21). Client stated, when asked if he can relax, \"Whenever I'm alone in the house, at night or early in the morning, I like to go and sit and watch outside and drink some water and look outside; I think the fog lifted that I didn't know it was there.\" - likes the feeling of being alone, it's peaceful.  Pattern recognition regarding the thought of someone entering the room when he is looking outside (why he does not do this during the day): \"I don't want to have to interact with them right now.\"  He doesn't like \"that feeling being interrupted.\"  The feeling is a \"sense of freedom.  Like I don't have anything I'm trying to achieve or anything to do.  It's kind of like a cool breeze, refreshing, calming and relaxing (feeling) but I don't get drowsy. Kind of getting up on a cold winter morning, when everything's kind of quiet, when it snows.  I'm not thinking about anything in particular.  I kind of let my mind wander.  I'm not really focus on any physical sensations but more aware of them then during the daytime (like temperature, where my body is).\"    Maybe a subconscious resistance to deep breathing or meditation.  He believes meditation and deep breathing makes him more aware of his shallow breathing, which is always there, which constricts him more.  He feels tension in his jaw line, forehead, back of neck, and chest.  Problem solved relaxation (bathing in hot water).       Regarding people, \"It's not that I don't care about them, I just don't care to be  around them.\"    MMPI-2 acknowledgement       Intervention:   CBT: Psychoeducation; pattern " recognition; problem solved (relaxation technique - possibly taking a warm bath); explored triggers; explored history and patterns of anxiety; evaluated his practicing Deep Breathing and meditation; Assigned Homework  Verified receiving MMPI-2; various rapport building skills; open-ended and clarifying questions        ASSESSMENT: Current Emotional / Mental Status (status of significant symptoms):   Risk status (Self / Other harm or suicidal ideation)   Patient denies current fears or concerns for personal safety.   Patient denies current or recent suicidal ideation or behaviors.   Patient denies current or recent homicidal ideation or behaviors.   Patient denies current or recent self injurious behavior or ideation.   Patient denies other safety concerns.   Patient reports there has been no change in risk factors since their last session.     Patient reports there has been no change in protective factors since their last session.     Recommended that patient call 911 or go to the local ED should there be a change in any of these risk factors.     Appearance:   Appropriate    Eye Contact:   Good    Psychomotor Behavior: Normal    Attitude:   Cooperative    Orientation:   All   Speech    Rate / Production: Normal/ Responsive Normal     Volume:  Normal    Mood:    Euthymic   Affect:    Appropriate    Thought Content:  Clear    Thought Form:  Coherent  Logical    Insight:    Good  and Fair      Medication Review:   No changes to current psychiatric medication(s)     Medication Compliance:   Yes (He did miss one day)     Changes in Health Issues:   Yes: Client has had a headache since Sunday and a stiff neck     Chemical Use Review:   Substance Use: Chemical use reviewed, no active concerns identified      Tobacco Use: No current tobacco use.      Diagnosis:  1. Major depressive disorder, recurrent episode, in partial remission (H)        Collateral Reports Completed:   Not Applicable    PLAN: (Patient Tasks / Therapist  "Tasks / Other)  Client will try to take a warm bath as part of practice relaxation.  He will continue to practice meditation as well.  His next scheduled appointment is March 2, 2021.        Farhat Marroquin Drew                                                         ______________________________________________________________________    Treatment Plan    Patient's Name: Carrillo Aceves  YOB: 1995    Date: 12/16/20    DSM5 Diagnoses: 296.35 (F33.41)  Major Depressive Disorder, Recurrent Episode, In partial remission _ and With anxious distress  Psychosocial / Contextual Factors: School  WHODAS: 32 (on 12/3/20)    Referral / Collaboration:  Referral to another professional/service is not indicated at this time.    Anticipated number of session or this episode of care: 12      MeasurableTreatment Goal(s) related to diagnosis / functional impairment(s)  Goal 1: Patient will have control over his day-to-day schedule.  I want to establish a routine everyday.  I want to meditate every day and workout four times a week.  Falling asleep can be an issue too.  I want to work on procrastination I have.\"    I will know I've met my goal when I not only have a consistent daily routine but also being able to follow through on plans that I make when there isn't a good reason for me not to follow through.      Objective #A Client will workout four times a week    Patient will exercise for 30 minutes 4 times per week for 45 minutes.  Status: New - Date: 12/23/20     Intervention(s)  Therapist will assign homework as appropriate  provide encourage and accountability to work out.  Problem solve when necessary.    Objective #B  Client will meditate every day  Patient will use relaxation strategies one times per day to reduce the physical symptoms of anxiety.  Status: New - Date: 12/23/20     Intervention(s)  Therapist will assign homework as appropriate  problem solve as necessary.    Objective #C  \"I want to work on " "procrastination when necessary\"  Patient will use cognitive strategies identified in therapy to challenge anxious thoughts.  Status: New - Date: 12/23/20     Intervention(s)  Therapist will assign homework as appropriate  teach on topics relating to procrastination  Identifty and problem solve barriers to reaching goal.      Goal 2: Patient will develop a healthy sleep routine.    I will know I've met my goal when I can fall asleep consistently and I don't have a good reason to have disruptive sleep.  I would like to be able to fall asleep within ten minutes and be able to fall back asleep within ten minutes if I wake up in the middle of the night.      Objective #A Start to develop a sleep schedule    Status: New - Date: 12/23/20     Patient will identify healthy sleep hygiene practices.    Intervention(s)  Therapist will assign homework as appropriate  teach about sleep hygiene/routine.    Objective #B  Patient will TBD.    Status: TBD     Intervention(s)  Therapist will TBD.    Goal 3: Patient will \"learn skills to manage anxiety.\"    I will know I've met my goal when I feel like my level of anxiety is appropriate to the situations that I'm in.   Client would like to rate each day, \"1 (feeling that the anxiety he felt was appropriate) to 5 (where I'm at now and I'm constantly vigilante).\"   His goal is to have scores less than 3 for 4 out of 5 days at work for four consecutive weeks.    Objective #A Address the \" 'hypervigilence' I feel at work.  I like the energy it gives me but I don't think it's good for my body to constantly be in that state.    Status: New - Date: 12/23/20     Patient will identify triggering fears / thoughts that contribute to feeling anxious.    Intervention(s)  Therapist will assign homework as appropriate  teach about hypervigilence as a defense mechanism  Identify and problem solve barriers to reaching goal.    Objective #B  Learn to relax at home.  Patient will use relaxation " strategies 1 times per day to reduce the physical symptoms of anxiety.    Status: New - Date: 12/23/20     Intervention(s)  Therapist will assign homework as appropriate  teach relaxation skills  Problem solve around barriers.      Patient has not reviewed nor agreed to the above plan.      Farhat Russell  February 24, 2021

## 2021-03-02 ENCOUNTER — DOCUMENTATION ONLY (OUTPATIENT)
Dept: PSYCHOLOGY | Facility: CLINIC | Age: 26
End: 2021-03-02
Payer: COMMERCIAL

## 2021-03-02 ENCOUNTER — VIRTUAL VISIT (OUTPATIENT)
Dept: PSYCHOLOGY | Facility: CLINIC | Age: 26
End: 2021-03-02
Payer: COMMERCIAL

## 2021-03-02 DIAGNOSIS — F33.41 MAJOR DEPRESSIVE DISORDER, RECURRENT EPISODE, IN PARTIAL REMISSION (H): Primary | ICD-10-CM

## 2021-03-02 PROCEDURE — 90834 PSYTX W PT 45 MINUTES: CPT | Mod: GT | Performed by: PSYCHOLOGIST

## 2021-03-02 NOTE — PROGRESS NOTES
"Spent a couple years, after hospitalization, thinking I was depressed all the time  Family of four (sister and parents, sister 1 1/2 yrs older), two cats and a dog, people in the house, all quiet people.  My father is a very generous and hard working but not very emotionally aware of his own emotions.  My mother is a caregiver, always putting others over herself.  My sister has always had a hard time fitting in and was diagnosed with schizotypal.  My mom always took us to the pool or the library.  I don't remember asking to go anywhere but going along.  We all had our own rooms in our house.  We spent a lot of time watching movies together.  My parents  when I was in ninth grade.  They never raised their voices.  I think it was financial (contributing to the divorce, 2008 depression).  Mother is stoic but extremely sensitive to how others feel about her.    Elementary school I didn't have any trouble getting along with people.  Other kids seemed to like me.  I was never worried about if someone liked me or not.  I wasn't that close to anybody, I don't think.  I never felt the need to see anybody outside of school.  I never fought with anybody.    Ever feel rejected from others?  \"No.\"  I never felt I was personally rejected.  If a kid wasn't paying attention to me, I was like, 'oh, ok (like no big deal).'  There may have been a few times in middle or high school when I was trying to get someone's attention to borrow a pencil and it was kind of annoying.  In middle school, I didn't know what to do with it, or navigate that situation, it was kind of like, 'oh, ok.'  I didn't know how to make myself heard outside of school.  I didn't know how to demand people's attention.  I would say something to someone and they wouldn't respond.\"  Did you feel ignored, \"Sort of but not really rejected.  I'd just move on.\"  How were emotions expressed at home, \"My father was normally in a good mood.  Neither my parents " "expressed much depth for emotions.\"  It wasn't common but when he did try to strike up a conversation, and the other didn't respond, he had a hard time being assertive. I didn't get hurt I just thought that's how people were, they weren't paying attention to me.    \"I still have a hard time being assertive.  Sometimes, if I feel angry, it seems I can't express it.  Not a lot of emotional expression.\"  Parents were expressive about certain things, not grieving, disappointment, and like that but they were expressive with positive emotions.\"    Client is able to express positive emotions \"with animals.\"   Why not people?  \"I guess I can express positive emotions toward other people but I can't express intimacy or affection toward other people.  I can smile and laugh.  I guess it's the same thing with animals.  It's kind of like they, um, maybe it means more to animals.  Like, their always happy to see you.  With people, it more like social politeness.  It's more common for people to express positive emotions but not interpret that as closeness.\"    \"It might be projection on my part, but if someone's happy to see me, it may not be closeness.\" (Projection is, just because I'm interacting positively with someone, doesn't mean your close.\").    Do you believe not getting close to others serves a purpose?  \"Fear of rejection, \"no.\"  I will say I don't like being vulnerable to people.  When I'm feeling I needed help.....the last thing I want to do is reach out to people to help from.  It's not about being judged by them I just don't want to be in that situation.  I've always hated it when people pity me or are patronizing.  My mom is the type of person who is very generous with praise (she works with children with mental disabilities).  Some things (she praised) I didn't feel were worth praising.    There were a lot of things we talked about today that we haven't thought about for along time, so, I appreciate that.  It " "seems helpful but I can't put my finger on how yet.    \"I tended to not draw a lot of attention.\"  "

## 2021-03-02 NOTE — PROGRESS NOTES
"                                           Progress Note    Patient Name: Carrillo Aceves  Date: 3/2/21         Service Type: Individual      Session Start Time: 7:00am Session End Time: 7:52am     Session Length: 52 minutes     Session #: 11    Attendees: Client attended alone    Service Modality:  Video Visit:      Provider verified identity through the following two step process.  Patient provided:  Patient photo    Telemedicine Visit: The patient's condition can be safely assessed and treated via synchronous audio and visual telemedicine encounter.      Reason for Telemedicine Visit: Services only offered telehealth    Originating Site (Patient Location): Patient's home    Distant Site (Provider Location): Provider Remote Setting    Consent:  The patient/guardian has verbally consented to: the potential risks and benefits of telemedicine (video visit) versus in person care; bill my insurance or make self-payment for services provided; and responsibility for payment of non-covered services.     Patient would like the video invitation sent by: Send to e-mail at: [email or web address]    Mode of Communication:  Video Conference via [employer]    As the provider I attest to compliance with applicable laws and regulations related to telemedicine.     Treatment Plan Last Reviewed: Started collaborating on this session  PHQ-9 / DORY-7 : 14 / 10 (on 12/3/20); 12 / 9 (on 1/27/21)    DATA  Interactive Complexity: No  Crisis: No       Progress Since Last Session (Related to Symptoms / Goals / Homework):   Symptoms: Stable: \"I'm alright.\"    Homework: Partially completed      Episode of Care Goals: Satisfactory progress - ACTION (Actively working towards change); Intervened by reinforcing change plan / affirming steps taken     Current / Ongoing Stressors and Concerns:   Work; school     Treatment Objective(s) Addressed in This Session:   Update on his current condition; brief discussion on his study pattern (procrastinating " studying for tests); Reviewed preliminary results of MMPI-2; base on preliminary results, had a in-depth discussion on client's social and emotional history and connection with others      Intervention:   CBT: Psychoeducation; pattern recognition; explored social and emotional history and connects (more pattern recognition); Assigned Homework  Reviewed preliminary results of MMPI-2; various rapport building skills; open-ended and clarifying questions; active listening        ASSESSMENT: Current Emotional / Mental Status (status of significant symptoms):   Risk status (Self / Other harm or suicidal ideation)   Patient denies current fears or concerns for personal safety.   Patient denies current or recent suicidal ideation or behaviors.   Patient denies current or recent homicidal ideation or behaviors.   Patient denies current or recent self injurious behavior or ideation.   Patient denies other safety concerns.   Patient reports there has been no change in risk factors since their last session.     Patient reports there has been no change in protective factors since their last session.     Recommended that patient call 911 or go to the local ED should there be a change in any of these risk factors.     Appearance:   Appropriate    Eye Contact:   Good    Psychomotor Behavior: Normal    Attitude:   Cooperative    Orientation:   All   Speech    Rate / Production: Normal/ Responsive Normal     Volume:  Normal    Mood:    Euthymic   Affect:    Appropriate    Thought Content:  Clear    Thought Form:  Coherent  Logical    Insight:    Good  and Fair      Medication Review:   No changes to current psychiatric medication(s)     Medication Compliance:   Yes (He did miss one day)     Changes in Health Issues:   Yes: Client has had a headache since Sunday and a stiff neck     Chemical Use Review:   Substance Use: Chemical use reviewed, no active concerns identified      Tobacco Use: No current tobacco use.      Diagnosis:  1.  "Major depressive disorder, recurrent episode, in partial remission (H)        Collateral Reports Completed:   Not Applicable    PLAN: (Patient Tasks / Therapist Tasks / Other)  Client will take time to reflect on today's discussion in which he said, \"It was helpful to talk about but I can't quite put my finger on how yet.\"  He will discuss his thoughts next session.  Feedback on the results of his MMPI-2 will be presented next session.  His next scheduled appointment is March 10, 2021.        Farhat Marroquin Drew                                                         ______________________________________________________________________    Treatment Plan    Patient's Name: Carrillo Aceves  YOB: 1995    Date: 12/16/20    DSM5 Diagnoses: 296.35 (F33.41)  Major Depressive Disorder, Recurrent Episode, In partial remission _ and With anxious distress  Psychosocial / Contextual Factors: School  WHODAS: 32 (on 12/3/20)    Referral / Collaboration:  Referral to another professional/service is not indicated at this time.    Anticipated number of session or this episode of care: 12      MeasurableTreatment Goal(s) related to diagnosis / functional impairment(s)  Goal 1: Patient will have control over his day-to-day schedule.  I want to establish a routine everyday.  I want to meditate every day and workout four times a week.  Falling asleep can be an issue too.  I want to work on procrastination I have.\"    I will know I've met my goal when I not only have a consistent daily routine but also being able to follow through on plans that I make when there isn't a good reason for me not to follow through.      Objective #A Client will workout four times a week    Patient will exercise for 30 minutes 4 times per week for 45 minutes.  Status: New - Date: 12/23/20     Intervention(s)  Therapist will assign homework as appropriate  provide encourage and accountability to work out.  Problem solve when necessary.    Objective " "#B  Client will meditate every day  Patient will use relaxation strategies one times per day to reduce the physical symptoms of anxiety.  Status: New - Date: 12/23/20     Intervention(s)  Therapist will assign homework as appropriate  problem solve as necessary.    Objective #C  \"I want to work on procrastination when necessary\"  Patient will use cognitive strategies identified in therapy to challenge anxious thoughts.  Status: New - Date: 12/23/20     Intervention(s)  Therapist will assign homework as appropriate  teach on topics relating to procrastination  Identifty and problem solve barriers to reaching goal.      Goal 2: Patient will develop a healthy sleep routine.    I will know I've met my goal when I can fall asleep consistently and I don't have a good reason to have disruptive sleep.  I would like to be able to fall asleep within ten minutes and be able to fall back asleep within ten minutes if I wake up in the middle of the night.      Objective #A Start to develop a sleep schedule    Status: New - Date: 12/23/20     Patient will identify healthy sleep hygiene practices.    Intervention(s)  Therapist will assign homework as appropriate  teach about sleep hygiene/routine.    Objective #B  Patient will TBD.    Status: TBD     Intervention(s)  Therapist will TBD.    Goal 3: Patient will \"learn skills to manage anxiety.\"    I will know I've met my goal when I feel like my level of anxiety is appropriate to the situations that I'm in.   Client would like to rate each day, \"1 (feeling that the anxiety he felt was appropriate) to 5 (where I'm at now and I'm constantly vigilante).\"   His goal is to have scores less than 3 for 4 out of 5 days at work for four consecutive weeks.    Objective #A Address the \" 'hypervigilence' I feel at work.  I like the energy it gives me but I don't think it's good for my body to constantly be in that state.    Status: New - Date: 12/23/20     Patient will identify triggering fears " / thoughts that contribute to feeling anxious.    Intervention(s)  Therapist will assign homework as appropriate  teach about hypervigilence as a defense mechanism  Identify and problem solve barriers to reaching goal.    Objective #B  Learn to relax at home.  Patient will use relaxation strategies 1 times per day to reduce the physical symptoms of anxiety.    Status: New - Date: 12/23/20     Intervention(s)  Therapist will assign homework as appropriate  teach relaxation skills  Problem solve around barriers.      Patient has not reviewed nor agreed to the above plan.      Farhat Russell  March 2, 2021

## 2021-03-10 ENCOUNTER — VIRTUAL VISIT (OUTPATIENT)
Dept: PSYCHOLOGY | Facility: CLINIC | Age: 26
End: 2021-03-10
Payer: COMMERCIAL

## 2021-03-10 DIAGNOSIS — F33.41 MAJOR DEPRESSIVE DISORDER, RECURRENT EPISODE, IN PARTIAL REMISSION (H): Primary | ICD-10-CM

## 2021-03-10 PROCEDURE — 90834 PSYTX W PT 45 MINUTES: CPT | Mod: GT | Performed by: PSYCHOLOGIST

## 2021-03-10 NOTE — PROGRESS NOTES
"                                           Progress Note    Patient Name: Carrillo Aceves  Date: 3/2/21         Service Type: Individual      Session Start Time: 5:03pm Session End Time: 5:55pm     Session Length: 52 minutes     Session #: 12    Attendees: Client attended alone    Service Modality:  Video Visit:      Provider verified identity through the following two step process.  Patient provided:  Patient photo    Telemedicine Visit: The patient's condition can be safely assessed and treated via synchronous audio and visual telemedicine encounter.      Reason for Telemedicine Visit: Services only offered telehealth    Originating Site (Patient Location): Patient's home    Distant Site (Provider Location): Provider Remote Setting    Consent:  The patient/guardian has verbally consented to: the potential risks and benefits of telemedicine (video visit) versus in person care; bill my insurance or make self-payment for services provided; and responsibility for payment of non-covered services.     Patient would like the video invitation sent by: Send to e-mail at: [email or web address]    Mode of Communication:  Video Conference via [employer]well    As the provider I attest to compliance with applicable laws and regulations related to telemedicine.     Treatment Plan Last Reviewed: Started collaborating on this session  PHQ-9 / DORY-7 : 14 / 10 (on 12/3/20); 12 / 9 (on 1/27/21)    DATA  Interactive Complexity: No  Crisis: No       Progress Since Last Session (Related to Symptoms / Goals / Homework):   Symptoms: Stable: \"I'm ok.\"    Homework: Partially completed      Episode of Care Goals: Satisfactory progress - ACTION (Actively working towards change); Intervened by reinforcing change plan / affirming steps taken     Current / Ongoing Stressors and Concerns:   Work; school      Treatment Objective(s) Addressed in This Session:   Review results of MMPI-2; Discussed, reviewed, and processed client's difficulty sleeping due to " "stress at work       Notes from the 3/10/21 Session:  Client has felt work is stressful and he woke up three times last night and looked at the time (he has a sunlight). He had a feeling in his chest (tightness) that he associates with stress.  He did fall asleep again \"but I don't think I fell back into a deep sleep.\"  He had a lot of work with deadlines.  Conversation with the discomfort in his chest.  \"Get in touch with his emotions\" in his chest by laying in his bed, shifting his attention to it, looking at it for awhile.    Client gets \"fearful\" when it comes to directing his direct report at work.  He fears 'confronting' them \"like confronting someone who cuts in line.\"  Everyone at work is pretty good natured.  Elicited his automatic thoughts and had him end it with \"because...\"  \"Sometimes it's because I'm tired and it take a lot of energy to talk to someone else.  For the most part, because it makes me feel bad right here (puts hand on chest.  It's different than the stress feeling talked about early.  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  \"I think I have a tendency to look down on people when they don't work as hard as I do.  Especially if I'm under a lot of pressure I can get competitive with how much work I do.  I tend to want to tell people to work faster but I know telling them to won't change their behavior.\"  Strong emotions almost \"automatically shuts me down.\"  Stress feels different than anger or irritation (like people whispering in the library when I'm trying to do work.  It can lead to extreme anger and outwardly shutting down externally.  This is combined with the fear of confronting them.   \"I think my fear of " "people or confrontation is linked to like combat sports or weight lifting to feel I'm strong; to get that feeling of taking care of myself.  If you can experience getting hit it can help that fear of getting hit (in public). It doesn't have to be the end of the world.\" - Raising self-esteem.  \"I'm trying right now is restrict my internet usage to weekends only (recreational use only).  I'm not sure how's its gone yet.\" the goal is to achieve more of my goals that we laid out (meditate more, clean and do maintenance stuff).  Goals butt heads often.       Intervention:   CBT: Psychoeducation; pattern recognition; explored social and emotional connection; Socratic Questioning; Skill Identification (DBTs Opposite to Emotion); Emotion identification; Assigned Homework  Reviewed results of MMPI-2; various rapport building skills; open-ended and clarifying questions; active listening; helped identify emotions; taught \"Having a conversation with your emotions.\"        ASSESSMENT: Current Emotional / Mental Status (status of significant symptoms):   Risk status (Self / Other harm or suicidal ideation)   Patient denies current fears or concerns for personal safety.   Patient denies current or recent suicidal ideation or behaviors.   Patient denies current or recent homicidal ideation or behaviors.   Patient denies current or recent self injurious behavior or ideation.   Patient denies other safety concerns.   Patient reports there has been no change in risk factors since their last session.     Patient reports there has been no change in protective factors since their last session.     Recommended that patient call 911 or go to the local ED should there be a change in any of these risk factors.     Appearance:   Appropriate    Eye Contact:   Good    Psychomotor Behavior: Normal    Attitude:   Cooperative    Orientation:   All   Speech    Rate / Production: Normal/ Responsive Normal     Volume:  Normal " "   Mood:    Euthymic   Affect:    Appropriate    Thought Content:  Clear    Thought Form:  Coherent  Logical    Insight:    Good  and Fair      Medication Review:   No changes to current psychiatric medication(s)     Medication Compliance:   Yes     Changes in Health Issues:   Yes: Client has had a headache since Sunday and a stiff neck     Chemical Use Review:   Substance Use: Chemical use reviewed, no active concerns identified      Tobacco Use: No current tobacco use.      Diagnosis:  1. Major depressive disorder, recurrent episode, in partial remission (H)        Collateral Reports Completed:   Not Applicable    PLAN: (Patient Tasks / Therapist Tasks / Other)  Client will take time tonight to evaluate the stress/\"tightness\" in his chest and identify the emotion underlying the feeling.  Feedback on the results of his MMPI-2 was that the profile was invalid; therefore, the client decided to retake the MMPI-2.  His next scheduled appointment is March 17, 2021.        Farhat Russell                                                         ______________________________________________________________________    Treatment Plan    Patient's Name: Carrillo Aceves  YOB: 1995    Date: 12/16/20    DSM5 Diagnoses: 296.35 (F33.41)  Major Depressive Disorder, Recurrent Episode, In partial remission _ and With anxious distress  Psychosocial / Contextual Factors: School  WHODAS: 32 (on 12/3/20)    Referral / Collaboration:  Referral to another professional/service is not indicated at this time.    Anticipated number of session or this episode of care: 12      MeasurableTreatment Goal(s) related to diagnosis / functional impairment(s)  Goal 1: Patient will have control over his day-to-day schedule.  I want to establish a routine everyday.  I want to meditate every day and workout four times a week.  Falling asleep can be an issue too.  I want to work on procrastination I have.\"    I will know I've met my goal " "when I not only have a consistent daily routine but also being able to follow through on plans that I make when there isn't a good reason for me not to follow through.      Objective #A Client will workout four times a week    Patient will exercise for 30 minutes 4 times per week for 45 minutes.  Status: New - Date: 12/23/20     Intervention(s)  Therapist will assign homework as appropriate  provide encourage and accountability to work out.  Problem solve when necessary.    Objective #B  Client will meditate every day  Patient will use relaxation strategies one times per day to reduce the physical symptoms of anxiety.  Status: New - Date: 12/23/20     Intervention(s)  Therapist will assign homework as appropriate  problem solve as necessary.    Objective #C  \"I want to work on procrastination when necessary\"  Patient will use cognitive strategies identified in therapy to challenge anxious thoughts.  Status: New - Date: 12/23/20     Intervention(s)  Therapist will assign homework as appropriate  teach on topics relating to procrastination  Identifty and problem solve barriers to reaching goal.      Goal 2: Patient will develop a healthy sleep routine.    I will know I've met my goal when I can fall asleep consistently and I don't have a good reason to have disruptive sleep.  I would like to be able to fall asleep within ten minutes and be able to fall back asleep within ten minutes if I wake up in the middle of the night.      Objective #A Start to develop a sleep schedule    Status: New - Date: 12/23/20     Patient will identify healthy sleep hygiene practices.    Intervention(s)  Therapist will assign homework as appropriate  teach about sleep hygiene/routine.    Objective #B  Patient will TBD.    Status: TBD     Intervention(s)  Therapist will TBD.    Goal 3: Patient will \"learn skills to manage anxiety.\"    I will know I've met my goal when I feel like my level of anxiety is appropriate to the situations that I'm " "in.   Client would like to rate each day, \"1 (feeling that the anxiety he felt was appropriate) to 5 (where I'm at now and I'm constantly vigilante).\"   His goal is to have scores less than 3 for 4 out of 5 days at work for four consecutive weeks.    Objective #A Address the \" 'hypervigilence' I feel at work.  I like the energy it gives me but I don't think it's good for my body to constantly be in that state.    Status: New - Date: 12/23/20     Patient will identify triggering fears / thoughts that contribute to feeling anxious.    Intervention(s)  Therapist will assign homework as appropriate  teach about hypervigilence as a defense mechanism  Identify and problem solve barriers to reaching goal.    Objective #B  Learn to relax at home.  Patient will use relaxation strategies 1 times per day to reduce the physical symptoms of anxiety.    Status: New - Date: 12/23/20     Intervention(s)  Therapist will assign homework as appropriate  teach relaxation skills  Problem solve around barriers.      Patient has not reviewed nor agreed to the above plan.      Farhat Russell  March 2, 2021  "

## 2021-03-17 ENCOUNTER — VIRTUAL VISIT (OUTPATIENT)
Dept: PSYCHOLOGY | Facility: CLINIC | Age: 26
End: 2021-03-17
Payer: COMMERCIAL

## 2021-03-17 DIAGNOSIS — F33.41 MAJOR DEPRESSIVE DISORDER, RECURRENT EPISODE, IN PARTIAL REMISSION (H): Primary | ICD-10-CM

## 2021-03-17 PROCEDURE — 90837 PSYTX W PT 60 MINUTES: CPT | Mod: GT | Performed by: PSYCHOLOGIST
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
## 2021-03-24 ENCOUNTER — VIRTUAL VISIT (OUTPATIENT)
Dept: PSYCHOLOGY | Facility: CLINIC | Age: 26
End: 2021-03-24
Payer: COMMERCIAL

## 2021-03-24 DIAGNOSIS — F33.41 MAJOR DEPRESSIVE DISORDER, RECURRENT EPISODE, IN PARTIAL REMISSION (H): Primary | ICD-10-CM

## 2021-03-24 PROCEDURE — 90834 PSYTX W PT 45 MINUTES: CPT | Mod: GT | Performed by: PSYCHOLOGIST
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
## 2021-03-31 ENCOUNTER — VIRTUAL VISIT (OUTPATIENT)
Dept: PSYCHOLOGY | Facility: CLINIC | Age: 26
End: 2021-03-31
Payer: COMMERCIAL

## 2021-03-31 DIAGNOSIS — F33.41 MAJOR DEPRESSIVE DISORDER, RECURRENT EPISODE, IN PARTIAL REMISSION (H): Primary | ICD-10-CM

## 2021-03-31 PROCEDURE — 96131 PSYCL TST EVAL PHYS/QHP EA: CPT | Mod: GT | Performed by: PSYCHOLOGIST

## 2021-03-31 PROCEDURE — 96130 PSYCL TST EVAL PHYS/QHP 1ST: CPT | Mod: GT | Performed by: PSYCHOLOGIST

## 2021-03-31 PROCEDURE — 90834 PSYTX W PT 45 MINUTES: CPT | Mod: GT | Performed by: PSYCHOLOGIST

## 2021-03-31 NOTE — PROGRESS NOTES
"                                           Progress Note    Patient Name: Carrillo Aceves  Date: 3/31/21    Psychological Testing  Billing/Services Summary     Integration/Report Generation (MMPI-2 Interpretation)  (Start/Stop), (Date, Day #)  8:12pm/9:03pm                3/22/21 / Day 1           51 minutes  3:30pm/4:45pm                3/24/21 / Day 2           75 minutes    Total Time:     126 Minutes (2 hours, 6 minutes)     Total Units:                1 (30087)         1 (95305)         Service Type: Individual      Session Start Time: 5:01pm Session End Time: 5:53pm     Session Length: 52 minutes     Session #: 15    Attendees: Client attended alone    Service Modality:  Video Visit:      Provider verified identity through the following two step process.  Patient provided:  Patient photo    Telemedicine Visit: The patient's condition can be safely assessed and treated via synchronous audio and visual telemedicine encounter.      Reason for Telemedicine Visit: Services only offered telehealth    Originating Site (Patient Location): Patient's home    Distant Site (Provider Location): Provider Remote Setting    Consent:  The patient/guardian has verbally consented to: the potential risks and benefits of telemedicine (video visit) versus in person care; bill my insurance or make self-payment for services provided; and responsibility for payment of non-covered services.     Patient would like the video invitation sent by: Send to e-mail at: [email or web address]    Mode of Communication:  Video Conference via well    As the provider I attest to compliance with applicable laws and regulations related to telemedicine.     Treatment Plan Last Reviewed: Started collaborating on this session  PHQ-9 / DORY-7 : 14 / 10 (on 12/3/20); 12 / 9 (on 1/27/21)    DATA  Interactive Complexity: No  Crisis: No       Progress Since Last Session (Related to Symptoms / Goals / Homework):   Symptoms: Stable: \" I'm good. \"    Homework: " "Partially completed      Episode of Care Goals: Minimal progress - PREPARATION (Decided to change - considering how); Intervened by negotiating a change plan and determining options / strategies for behavior change, identifying triggers, exploring social supports, and working towards setting a date to begin behavior change     Current / Ongoing Stressors and Concerns:   Work; school      Treatment Objective(s) Addressed in This Session:   Review and assign homework; review results of the MMPI-2 and answer questions       Notes from 3/24/21 Session:  Reviewed homework, no progress \"still waiting for my new mattress\"; 7-9 hours in bed.  Go to bed around 11:00pm each night, no routine, does not meditate before bed, \"I'm too tired.\"     Intervention:   CBT: Psychoeducation; pattern recognition; Assigned and reviewed Homework  Reviewed and processed recently completed MMPI-2; active listening and other rapport building skills    [place] Multi-phasic Personality Disorder-2 (MMPI-2)   The validity scale profile indicates someone who answer in a consistent open manner.  However, it may also indicate someone whose coping abilities are compromised and their defenses immature ( sounds pretty accurate to me ).  Some scales indicate someone in psychological distress with an unusual or markedly unconventional thinking and attitude.  They may be described as jiménez, changeable ( my behaviors but not my personal beliefs morals ), restless, , dissatisfied, , and opinionated ( I don t tend to want to talk about my beliefs even if I do have strong beliefs on something ).  They report a general disturbance or distress.  Young people struggle with identity problems (yes) and a need to be defined through non-conformity ( not really ).  This may also indicate someone with a long-standing and/or serious disturbance.  The validity scales indicate that this is a valid profile and can be interpreted with some caution.    The Clinical Scale " "profile indicates someone who may have acute psychological turmoil and is seeking help.  Than symptoms are more neurotic than psychotic.  They are filled with worry, anxiety, tension, and excessive rumination.  They engage in excessive introspection and over-ideation.  They may appear confused and highly emotional (He may feel it but does not believe he shows it outwardly).  People with similar profiles show a decrease in thinking and concentration, insomnia is not uncommon, typically caused by ruminations (possibly no to ruminations. More like a dis. from thoughts or emotions I m feeling.  More can t sleep due to general stress.).  Some are preoccupied with health issues and multiple somatic complaints.  Somatic (physical) complaints typically reflect tension and anxiety.  They lack confidence, are shy, and feel inadequate (client does not agree) and interpersonally sensitive, particularly in heterosexual relationships.  The lack of confidence can lead to , leaving them feeling , overcontrolled, and guilty. Underlying feelings of guilt, inferiority, pessimism (client does not agree), and difficulty with assertiveness (except at work when I m already under a lot of stress) are typical.  Extreme intellectualizing is common.  They may have poor sexual functioning due to excessive anxiety (Had a relationship for two months but did not feel there was a lot of anxiety).  They are described by others as anxious, obsessive-compulsive, and schizoid.  Depersonalization and isolation from affect.  Their symptoms can lead them to believe they are \"losing their mind and/or health (not that I m losing it in the present tense, just I ve difficulty with how my mind works for along time).\"  They have a profound negative image, quick to feel criticized, judged, somehow defective and the fear that others will discover them can also be present ( I would like to talk about this one more in our next session).  Many have strong home " "conflicts centering on assertiveness or issues of independence-dependence; particularly with their mothers.  They avoid group and social situations, have an aversion for interpersonal interaction, and prefer to be alone.  There is a strong fear of embarrassment ( in middle-school in some situations. I would say more often would be I would say or do something and I wouldn t like it and I would criticize myself for it. ) that pervades interactions with others, especially strangers and group situations.  Their lack of social skills can come off as standoffish or reticent.  They have an emphasis that others can be deceitful, selfish, untrustworthy, manipulative, unsympathetic, and disloyal.  They can be \"burned out\" on relationships (client does not agree,  I ve only had one romantic relationship and I don t feel jaded from it or even in general ).        ASSESSMENT: Current Emotional / Mental Status (status of significant symptoms):   Risk status (Self / Other harm or suicidal ideation)   Patient denies current fears or concerns for personal safety.   Patient denies current or recent suicidal ideation or behaviors.   Patient denies current or recent homicidal ideation or behaviors.   Patient denies current or recent self injurious behavior or ideation.   Patient denies other safety concerns.   Patient reports there has been no change in risk factors since their last session.     Patient reports there has been no change in protective factors since their last session.     Recommended that patient call 911 or go to the local ED should there be a change in any of these risk factors.     Appearance:   Appropriate    Eye Contact:   Good    Psychomotor Behavior: Normal    Attitude:   Cooperative    Orientation:   All   Speech    Rate / Production: Normal/ Responsive Normal     Volume:  Normal    Mood:    Euthymic   Affect:    Appropriate    Thought Content:  Clear    Thought Form:  Coherent  Logical    Insight:    Good  and " "Fair      Medication Review:   No changes to current psychiatric medication(s)     Medication Compliance:   Yes     Changes in Health Issues:   Yes: Client has had a headache since Sunday and a stiff neck     Chemical Use Review:   Substance Use: Chemical use reviewed, no active concerns identified      Tobacco Use: No current tobacco use.      Diagnosis:  1. Major depressive disorder, recurrent episode, in partial remission (H)        Collateral Reports Completed:   Not Applicable    PLAN: (Patient Tasks / Therapist Tasks / Other)  Client will come ready to discuss a point from the MMPI-2: \"somehow defective and the fear that others will discover them.\"  He would also like to discuss in more detail how to \"keep on top of things and not lose time.\"  His next scheduled appointment is April 7, 2021.        Farhat Russell                                                         ______________________________________________________________________    Treatment Plan    Patient's Name: Carrillo Aceves  YOB: 1995    Date: 12/16/20    DSM5 Diagnoses: 296.35 (F33.41)  Major Depressive Disorder, Recurrent Episode, In partial remission _ and With anxious distress  Psychosocial / Contextual Factors: School  WHODAS: 32 (on 12/3/20)    Referral / Collaboration:  Referral to another professional/service is not indicated at this time.    Anticipated number of session or this episode of care: 12      MeasurableTreatment Goal(s) related to diagnosis / functional impairment(s)  Goal 1: Patient will have control over his day-to-day schedule.  I want to establish a routine everyday.  I want to meditate every day and workout four times a week.  Falling asleep can be an issue too.  I want to work on procrastination I have.\"    I will know I've met my goal when I not only have a consistent daily routine but also being able to follow through on plans that I make when there isn't a good reason for me not to follow through.  " "    Objective #A Client will workout four times a week    Patient will exercise for 30 minutes 4 times per week for 45 minutes.  Status: New - Date: 12/23/20     Intervention(s)  Therapist will assign homework as appropriate  provide encourage and accountability to work out.  Problem solve when necessary.    Objective #B  Client will meditate every day  Patient will use relaxation strategies one times per day to reduce the physical symptoms of anxiety.  Status: New - Date: 12/23/20     Intervention(s)  Therapist will assign homework as appropriate  problem solve as necessary.    Objective #C  \"I want to work on procrastination when necessary\"  Patient will use cognitive strategies identified in therapy to challenge anxious thoughts.  Status: New - Date: 12/23/20     Intervention(s)  Therapist will assign homework as appropriate  teach on topics relating to procrastination  Identifty and problem solve barriers to reaching goal.      Goal 2: Patient will develop a healthy sleep routine.    I will know I've met my goal when I can fall asleep consistently and I don't have a good reason to have disruptive sleep.  I would like to be able to fall asleep within ten minutes and be able to fall back asleep within ten minutes if I wake up in the middle of the night.      Objective #A Start to develop a sleep schedule    Status: New - Date: 12/23/20     Patient will identify healthy sleep hygiene practices.    Intervention(s)  Therapist will assign homework as appropriate  teach about sleep hygiene/routine.    Objective #B  Patient will TBD.    Status: TBD     Intervention(s)  Therapist will TBD.    Goal 3: Patient will \"learn skills to manage anxiety.\"    I will know I've met my goal when I feel like my level of anxiety is appropriate to the situations that I'm in.   Client would like to rate each day, \"1 (feeling that the anxiety he felt was appropriate) to 5 (where I'm at now and I'm constantly vigilante).\"   His goal is to " "have scores less than 3 for 4 out of 5 days at work for four consecutive weeks.    Objective #A Address the \" 'hypervigilence' I feel at work.  I like the energy it gives me but I don't think it's good for my body to constantly be in that state.    Status: New - Date: 12/23/20     Patient will identify triggering fears / thoughts that contribute to feeling anxious.    Intervention(s)  Therapist will assign homework as appropriate  teach about hypervigilence as a defense mechanism  Identify and problem solve barriers to reaching goal.    Objective #B  Learn to relax at home.  Patient will use relaxation strategies 1 times per day to reduce the physical symptoms of anxiety.    Status: New - Date: 12/23/20     Intervention(s)  Therapist will assign homework as appropriate  teach relaxation skills  Problem solve around barriers.      Patient has not reviewed nor agreed to the above plan.      Farhat Russell  March 31, 2021    "

## 2021-04-07 ENCOUNTER — VIRTUAL VISIT (OUTPATIENT)
Dept: PSYCHOLOGY | Facility: CLINIC | Age: 26
End: 2021-04-07
Payer: COMMERCIAL

## 2021-04-07 DIAGNOSIS — F33.41 MAJOR DEPRESSIVE DISORDER, RECURRENT EPISODE, IN PARTIAL REMISSION (H): Primary | ICD-10-CM

## 2021-04-07 PROCEDURE — 90834 PSYTX W PT 45 MINUTES: CPT | Mod: GT | Performed by: PSYCHOLOGIST

## 2021-04-07 NOTE — PROGRESS NOTES
"                                           Progress Note    Patient Name: Carrillo Aceves  Date: 4/7/21         Service Type: Individual      Session Start Time: 3:01pm Session End Time: 3:53pm     Session Length: 52 minutes     Session #: 16    Attendees: Client attended alone    Service Modality:  Video Visit:      Provider verified identity through the following two step process.  Patient provided:  Patient photo    Telemedicine Visit: The patient's condition can be safely assessed and treated via synchronous audio and visual telemedicine encounter.      Reason for Telemedicine Visit: Services only offered telehealth    Originating Site (Patient Location): Patient's home    Distant Site (Provider Location): Provider Remote Setting    Consent:  The patient/guardian has verbally consented to: the potential risks and benefits of telemedicine (video visit) versus in person care; bill my insurance or make self-payment for services provided; and responsibility for payment of non-covered services.     Patient would like the video invitation sent by: Send to e-mail at: [email or web address]    Mode of Communication:  Video Conference via [employer]    As the provider I attest to compliance with applicable laws and regulations related to telemedicine.     Treatment Plan Last Reviewed: Started collaborating on this session  PHQ-9 / DORY-7 : 14 / 10 (on 12/3/20); 12 / 9 (on 1/27/21)    DATA  Interactive Complexity: No  Crisis: No       Progress Since Last Session (Related to Symptoms / Goals / Homework):   Symptoms: Stable: \" I'm alright.  I've been feeling a little sick. \"    Homework: Partially completed      Episode of Care Goals: Satisfactory progress - ACTION (Actively working towards change); Intervened by reinforcing change plan / affirming steps taken     Current / Ongoing Stressors and Concerns:   Work; school      Treatment Objective(s) Addressed in This Session:   Review and assign homework; discussed certain results of " "the MMPI-2 and answer questions; evaluated in-depth how he experiences criticism and perceives himself; discussed \"losing time\" due to ADHD       Notes from 8/7/21 Session:  Reviewed homework, from MMPI-2:  He does not feel defective or of being worse than other people but I do feel sensitive to criticism or has fear being discovered (like people knowing what I'm feeling).  Like I want to present myself a certain way.  I want to filter every thought or action and not act spontaneously.  Has always feared saying or doing the wrong thing so he filters all his thoughts and behaviors.  I also engaged in a lot of self-criticism or judgment.  I don't want to present any weaknesses (so mistakes are not allowed).      Theme that keeps coming up is avoiding criticism or commentary (not be seen or to be left alone).  May be protecting his self-image and who he believes himself to be.    He believes his mom has unconditional love for him.  He doesn't believe criticism reflects on him as a person, it feels more like getting shocked, physically, by someone.    HW:  What is the cause of the physical feelings that come from outside criticism?  \"I'll have to think about it.\"    \"I'm not really criticized anymore.\"  His measured, calculated thought process leaves less to be critiqued.  It may interfere at the emotional level with people.  I've also not feel lonely.    Wants to talk about \"losing time\" or \"not feeling like its the right time to do something or feel like doing them.\"  Depression (low grade)?\" - Push the body so the brain follows (this resononate).       Intervention:   CBT: Psychoeducation; pattern recognition; Assigned and reviewed Homework; automatic thoughts  Discussed and suggested skills for losing time - provided self-help websites (emailed via [employer]); active listening and other rapport building skills; discussed Mind-Body connection particuarly regarding managing depressive symptoms          ASSESSMENT: " Current Emotional / Mental Status (status of significant symptoms):   Risk status (Self / Other harm or suicidal ideation)   Patient denies current fears or concerns for personal safety.   Patient denies current or recent suicidal ideation or behaviors.   Patient denies current or recent homicidal ideation or behaviors.   Patient denies current or recent self injurious behavior or ideation.   Patient denies other safety concerns.   Patient reports there has been no change in risk factors since their last session.     Patient reports there has been no change in protective factors since their last session.     Recommended that patient call 911 or go to the local ED should there be a change in any of these risk factors.     Appearance:   Appropriate    Eye Contact:   Good    Psychomotor Behavior: Normal    Attitude:   Cooperative    Orientation:   All   Speech    Rate / Production: Normal/ Responsive Normal     Volume:  Normal    Mood:    Euthymic   Affect:    Appropriate    Thought Content:  Clear    Thought Form:  Coherent  Logical    Insight:    Good  and Fair      Medication Review:   No changes to current psychiatric medication(s)     Medication Compliance:   Yes     Changes in Health Issues:   Yes: Client has had a headache since Sunday and a stiff neck     Chemical Use Review:   Substance Use: Chemical use reviewed, no active concerns identified      Tobacco Use: No current tobacco use.      Diagnosis:  1. Major depressive disorder, recurrent episode, in partial remission (H)        Collateral Reports Completed:   Not Applicable    PLAN: (Patient Tasks / Therapist Tasks / Other)  Client will research provided ADHD self-help websites and will discuss questions at the next session.  His next scheduled appointment is April 14, 2021.        Farhat Russell                                                         ______________________________________________________________________    Treatment Plan    Patient's  "Name: Carrillo Aceves  YOB: 1995    Date: 12/16/20    DSM5 Diagnoses: 296.35 (F33.41)  Major Depressive Disorder, Recurrent Episode, In partial remission _ and With anxious distress  Psychosocial / Contextual Factors: School  WHODAS: 32 (on 12/3/20)    Referral / Collaboration:  Referral to another professional/service is not indicated at this time.    Anticipated number of session or this episode of care: 12      MeasurableTreatment Goal(s) related to diagnosis / functional impairment(s)  Goal 1: Patient will have control over his day-to-day schedule.  I want to establish a routine everyday.  I want to meditate every day and workout four times a week.  Falling asleep can be an issue too.  I want to work on procrastination I have.\"    I will know I've met my goal when I not only have a consistent daily routine but also being able to follow through on plans that I make when there isn't a good reason for me not to follow through.      Objective #A Client will workout four times a week    Patient will exercise for 30 minutes 4 times per week for 45 minutes.  Status: New - Date: 12/23/20     Intervention(s)  Therapist will assign homework as appropriate  provide encourage and accountability to work out.  Problem solve when necessary.    Objective #B  Client will meditate every day  Patient will use relaxation strategies one times per day to reduce the physical symptoms of anxiety.  Status: New - Date: 12/23/20     Intervention(s)  Therapist will assign homework as appropriate  problem solve as necessary.    Objective #C  \"I want to work on procrastination when necessary\"  Patient will use cognitive strategies identified in therapy to challenge anxious thoughts.  Status: New - Date: 12/23/20     Intervention(s)  Therapist will assign homework as appropriate  teach on topics relating to procrastination  Identifty and problem solve barriers to reaching goal.      Goal 2: Patient will develop a healthy sleep " "routine.    I will know I've met my goal when I can fall asleep consistently and I don't have a good reason to have disruptive sleep.  I would like to be able to fall asleep within ten minutes and be able to fall back asleep within ten minutes if I wake up in the middle of the night.      Objective #A Start to develop a sleep schedule    Status: New - Date: 12/23/20     Patient will identify healthy sleep hygiene practices.    Intervention(s)  Therapist will assign homework as appropriate  teach about sleep hygiene/routine.    Objective #B  Patient will TBD.    Status: TBD     Intervention(s)  Therapist will TBD.    Goal 3: Patient will \"learn skills to manage anxiety.\"    I will know I've met my goal when I feel like my level of anxiety is appropriate to the situations that I'm in.   Client would like to rate each day, \"1 (feeling that the anxiety he felt was appropriate) to 5 (where I'm at now and I'm constantly vigilante).\"   His goal is to have scores less than 3 for 4 out of 5 days at work for four consecutive weeks.    Objective #A Address the \" 'hypervigilence' I feel at work.  I like the energy it gives me but I don't think it's good for my body to constantly be in that state.    Status: New - Date: 12/23/20     Patient will identify triggering fears / thoughts that contribute to feeling anxious.    Intervention(s)  Therapist will assign homework as appropriate  teach about hypervigilence as a defense mechanism  Identify and problem solve barriers to reaching goal.    Objective #B  Learn to relax at home.  Patient will use relaxation strategies 1 times per day to reduce the physical symptoms of anxiety.    Status: New - Date: 12/23/20     Intervention(s)  Therapist will assign homework as appropriate  teach relaxation skills  Problem solve around barriers.      Patient has not reviewed nor agreed to the above plan.      Farhat Russell  April 7, 2021  "

## 2021-04-11 ENCOUNTER — HEALTH MAINTENANCE LETTER (OUTPATIENT)
Age: 26
End: 2021-04-11

## 2021-04-14 ENCOUNTER — VIRTUAL VISIT (OUTPATIENT)
Dept: PSYCHOLOGY | Facility: CLINIC | Age: 26
End: 2021-04-14
Payer: COMMERCIAL

## 2021-04-14 DIAGNOSIS — F33.41 MAJOR DEPRESSIVE DISORDER, RECURRENT EPISODE, IN PARTIAL REMISSION (H): Primary | ICD-10-CM

## 2021-04-14 PROCEDURE — 90834 PSYTX W PT 45 MINUTES: CPT | Mod: GT | Performed by: PSYCHOLOGIST

## 2021-04-14 NOTE — PROGRESS NOTES
"                                           Progress Note    Patient Name: Carrillo Aceves  Date: 4/7/21         Service Type: Individual      Session Start Time: 4:02pm Session End Time: 4:54pm     Session Length: 52 minutes     Session #: 17    Attendees: Client attended alone    Service Modality:  Video Visit:      Provider verified identity through the following two step process.  Patient provided:  Patient photo    Telemedicine Visit: The patient's condition can be safely assessed and treated via synchronous audio and visual telemedicine encounter.      Reason for Telemedicine Visit: Services only offered telehealth    Originating Site (Patient Location): Patient's home    Distant Site (Provider Location): Provider Remote Setting    Consent:  The patient/guardian has verbally consented to: the potential risks and benefits of telemedicine (video visit) versus in person care; bill my insurance or make self-payment for services provided; and responsibility for payment of non-covered services.     Patient would like the video invitation sent by: Send to e-mail at: [email or web address]    Mode of Communication:  Video Conference via [employer]    As the provider I attest to compliance with applicable laws and regulations related to telemedicine.     Treatment Plan Last Reviewed: Started collaborating on this session  PHQ-9 / DORY-7 : 14 / 10 (on 12/3/20); 12 / 9 (on 1/27/21)    DATA  Interactive Complexity: No  Crisis: No       Progress Since Last Session (Related to Symptoms / Goals / Homework):   Symptoms: Stable: \" I'm ok. \"    Homework: Partially completed      Episode of Care Goals: Satisfactory progress - ACTION (Actively working towards change); Intervened by reinforcing change plan / affirming steps taken     Current / Ongoing Stressors and Concerns:   Work; school      Treatment Objective(s) Addressed in This Session:   Review and assign homework; discussed and suggested study skills; evaluated client's difficulties " "with establishing an exercise and meditation routine      Notes from 4/14/21 Session:  Got a new mattress, sleeping better  Struggling with doing homework  Discussed study habits, red flags that he is not sleeping well, went over sleep, eating healthy, and exercising to help with studying.  Wants to establish an exercise routine (he downloaded a planner asael - he will put his whole schedule on the weekly calendar).  He feels absorbing information is easier than doing anything with it is harder.  Why?  \"Whenever I start feeling any emotion my body starts to shutdown and I get sluggish, I can't of get overwhelmed and I freeze up.  Any emotion I feel strongly.\" - Anger, Irritation, frustration, embarrassment, a reaction to other people have strong reactions around me, may be Fight or Flight or \"disocciation from my body, and brain too, involved.\"  He describes it as, \"My attention will focus on a feeling of tension, usually in my head, and that dominates my conscious awareness.\"  Discussed benefits to meditating (clearer self-awareness, overall stress reduction, cognitive benefit of mental clarity instead foggy, emotional benefits of at peace/calmer [I haven't really noticed any of those]).  He doesn't always feel like working out, he will start at 20 minutes and will add a minute a night until 60 minutes.    HW/PLAN:   Next session he will talk about books he's read and gotten from [facility].    He is going to use a weekly planner.    Pay attention to natural times when you are more alert and schedule studying around that time (if possible)    Use a timer to limit \"breaks\" from studying.       Intervention:   CBT: Psychoeducation; pattern recognition; Assigned and reviewed Homework; problem solve  Active listening; empathy; rapport building skills          ASSESSMENT: Current Emotional / Mental Status (status of significant symptoms):   Risk status (Self / Other harm or suicidal ideation)   Patient denies current " fears or concerns for personal safety.   Patient denies current or recent suicidal ideation or behaviors.   Patient denies current or recent homicidal ideation or behaviors.   Patient denies current or recent self injurious behavior or ideation.   Patient denies other safety concerns.   Patient reports there has been no change in risk factors since their last session.     Patient reports there has been no change in protective factors since their last session.     Recommended that patient call 911 or go to the local ED should there be a change in any of these risk factors.     Appearance:   Appropriate    Eye Contact:   Good  (online.  He notes his eye contact is poor in person).   Psychomotor Behavior: Normal    Attitude:   Cooperative    Orientation:   All   Speech    Rate / Production: Normal/ Responsive Normal     Volume:  Normal    Mood:    Euthymic   Affect:    Appropriate    Thought Content:  Clear    Thought Form:  Coherent  Logical    Insight:    Good  and Fair      Medication Review:   No changes to current psychiatric medication(s)     Medication Compliance:   Yes     Changes in Health Issues:   Yes: Client has had a headache since Sunday and a stiff neck     Chemical Use Review:   Substance Use: Chemical use reviewed, no active concerns identified      Tobacco Use: No current tobacco use.      Diagnosis:  1. Major depressive disorder, recurrent episode, in partial remission (H)        Collateral Reports Completed:   Not Applicable    PLAN: (Patient Tasks / Therapist Tasks / Other)  Client will complete his weekly planner and track what he is, and is not, able to get done.  He will also track how long it takes him to do tasks.  In addition, he could also track times he is naturally able to focus and concentrate in order to schedule homework during these times.  Also discussed was setting a timer to remind him when work breaks are over.  His next scheduled appointment is April 22, 2021.        Farhat  "Lopez Russell                                                         ______________________________________________________________________    Treatment Plan    Patient's Name: Carrillo Aceves  YOB: 1995    Date: 12/16/20    DSM5 Diagnoses: 296.35 (F33.41)  Major Depressive Disorder, Recurrent Episode, In partial remission _ and With anxious distress  Psychosocial / Contextual Factors: School  WHODAS: 32 (on 12/3/20)    Referral / Collaboration:  Referral to another professional/service is not indicated at this time.    Anticipated number of session or this episode of care: 12      MeasurableTreatment Goal(s) related to diagnosis / functional impairment(s)  Goal 1: Patient will have control over his day-to-day schedule.  I want to establish a routine everyday.  I want to meditate every day and workout four times a week.  Falling asleep can be an issue too.  I want to work on procrastination I have.\"    I will know I've met my goal when I not only have a consistent daily routine but also being able to follow through on plans that I make when there isn't a good reason for me not to follow through.      Objective #A Client will workout four times a week    Patient will exercise for 30 minutes 4 times per week for 45 minutes.  Status: New - Date: 12/23/20     Intervention(s)  Therapist will assign homework as appropriate  provide encourage and accountability to work out.  Problem solve when necessary.    Objective #B  Client will meditate every day  Patient will use relaxation strategies one times per day to reduce the physical symptoms of anxiety.  Status: New - Date: 12/23/20     Intervention(s)  Therapist will assign homework as appropriate  problem solve as necessary.    Objective #C  \"I want to work on procrastination when necessary\"  Patient will use cognitive strategies identified in therapy to challenge anxious thoughts.  Status: New - Date: 12/23/20     Intervention(s)  Therapist will assign homework " "as appropriate  teach on topics relating to procrastination  Identifty and problem solve barriers to reaching goal.      Goal 2: Patient will develop a healthy sleep routine.    I will know I've met my goal when I can fall asleep consistently and I don't have a good reason to have disruptive sleep.  I would like to be able to fall asleep within ten minutes and be able to fall back asleep within ten minutes if I wake up in the middle of the night.      Objective #A Start to develop a sleep schedule    Status: New - Date: 12/23/20     Patient will identify healthy sleep hygiene practices.    Intervention(s)  Therapist will assign homework as appropriate  teach about sleep hygiene/routine.    Objective #B  Patient will TBD.    Status: TBD     Intervention(s)  Therapist will TBD.    Goal 3: Patient will \"learn skills to manage anxiety.\"    I will know I've met my goal when I feel like my level of anxiety is appropriate to the situations that I'm in.   Client would like to rate each day, \"1 (feeling that the anxiety he felt was appropriate) to 5 (where I'm at now and I'm constantly vigilante).\"   His goal is to have scores less than 3 for 4 out of 5 days at work for four consecutive weeks.    Objective #A Address the \" 'hypervigilence' I feel at work.  I like the energy it gives me but I don't think it's good for my body to constantly be in that state.    Status: New - Date: 12/23/20     Patient will identify triggering fears / thoughts that contribute to feeling anxious.    Intervention(s)  Therapist will assign homework as appropriate  teach about hypervigilence as a defense mechanism  Identify and problem solve barriers to reaching goal.    Objective #B  Learn to relax at home.  Patient will use relaxation strategies 1 times per day to reduce the physical symptoms of anxiety.    Status: New - Date: 12/23/20     Intervention(s)  Therapist will assign homework as appropriate  teach relaxation skills  Problem solve " around barriers.      Patient has not reviewed nor agreed to the above plan.      Farhat Russell  April 14, 2021

## 2021-04-22 ENCOUNTER — VIRTUAL VISIT (OUTPATIENT)
Dept: PSYCHOLOGY | Facility: CLINIC | Age: 26
End: 2021-04-22
Payer: COMMERCIAL

## 2021-04-22 DIAGNOSIS — F33.41 MAJOR DEPRESSIVE DISORDER, RECURRENT EPISODE, IN PARTIAL REMISSION (H): Primary | ICD-10-CM

## 2021-04-22 PROCEDURE — 90834 PSYTX W PT 45 MINUTES: CPT | Mod: GT | Performed by: PSYCHOLOGIST

## 2021-04-22 NOTE — PROGRESS NOTES
"                                           Progress Note    Patient Name: Carrillo Aceves  Date: 4/22/21         Service Type: Individual      Session Start Time: 3:02pm Session End Time: 3:54pm     Session Length: 52 minutes     Session #: 18    Attendees: Client attended alone    Service Modality:  Video Visit:      Provider verified identity through the following two step process.  Patient provided:  Patient photo    Telemedicine Visit: The patient's condition can be safely assessed and treated via synchronous audio and visual telemedicine encounter.      Reason for Telemedicine Visit: Services only offered telehealth    Originating Site (Patient Location): Patient's home    Distant Site (Provider Location): Provider Remote Setting    Consent:  The patient/guardian has verbally consented to: the potential risks and benefits of telemedicine (video visit) versus in person care; bill my insurance or make self-payment for services provided; and responsibility for payment of non-covered services.     Patient would like the video invitation sent by: Send to e-mail at: [email or web address]    Mode of Communication:  Video Conference via Amwell    As the provider I attest to compliance with applicable laws and regulations related to telemedicine.     Treatment Plan Last Reviewed: Started collaborating on this session  PHQ-9 / DORY-7 : 14 / 10 (on 12/3/20); 12 / 9 (on 1/27/21)    DATA  Interactive Complexity: No  Crisis: No       Progress Since Last Session (Related to Symptoms / Goals / Homework):   Symptoms: Stable: \" I've been recovering (slight cold). \"    Homework: Partially completed      Episode of Care Goals: Satisfactory progress - ACTION (Actively working towards change); Intervened by reinforcing change plan / affirming steps taken     Current / Ongoing Stressors and Concerns:   Work; school      Treatment Objective(s) Addressed in This Session:   Review and assign homework; reviewed sleep; client talked about the " "use of a new scheduling application that he is using; discussed homework based on the new scheduling application; also discussed the importance of accepting and identifying emotions which the client agreed could be a focus in the near future; he also discussed a book he is reading (see notes below for title)          Notes from 4/22/21 Session:    HW: Tracking tasks, I typically get one or two things done - \"More than usual.\"  Attributes this to writing them down, planning.   Tasks he got done was exercise and meditation; he's going to try to \"tie events to a certain time of the day.\"   He will add his everyday tasks in now too (cleaning, picking up, and so forth)   Going to log naps, has not tried to schedule a bedtime.   Sleep is getting better as he's \"gotten used to it.\"   He has not been able to get homework done.  \"I tried to a couple nights ago but I was too tired. Usually if I can get started I can get  most of the things done.\"  He doesn't feel motivated to do the homework, \"Too much thinking, too hard.\"   He would like to get into a four year school ([employer]). He also wants to eventually go onto graduate school.-- Use it as incentive?   Client will sometimes and \"deny or ignore anxiety.\"  Explained how to listen to emotions and act (approach anxiety).   He believes assessing emotions, action urges, and behaviors (\"the last 8-10 years I've just put up with it and tried to push on\").   What is he trying to get out of meditation and mindfulness.    Client referred this writer a book's website that he reads: http://www.mctb.org/mctb2/kphqc-az-xhhjdddz/  \"Mastering the Core Teachings of the Core Bhudda: An Unusually Hardcore Franck book.\" - Part 5    He wants to learn how to control his focus.  \"At work, my mind is always somewhere else, like a dream I had, what I'm dreaming, a movie I saw, usually fictional things.\"  Mindfulness and managing his focus.       Intervention:   CBT: Emotion identifcation " (Having a conversation with your emotions to identify emotion and the urge and action assoicated with the emotion); sychoeducation; pattern recognition; Assigned and reviewed Homework; problem solve; reframing; purpose of emotions  Active listening; empathy; rapport building skills; purpose of meditation and introduction to a book client is reading for self-help          ASSESSMENT: Current Emotional / Mental Status (status of significant symptoms):   Risk status (Self / Other harm or suicidal ideation)   Patient denies current fears or concerns for personal safety.   Patient denies current or recent suicidal ideation or behaviors.   Patient denies current or recent homicidal ideation or behaviors.   Patient denies current or recent self injurious behavior or ideation.   Patient denies other safety concerns.   Patient reports there has been no change in risk factors since their last session.     Patient reports there has been no change in protective factors since their last session.     Recommended that patient call 911 or go to the local ED should there be a change in any of these risk factors.     Appearance:   Appropriate    Eye Contact:   Good  (online.  He notes his eye contact is poor in person).   Psychomotor Behavior: Normal    Attitude:   Cooperative    Orientation:   All   Speech    Rate / Production: Normal/ Responsive Normal     Volume:  Normal    Mood:    Euthymic   Affect:    Appropriate    Thought Content:  Clear    Thought Form:  Coherent  Logical    Insight:    Good  and Fair      Medication Review:   No changes to current psychiatric medication(s)     Medication Compliance:   Yes     Changes in Health Issues:   Yes: Client has had a headache since Sunday and a stiff neck     Chemical Use Review:   Substance Use: Chemical use reviewed, no active concerns identified      Tobacco Use: No current tobacco use.      Diagnosis:  1. Major depressive disorder, recurrent episode, in partial remission (H)   "      Collateral Reports Completed:   Not Applicable    PLAN: (Patient Tasks / Therapist Tasks / Other)  Client will complete his weekly planner and track what he is, and is not, able to get done.  He will not tie each event to a specific time of day.  He will also track how long it takes him to do tasks.  In addition, client will add everyday tasks to his schedule (cleaning, picking).  he could also track times he is naturally able to focus and concentrate in order to schedule homework during these times.  Also discussed was setting a timer to remind him when work breaks are over.  His next scheduled appointment is April 22, 2021.        Farhat Marroquin Drew                                                         ______________________________________________________________________    Treatment Plan    Patient's Name: Carrillo Aceves  YOB: 1995    Date: 12/16/20    DSM5 Diagnoses: 296.35 (F33.41)  Major Depressive Disorder, Recurrent Episode, In partial remission _ and With anxious distress  Psychosocial / Contextual Factors: School  WHODAS: 32 (on 12/3/20)    Referral / Collaboration:  Referral to another professional/service is not indicated at this time.    Anticipated number of session or this episode of care: 12      MeasurableTreatment Goal(s) related to diagnosis / functional impairment(s)  Goal 1: Patient will have control over his day-to-day schedule.  I want to establish a routine everyday.  I want to meditate every day and workout four times a week.  Falling asleep can be an issue too.  I want to work on procrastination I have.\"    I will know I've met my goal when I not only have a consistent daily routine but also being able to follow through on plans that I make when there isn't a good reason for me not to follow through.      Objective #A Client will workout four times a week    Patient will exercise for 30 minutes 4 times per week for 45 minutes.  Status: New - Date: 12/23/20 " "    Intervention(s)  Therapist will assign homework as appropriate  provide encourage and accountability to work out.  Problem solve when necessary.    Objective #B  Client will meditate every day  Patient will use relaxation strategies one times per day to reduce the physical symptoms of anxiety.  Status: New - Date: 12/23/20     Intervention(s)  Therapist will assign homework as appropriate  problem solve as necessary.    Objective #C  \"I want to work on procrastination when necessary\"  Patient will use cognitive strategies identified in therapy to challenge anxious thoughts.  Status: New - Date: 12/23/20     Intervention(s)  Therapist will assign homework as appropriate  teach on topics relating to procrastination  Identifty and problem solve barriers to reaching goal.      Goal 2: Patient will develop a healthy sleep routine.    I will know I've met my goal when I can fall asleep consistently and I don't have a good reason to have disruptive sleep.  I would like to be able to fall asleep within ten minutes and be able to fall back asleep within ten minutes if I wake up in the middle of the night.      Objective #A Start to develop a sleep schedule    Status: New - Date: 12/23/20     Patient will identify healthy sleep hygiene practices.    Intervention(s)  Therapist will assign homework as appropriate  teach about sleep hygiene/routine.    Objective #B  Patient will TBD.    Status: TBD     Intervention(s)  Therapist will TBD.    Goal 3: Patient will \"learn skills to manage anxiety.\"    I will know I've met my goal when I feel like my level of anxiety is appropriate to the situations that I'm in.   Client would like to rate each day, \"1 (feeling that the anxiety he felt was appropriate) to 5 (where I'm at now and I'm constantly vigilante).\"   His goal is to have scores less than 3 for 4 out of 5 days at work for four consecutive weeks.    Objective #A Address the \" 'hypervigilence' I feel at work.  I like the " energy it gives me but I don't think it's good for my body to constantly be in that state.    Status: New - Date: 12/23/20     Patient will identify triggering fears / thoughts that contribute to feeling anxious.    Intervention(s)  Therapist will assign homework as appropriate  teach about hypervigilence as a defense mechanism  Identify and problem solve barriers to reaching goal.    Objective #B  Learn to relax at home.  Patient will use relaxation strategies 1 times per day to reduce the physical symptoms of anxiety.    Status: New - Date: 12/23/20     Intervention(s)  Therapist will assign homework as appropriate  teach relaxation skills  Problem solve around barriers.      Patient has not reviewed nor agreed to the above plan.      Farhat Russell  April 22, 2021

## 2021-05-05 ENCOUNTER — VIRTUAL VISIT (OUTPATIENT)
Dept: PSYCHOLOGY | Facility: CLINIC | Age: 26
End: 2021-05-05
Payer: COMMERCIAL

## 2021-05-05 DIAGNOSIS — F33.41 MAJOR DEPRESSIVE DISORDER, RECURRENT EPISODE, IN PARTIAL REMISSION (H): Primary | ICD-10-CM

## 2021-05-05 PROCEDURE — 90834 PSYTX W PT 45 MINUTES: CPT | Mod: GT | Performed by: PSYCHOLOGIST

## 2021-05-05 NOTE — PROGRESS NOTES
"                                           Progress Note    Patient Name: Carrillo Aceves  Date: 5/5/21         Service Type: Individual      Session Start Time: 4:02pm Session End Time: 4:51pm     Session Length: 49 minutes     Session #: 19    Attendees: Client attended alone    Service Modality:  Video Visit:      Provider verified identity through the following two step process.  Patient provided:  Patient photo    Telemedicine Visit: The patient's condition can be safely assessed and treated via synchronous audio and visual telemedicine encounter.      Reason for Telemedicine Visit: Services only offered telehealth    Originating Site (Patient Location): Patient's home    Distant Site (Provider Location): Provider Remote Setting    Consent:  The patient/guardian has verbally consented to: the potential risks and benefits of telemedicine (video visit) versus in person care; bill my insurance or make self-payment for services provided; and responsibility for payment of non-covered services.     Patient would like the video invitation sent by: Send to e-mail at: [email or web address]    Mode of Communication:  Video Conference via [employer]    As the provider I attest to compliance with applicable laws and regulations related to telemedicine.     Treatment Plan Last Reviewed: 12/16/20  PHQ-9 / DORY-7 : 14 / 10 (on 12/3/20); 12 / 9 (on 1/27/21)    DATA  Interactive Complexity: No  Crisis: No       Progress Since Last Session (Related to Symptoms / Goals / Homework):   Symptoms: Stable: \" I've been recovering still. \"    Homework: Partially completed      Episode of Care Goals: Satisfactory progress - ACTION (Actively working towards change); Intervened by reinforcing change plan / affirming steps taken     Current / Ongoing Stressors and Concerns:   Work; school      Treatment Objective(s) Addressed in This Session:   Discussed client's current illness and how it affects school, work, personal life; reviewed homework from " "last week and assigned new homework; also discussed plans for next sessions topic         Notes from 5/5/21 Session:    Finals next week   He's working on the ADHD Qs (has one from his mom and sister)   HW: He will now try to tie a specific time and day to do a task one day or one week ahead of time.  He's already made a To Do List.  Recommended: Do one small step to prepare for jobs that are not difficult but not enjoyable.  Also, do prep work for big jobs the day before.   Client has been sick for the last 2-3 weeks; he is feeling better; however, \"I tend to put everything on the back burner when I'm sick.\"   \"I have noticed that my motivation to get things done tends to spike when I start to get sick.\"   He's been waking up early the last couple days.   Last week, he discussed:  Client referred this writer a book's website that he reads: http://www.mctb.org/mctb2/ofxoc-wr-sxlbtkar/\"Mastering the Core Teachings of the Core Bhudda: An Unusually Hardcore Franck book.\" - Part 5    He wants to learn how to control his focus.  \"At work, my mind is always somewhere else, like a dream I had, what I'm dreaming, a movie I saw, usually fictional things.\"  Mindfulness and managing his focus.     Client observed he gets good grades in math and physics and C's and D's in my elective classes.   Client does not like to write his Opinion in a paper for school, \" It can be too much work (research) or I just don't care.  \"  I really dislike being wrong about something.\"    PLAN:  Client may turn in his ADHD Qs tomorrow to discuss next session or he will discuss his upcoming writing class he has dropped out of three times and is his last class for his associates degree.      Intervention:   CBT: Psychoeducation; pattern recognition; Assigned and reviewed Homework; problem solved  Discussed ADHD assessment; Active listening; empathy; rapport building skills; planned for next session          ASSESSMENT: Current Emotional / Mental " Status (status of significant symptoms):   Risk status (Self / Other harm or suicidal ideation)   Patient denies current fears or concerns for personal safety.   Patient denies current or recent suicidal ideation or behaviors.   Patient denies current or recent homicidal ideation or behaviors.   Patient denies current or recent self injurious behavior or ideation.   Patient denies other safety concerns.   Patient reports there has been no change in risk factors since their last session.     Patient reports there has been no change in protective factors since their last session.     Recommended that patient call 911 or go to the local ED should there be a change in any of these risk factors.     Appearance:   Appropriate    Eye Contact:   Good  (online.  He notes his eye contact is poor in person).   Psychomotor Behavior: Normal    Attitude:   Cooperative    Orientation:   All   Speech    Rate / Production: Normal/ Responsive Normal     Volume:  Normal    Mood:    Euthymic   Affect:    Appropriate    Thought Content:  Clear    Thought Form:  Coherent  Logical    Insight:    Good  and Fair      Medication Review:   No changes to current psychiatric medication(s)     Medication Compliance:   Yes     Changes in Health Issues:   Yes: Client has had a headache since Sunday and a stiff neck     Chemical Use Review:   Substance Use: Chemical use reviewed, no active concerns identified      Tobacco Use: No current tobacco use.      Diagnosis:  1. Major depressive disorder, recurrent episode, in partial remission (H)        Collateral Reports Completed:   Not Applicable    PLAN: (Patient Tasks / Therapist Tasks / Other)  Client will add everyday tasks to his schedule (cleaning, picking) and try scheduling them for a specific time and day.  He will first focus on studying for next week's finals.  His next scheduled appointment is May 12, 2021.        Farhat Russell                                                      "    ______________________________________________________________________    Treatment Plan    Patient's Name: Carrillo Aceves  YOB: 1995    Date: 12/16/20    DSM5 Diagnoses: 296.35 (F33.41)  Major Depressive Disorder, Recurrent Episode, In partial remission _ and With anxious distress  Psychosocial / Contextual Factors: School  WHODAS: 32 (on 12/3/20)    Referral / Collaboration:  Referral to another professional/service is not indicated at this time.    Anticipated number of session or this episode of care: 12      MeasurableTreatment Goal(s) related to diagnosis / functional impairment(s)  Goal 1: Patient will have control over his day-to-day schedule.  I want to establish a routine everyday.  I want to meditate every day and workout four times a week.  Falling asleep can be an issue too.  I want to work on procrastination I have.\"    I will know I've met my goal when I not only have a consistent daily routine but also being able to follow through on plans that I make when there isn't a good reason for me not to follow through.      Objective #A Client will workout four times a week    Patient will exercise for 30 minutes 4 times per week for 45 minutes.  Status: New - Date: 12/23/20     Intervention(s)  Therapist will assign homework as appropriate  provide encourage and accountability to work out.  Problem solve when necessary.    Objective #B  Client will meditate every day  Patient will use relaxation strategies one times per day to reduce the physical symptoms of anxiety.  Status: New - Date: 12/23/20     Intervention(s)  Therapist will assign homework as appropriate  problem solve as necessary.    Objective #C  \"I want to work on procrastination when necessary\"  Patient will use cognitive strategies identified in therapy to challenge anxious thoughts.  Status: New - Date: 12/23/20     Intervention(s)  Therapist will assign homework as appropriate  teach on topics relating to " "procrastination  Identifty and problem solve barriers to reaching goal.      Goal 2: Patient will develop a healthy sleep routine.    I will know I've met my goal when I can fall asleep consistently and I don't have a good reason to have disruptive sleep.  I would like to be able to fall asleep within ten minutes and be able to fall back asleep within ten minutes if I wake up in the middle of the night.      Objective #A Start to develop a sleep schedule    Status: New - Date: 12/23/20     Patient will identify healthy sleep hygiene practices.    Intervention(s)  Therapist will assign homework as appropriate  teach about sleep hygiene/routine.    Objective #B  Patient will TBD.    Status: TBD     Intervention(s)  Therapist will TBD.    Goal 3: Patient will \"learn skills to manage anxiety.\"    I will know I've met my goal when I feel like my level of anxiety is appropriate to the situations that I'm in.   Client would like to rate each day, \"1 (feeling that the anxiety he felt was appropriate) to 5 (where I'm at now and I'm constantly vigilante).\"   His goal is to have scores less than 3 for 4 out of 5 days at work for four consecutive weeks.    Objective #A Address the \" 'hypervigilence' I feel at work.  I like the energy it gives me but I don't think it's good for my body to constantly be in that state.    Status: New - Date: 12/23/20     Patient will identify triggering fears / thoughts that contribute to feeling anxious.    Intervention(s)  Therapist will assign homework as appropriate  teach about hypervigilence as a defense mechanism  Identify and problem solve barriers to reaching goal.    Objective #B  Learn to relax at home.  Patient will use relaxation strategies 1 times per day to reduce the physical symptoms of anxiety.    Status: New - Date: 12/23/20     Intervention(s)  Therapist will assign homework as appropriate  teach relaxation skills  Problem solve around barriers.      Patient has not reviewed " nor agreed to the above plan.      Farhat Russell  May 5, 2021

## 2021-05-12 ENCOUNTER — VIRTUAL VISIT (OUTPATIENT)
Dept: PSYCHOLOGY | Facility: CLINIC | Age: 26
End: 2021-05-12
Payer: COMMERCIAL

## 2021-05-12 DIAGNOSIS — F33.41 MAJOR DEPRESSIVE DISORDER, RECURRENT EPISODE, IN PARTIAL REMISSION (H): Primary | ICD-10-CM

## 2021-05-12 PROCEDURE — 90834 PSYTX W PT 45 MINUTES: CPT | Mod: GT | Performed by: PSYCHOLOGIST

## 2021-05-12 ASSESSMENT — ANXIETY QUESTIONNAIRES
4. TROUBLE RELAXING: MORE THAN HALF THE DAYS
7. FEELING AFRAID AS IF SOMETHING AWFUL MIGHT HAPPEN: NOT AT ALL
1. FEELING NERVOUS, ANXIOUS, OR ON EDGE: SEVERAL DAYS
IF YOU CHECKED OFF ANY PROBLEMS ON THIS QUESTIONNAIRE, HOW DIFFICULT HAVE THESE PROBLEMS MADE IT FOR YOU TO DO YOUR WORK, TAKE CARE OF THINGS AT HOME, OR GET ALONG WITH OTHER PEOPLE: SOMEWHAT DIFFICULT
6. BECOMING EASILY ANNOYED OR IRRITABLE: NOT AT ALL
GAD7 TOTAL SCORE: 5
3. WORRYING TOO MUCH ABOUT DIFFERENT THINGS: NOT AT ALL
5. BEING SO RESTLESS THAT IT IS HARD TO SIT STILL: SEVERAL DAYS
2. NOT BEING ABLE TO STOP OR CONTROL WORRYING: SEVERAL DAYS

## 2021-05-12 ASSESSMENT — PATIENT HEALTH QUESTIONNAIRE - PHQ9: SUM OF ALL RESPONSES TO PHQ QUESTIONS 1-9: 10

## 2021-05-12 NOTE — PROGRESS NOTES
"                                           Progress Note    Patient Name: Carrillo Aceves  Date: 5/12/21         Service Type: Individual      Session Start Time: 4:00pm Session End Time: 4:44pm     Session Length: 44 minutes     Session #: 20    Attendees: Client attended alone    Service Modality:  Video Visit:      Provider verified identity through the following two step process.  Patient provided:  Patient photo    Telemedicine Visit: The patient's condition can be safely assessed and treated via synchronous audio and visual telemedicine encounter.      Reason for Telemedicine Visit: Services only offered telehealth    Originating Site (Patient Location): Patient's home    Distant Site (Provider Location): Provider Remote Setting    Consent:  The patient/guardian has verbally consented to: the potential risks and benefits of telemedicine (video visit) versus in person care; bill my insurance or make self-payment for services provided; and responsibility for payment of non-covered services.     Patient would like the video invitation sent by: Send to e-mail at: [email or web address]    Mode of Communication:  Video Conference via Amwell    As the provider I attest to compliance with applicable laws and regulations related to telemedicine.     Treatment Plan Last Reviewed: 12/16/20; Reviewed: 5/12/21  PHQ-9 / DORY-7 : 14 / 10 (on 12/3/20); 12 / 9 (on 1/27/21); 10 / 5 (on 5/12/21)    DATA  Interactive Complexity: No  Crisis: No       Progress Since Last Session (Related to Symptoms / Goals / Homework):   Symptoms: Stable: \" I'm sleepy. \"  Reports not sleeping well through the night, \"It gets cold at night.\"    Homework: Partially completed      Episode of Care Goals: Satisfactory progress - ACTION (Actively working towards change); Intervened by reinforcing change plan / affirming steps taken     Current / Ongoing Stressors and Concerns:   Work; school (finals last week)     Treatment Objective(s) Addressed in This " "Session:   Discussed client's experience taking finals at school; evaluated client's difficulties with taking test; reviewed and revised client's Treatment Plan and goals; readministered DORY-7 and PHQ-9          Notes from 5/5/21 Session:    Completed finals.  Answered 5/6 questions: \"I tend to bounce back and forth between questions and not focus on just one. I felt my brain was working slowly or having difficulty keeping more than one thing in my mind at one time.\"   Difficulty concentrating for long periods of time, partially from losing focus or starting to feel tired, partially out of boredom or feeling like I want to do something else.   Currently he reports \"being able to focus for 30-45 minutes per day.  I can stretch it longer but I can't take a break. I can't get back into it.\"   Reviewed and revised treatment plan   Client wanted a shorter session because he was so tired        PLAN:  Client may turn in his ADHD Qs or he may discuss his upcoming writing class he has dropped out of three times and is his last class for his associates degree.      Intervention:   CBT: Psychoeducation; pattern recognition; Assigned and reviewed Homework; problem solved  Readministered DORY-7 and PHQ-9, evaluated for progress; reviewed and updated his Treatment Plan & goals;  Active listening; empathy; rapport building skills; planned for next session          ASSESSMENT: Current Emotional / Mental Status (status of significant symptoms):   Risk status (Self / Other harm or suicidal ideation)   Patient denies current fears or concerns for personal safety.   Patient denies current or recent suicidal ideation or behaviors.   Patient denies current or recent homicidal ideation or behaviors.   Patient denies current or recent self injurious behavior or ideation.   Patient denies other safety concerns.   Patient reports there has been no change in risk factors since their last session.     Patient reports there has been no change " in protective factors since their last session.     Recommended that patient call 911 or go to the local ED should there be a change in any of these risk factors.     Appearance:   Appropriate    Eye Contact:   Good  (online.  He notes his eye contact is poor in person).   Psychomotor Behavior: Normal    Attitude:   Cooperative    Orientation:   All   Speech    Rate / Production: Normal/ Responsive Normal     Volume:  Normal    Mood:    Euthymic   Affect:    Appropriate    Thought Content:  Clear    Thought Form:  Coherent  Logical    Insight:    Good  and Fair      Medication Review:   No changes to current psychiatric medication(s)     Medication Compliance:   Yes     Changes in Health Issues:   Yes: Client has had a headache since Sunday and a stiff neck     Chemical Use Review:   Substance Use: Chemical use reviewed, no active concerns identified      Tobacco Use: No current tobacco use.      Diagnosis:  1. Major depressive disorder, recurrent episode, in partial remission (H)        Collateral Reports Completed:   Not Applicable    PLAN: (Patient Tasks / Therapist Tasks / Other)  Client will create a To Do schedule weekly and track tasks he was able to complete.  Client will report weekly on the percentage of tasks he was able to complete and look for patterns regarding tasks he did not complete.  His next scheduled appointment is May 20, 2021.        Farhat Russell                                                         ______________________________________________________________________    Treatment Plan    Patient's Name: Carrillo Aceves  YOB: 1995    Date: 12/16/20; Reviewed and Revised: 5/12/21    DSM5 Diagnoses: 296.35 (F33.41)  Major Depressive Disorder, Recurrent Episode, In partial remission _ and With anxious distress  Psychosocial / Contextual Factors: School  WHODAS: 32 (on 12/3/20)    Referral / Collaboration:  Referral to another professional/service is not indicated at this  "time.    Anticipated number of session or this episode of care: 30      MeasurableTreatment Goal(s) related to diagnosis / functional impairment(s)  Goal 1: Patient will \"make a schedule for myself and whether or not I'm following that schedule.  I can identify areas I am completing and ones that I'm not.\"      I will know I've met my goal when I am completing scheduled activities on time and I'm scheduling as many activities as I can so I'm more aware of what I am doing every day.  A successful week is completing 50% of his scheduled activities in a week for two weeks.    Objective #A Client will create his weekly schedule on Sundays.   Patient will create a weekly To Do schedule.  Status: New - Date: 12/23/20     Intervention(s)  Therapist will assign homework as appropriate  Check on progress each session and help problem solve barriers to successfully completing this goal      Goal 2: Patient will develop a healthy sleep routine.    I will know I've met my goal when I can fall asleep consistently and I don't have a good reason to have disruptive sleep.  I would like to be able to fall asleep within ten minutes and be able to fall back asleep within ten minutes if I wake up in the middle of the night.      Objective #A Start to develop a sleep schedule    Status: New - Date: 12/23/20     Patient will identify healthy sleep hygiene practices.    Intervention(s)  Therapist will assign homework as appropriate  teach about sleep hygiene/routine.    Objective #B  Patient will TBD.    Status: TBD     Intervention(s)  Therapist will TBD.    Goal 3: Patient will \"learn to identify his emotional states.\"   Being able to identify my own emotional states.  At the end of each day record emotions throughout the day that I could identify.  To increase the range of emotions that I can identify.    Objective #A Client will list daily the emotions he was able to identify he felt that day.    Status: New - Date: 5/12/21 "     Patient will list emotions he felt daily.    Intervention(s)  Therapist will assign homework as appropriate  teach about emotions and how to differentiate  Identify and problem solve barriers to reaching goal.        Patient has not reviewed nor agreed to the above plan.      Farhat Russell  May 12, 2021

## 2021-05-13 ASSESSMENT — ANXIETY QUESTIONNAIRES: GAD7 TOTAL SCORE: 5

## 2021-05-20 ENCOUNTER — VIRTUAL VISIT (OUTPATIENT)
Dept: PSYCHOLOGY | Facility: CLINIC | Age: 26
End: 2021-05-20
Payer: COMMERCIAL

## 2021-05-20 DIAGNOSIS — F33.41 MAJOR DEPRESSIVE DISORDER, RECURRENT EPISODE, IN PARTIAL REMISSION (H): Primary | ICD-10-CM

## 2021-05-20 PROCEDURE — 90834 PSYTX W PT 45 MINUTES: CPT | Mod: GT | Performed by: PSYCHOLOGIST

## 2021-05-20 NOTE — PROGRESS NOTES
"                                           Progress Note    Patient Name: Carrillo Aceves  Date: 5/20/21         Service Type: Individual      Session Start Time: 3:00pm Session End Time: 3:52pm     Session Length: 52 minutes     Session #: 21    Attendees: Client attended alone    Service Modality:  Video Visit:      Provider verified identity through the following two step process.  Patient provided:  Patient photo    Telemedicine Visit: The patient's condition can be safely assessed and treated via synchronous audio and visual telemedicine encounter.      Reason for Telemedicine Visit: Services only offered telehealth    Originating Site (Patient Location): Patient's home    Distant Site (Provider Location): Provider Remote Setting    Consent:  The patient/guardian has verbally consented to: the potential risks and benefits of telemedicine (video visit) versus in person care; bill my insurance or make self-payment for services provided; and responsibility for payment of non-covered services.     Patient would like the video invitation sent by: Send to e-mail at: [email or web address]    Mode of Communication:  Video Conference via [employer]    As the provider I attest to compliance with applicable laws and regulations related to telemedicine.     Treatment Plan Last Reviewed: 12/16/20; Reviewed: 5/12/21  PHQ-9 / DORY-7 : 14 / 10 (on 12/3/20); 12 / 9 (on 1/27/21); 10 / 5 (on 5/12/21)    DATA  Interactive Complexity: No  Crisis: No       Progress Since Last Session (Related to Symptoms / Goals / Homework):   Symptoms: Stable: \" I'm ok. 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     Treatment Objective(s) Addressed in This Session:   Client discussed his stress at work and " "possibly why it was not as stressful as he anticipated; discussed being assertive and advocating for himself regarding his final grade at school; client would like to donate plasma yet could not due to too high of a heart rate. The client would like to work on his breathing in order to lower his heart rate so he can donate         Notes from 5/5/21 Session:    Telling people what to do was less stressful than he thought it would be, discussed   Got his final grade in and was 1/2% below a B (he wrote his professor an email to get a B)   He donates plasma - had to stop two years ago because his \"heart rate was too high.\"   Rec taking long deep breathes   He gets \"hypervigelente when I focus on my breathing, trying to pay attention to what everyone around me is doing.\"   Worked on breathing to slow it down and have deeper breathes (slow down his heart rate)   Maybe go back to structured breathing (Box Breathing)       HW:  Box Breathing, work to get his resting respiratory rate around 12-15 breathes per minute, he will be mindful of the \"pressure\" in his chest indicating lacdk of oxygen; He wants to learn the difference between chest pressure due to low oxygen (taking too short of breaths) and from anxiety.    \"I always seem to struggle with following through.  I seem to have trouble putting things into practice.\"  Problems solved how to remember to track breathing: \"I don't like post-its\"  Discussed identifying emotions, provided examples  Client is practicing scheduling all his tasks weekly; however, work interfered this past week        Intervention:   CBT: Problem solving; Psychoeducation; Pattern recognition; Assigned and reviewed Homework; Demonstrated Box Breathing  Active listening, clarifying questions          ASSESSMENT: Current Emotional / Mental Status (status of significant symptoms):   Risk status (Self / Other harm or suicidal ideation)   Patient denies current fears or concerns for personal " safety.   Patient denies current or recent suicidal ideation or behaviors.   Patient denies current or recent homicidal ideation or behaviors.   Patient denies current or recent self injurious behavior or ideation.   Patient denies other safety concerns.   Patient reports there has been no change in risk factors since their last session.     Patient reports there has been no change in protective factors since their last session.     Recommended that patient call 911 or go to the local ED should there be a change in any of these risk factors.     Appearance:   Appropriate    Eye Contact:   Good  (online.  He notes his eye contact is poor in person).   Psychomotor Behavior: Normal    Attitude:   Cooperative    Orientation:   All   Speech    Rate / Production: Normal/ Responsive Normal     Volume:  Normal    Mood:    Euthymic   Affect:    Appropriate    Thought Content:  Clear    Thought Form:  Coherent  Logical    Insight:    Good  and Fair      Medication Review:   No changes to current psychiatric medication(s)     Medication Compliance:   Yes     Changes in Health Issues:   Yes: Client has had a headache since Sunday and a stiff neck     Chemical Use Review:   Substance Use: Chemical use reviewed, no active concerns identified      Tobacco Use: No current tobacco use.      Diagnosis:  1. Major depressive disorder, recurrent episode, in partial remission (H)        Collateral Reports Completed:   Not Applicable    PLAN: (Patient Tasks / Therapist Tasks / Other)  Client will be mindful of the pressure in his chest and use Box Breathing to help distinguish between feeling pressure from anxiety and feeling pressure due to a lack of oxygen (from taking to short of breaths).  Client will also continue to create a To Do schedule weekly and track tasks he was able to complete.  Client will report weekly on the percentage of tasks he was able to complete and look for patterns regarding tasks he did not complete.  His next  "scheduled appointment is May 20, 2021.        Farhat Russell                                                         ______________________________________________________________________    Treatment Plan    Patient's Name: Carrillo Aceves  YOB: 1995    Date: 12/16/20; Reviewed and Revised: 5/12/21    DSM5 Diagnoses: 296.35 (F33.41)  Major Depressive Disorder, Recurrent Episode, In partial remission _ and With anxious distress  Psychosocial / Contextual Factors: School  WHODAS: 32 (on 12/3/20)    Referral / Collaboration:  Referral to another professional/service is not indicated at this time.    Anticipated number of session or this episode of care: 30      MeasurableTreatment Goal(s) related to diagnosis / functional impairment(s)  Goal 1: Patient will \"make a schedule for myself and whether or not I'm following that schedule.  I can identify areas I am completing and ones that I'm not.\"      I will know I've met my goal when I am completing scheduled activities on time and I'm scheduling as many activities as I can so I'm more aware of what I am doing every day.  A successful week is completing 50% of his scheduled activities in a week for two weeks.    Objective #A Client will create his weekly schedule on Sundays.   Patient will create a weekly To Do schedule.  Status: New - Date: 12/23/20     Intervention(s)  Therapist will assign homework as appropriate  Check on progress each session and help problem solve barriers to successfully completing this goal      Goal 2: Patient will develop a healthy sleep routine.    I will know I've met my goal when I can fall asleep consistently and I don't have a good reason to have disruptive sleep.  I would like to be able to fall asleep within ten minutes and be able to fall back asleep within ten minutes if I wake up in the middle of the night.      Objective #A Start to develop a sleep schedule    Status: New - Date: 12/23/20     Patient will identify " "healthy sleep hygiene practices.    Intervention(s)  Therapist will assign homework as appropriate  teach about sleep hygiene/routine.    Objective #B  Patient will TBD.    Status: TBD     Intervention(s)  Therapist will TBD.    Goal 3: Patient will \"learn to identify his emotional states.\"   Being able to identify my own emotional states.  At the end of each day record emotions throughout the day that I could identify.  To increase the range of emotions that I can identify.    Objective #A Client will list daily the emotions he was able to identify he felt that day.    Status: New - Date: 5/12/21     Patient will list emotions he felt daily.    Intervention(s)  Therapist will assign homework as appropriate  teach about emotions and how to differentiate  Identify and problem solve barriers to reaching goal.        Patient has not reviewed nor agreed to the above plan.      Farhat Russell  May 20, 2021  "

## 2021-05-26 ENCOUNTER — VIRTUAL VISIT (OUTPATIENT)
Dept: PSYCHOLOGY | Facility: CLINIC | Age: 26
End: 2021-05-26
Payer: COMMERCIAL

## 2021-05-26 DIAGNOSIS — F33.41 MAJOR DEPRESSIVE DISORDER, RECURRENT EPISODE, IN PARTIAL REMISSION (H): Primary | ICD-10-CM

## 2021-05-26 PROCEDURE — 90834 PSYTX W PT 45 MINUTES: CPT | Mod: GT | Performed by: PSYCHOLOGIST

## 2021-05-26 NOTE — PROGRESS NOTES
"                                           Progress Note    Patient Name: Carrillo Aceves  Date: 5/26/21         Service Type: Individual      Session Start Time: 5:00pm Session End Time: 5:52pm     Session Length: 52 minutes     Session #: 22    Attendees: Client attended alone    Service Modality:  Video Visit:      Provider verified identity through the following two step process.  Patient provided:  Patient photo    Telemedicine Visit: The patient's condition can be safely assessed and treated via synchronous audio and visual telemedicine encounter.      Reason for Telemedicine Visit: Services only offered telehealth    Originating Site (Patient Location): Patient's home    Distant Site (Provider Location): Provider Remote Setting    Consent:  The patient/guardian has verbally consented to: the potential risks and benefits of telemedicine (video visit) versus in person care; bill my insurance or make self-payment for services provided; and responsibility for payment of non-covered services.     Patient would like the video invitation sent by: Send to e-mail at: [email or web address]    Mode of Communication:  Video Conference via [employer]    As the provider I attest to compliance with applicable laws and regulations related to telemedicine.     Treatment Plan Last Reviewed: 12/16/20; Reviewed: 5/12/21  PHQ-9 / DORY-7 : 14 / 10 (on 12/3/20); 12 / 9 (on 1/27/21); 10 / 5 (on 5/12/21)    DATA  Interactive Complexity: No  Crisis: No       Progress Since Last Session (Related to Symptoms / Goals / Homework):   Symptoms: Stable: \" I'm ok. \"    Homework: Partially completed      Episode of Care Goals: Minimal progress - ACTION (Actively working towards change); Intervened by reinforcing change plan / affirming steps taken     Current / Ongoing Stressors and Concerns:   Work     Treatment Objective(s) Addressed in This Session:   Client \"forgot to do my homework\" from last week.  He beleives he has been \"more forgetful since " "taking my medications (Effexor).  Explored ways to rememeber to work on goals.  Explored Pros & Cons of reaching goals.  Problem solved.          Notes from 5/5/21 Session:    School starts next Tuesday   \"I feel I've become much more forgetful since taking medications (Effexor).\"   He decided to make an appointment to donate plasma on Saturday   Client forgot to practice breathing exercises   Client forgot to track stress in his chest to distinguish between lack of oxygen and anxiety   \"I really haven't done anything for the past week.\"   Any pros that nothing happened this past week?  \"Nothing bad happen.  I kind of feel stuck in reaching my personal goals.\"   \"Part of it (working on goals) is remembering to do it and mustering up the will to do it.\"  What gets in the way?  \"How much effort it  would take or the effort it would take to start something.\"   How about breaking goals down so it's not so overwhelming?  \"It happens on small tasks.\"   He likes to relax in bed after work \"and then I don't feel like doing anything afterward.\"   Suggested sitting somewhere.  Client thought he could try setting up a space on the floor inside his room or go to the library  occasionally.   Barriers to setting it up?  Indecisiveness on how to set it up.  First step would be searching for a \"floor chair.\"   Client has made a schedule a couple times in the past two weeks but has not updated it after making the initial schedule..   Benefit of scheduling is making sure he is getting things done, and how much time he spends on each tasks.   Pros & Cons of completing tasks on list   Client helps others \"typically do manually stuff.\"   Take ADHD Qs: problem solved, set alarm as a reminder   How does it feel to complete a task:  \"Exercise, I tend to feel good afterward.  Like HW: I might feel relieved, I don't tend to feel a  sense of accomplishment.  Not a lot of positive feelings.\"         Intervention:   CBT: Pros & Cons; Problem " solving; Psychoeducation; Pattern recognition; Assigned and reviewed Homework  Recommended skills for working on goals; Active listening, clarifying questions, other rapport building skills          ASSESSMENT: Current Emotional / Mental Status (status of significant symptoms):   Risk status (Self / Other harm or suicidal ideation)   Patient denies current fears or concerns for personal safety.   Patient denies current or recent suicidal ideation or behaviors.   Patient denies current or recent homicidal ideation or behaviors.   Patient denies current or recent self injurious behavior or ideation.   Patient denies other safety concerns.   Patient reports there has been no change in risk factors since their last session.     Patient reports there has been no change in protective factors since their last session.     Recommended that patient call 911 or go to the local ED should there be a change in any of these risk factors.     Appearance:   Appropriate    Eye Contact:   Good  (online.  He notes his eye contact is poor in person).   Psychomotor Behavior: Normal    Attitude:   Cooperative    Orientation:   All   Speech    Rate / Production: Normal/ Responsive Normal     Volume:  Normal    Mood:    Euthymic   Affect:    Appropriate    Thought Content:  Clear    Thought Form:  Coherent  Logical    Insight:    Good  and Fair      Medication Review:   No changes to current psychiatric medication(s)     Medication Compliance:   Yes     Changes in Health Issues:   Yes: Client has had a headache since Sunday and a stiff neck     Chemical Use Review:   Substance Use: Chemical use reviewed, no active concerns identified      Tobacco Use: No current tobacco use.      Diagnosis:  1. Major depressive disorder, recurrent episode, in partial remission (H)        Collateral Reports Completed:   Not Applicable    PLAN: (Patient Tasks / Therapist Tasks / Other)  Client will set up a place to sit and relax after work instead of laying  "in bed.  This will help keep him more active and likely to work on tasks after work.  He may also complete the ADHD Questionnaires by setting a reminder alarm.  His next scheduled appointment is June 2, 2021.        Farhat Russell                                                         ______________________________________________________________________    Treatment Plan    Patient's Name: Carrillo Aceves  YOB: 1995    Date: 12/16/20; Reviewed and Revised: 5/12/21    DSM5 Diagnoses: 296.35 (F33.41)  Major Depressive Disorder, Recurrent Episode, In partial remission _ and With anxious distress  Psychosocial / Contextual Factors: School  WHODAS: 32 (on 12/3/20)    Referral / Collaboration:  Referral to another professional/service is not indicated at this time.    Anticipated number of session or this episode of care: 30      MeasurableTreatment Goal(s) related to diagnosis / functional impairment(s)  Goal 1: Patient will \"make a schedule for myself and whether or not I'm following that schedule.  I can identify areas I am completing and ones that I'm not.\"      I will know I've met my goal when I am completing scheduled activities on time and I'm scheduling as many activities as I can so I'm more aware of what I am doing every day.  A successful week is completing 50% of his scheduled activities in a week for two weeks.    Objective #A Client will create his weekly schedule on Sundays.   Patient will create a weekly To Do schedule.  Status: New - Date: 12/23/20     Intervention(s)  Therapist will assign homework as appropriate  Check on progress each session and help problem solve barriers to successfully completing this goal      Goal 2: Patient will develop a healthy sleep routine.    I will know I've met my goal when I can fall asleep consistently and I don't have a good reason to have disruptive sleep.  I would like to be able to fall asleep within ten minutes and be able to fall back asleep " "within ten minutes if I wake up in the middle of the night.      Objective #A Start to develop a sleep schedule    Status: New - Date: 12/23/20     Patient will identify healthy sleep hygiene practices.    Intervention(s)  Therapist will assign homework as appropriate  teach about sleep hygiene/routine.    Objective #B  Patient will TBD.    Status: TBD     Intervention(s)  Therapist will TBD.    Goal 3: Patient will \"learn to identify his emotional states.\"   Being able to identify my own emotional states.  At the end of each day record emotions throughout the day that I could identify.  To increase the range of emotions that I can identify.    Objective #A Client will list daily the emotions he was able to identify he felt that day.    Status: New - Date: 5/12/21     Patient will list emotions he felt daily.    Intervention(s)  Therapist will assign homework as appropriate  teach about emotions and how to differentiate  Identify and problem solve barriers to reaching goal.        Patient has not reviewed nor agreed to the above plan.      Farhat Russell  May 26, 2021  "

## 2021-06-02 ENCOUNTER — VIRTUAL VISIT (OUTPATIENT)
Dept: PSYCHOLOGY | Facility: CLINIC | Age: 26
End: 2021-06-02
Payer: COMMERCIAL

## 2021-06-02 DIAGNOSIS — F33.41 MAJOR DEPRESSIVE DISORDER, RECURRENT EPISODE, IN PARTIAL REMISSION (H): Primary | ICD-10-CM

## 2021-06-02 PROCEDURE — 90834 PSYTX W PT 45 MINUTES: CPT | Mod: GT | Performed by: PSYCHOLOGIST

## 2021-06-02 NOTE — PROGRESS NOTES
"                                           Progress Note    Patient Name: Carrillo Aceves  Date: 6/2/21         Service Type: Individual      Session Start Time: 4:00pm Session End Time: 4:52pm     Session Length: 52 minutes     Session #: 23    Attendees: Client attended alone    Service Modality:  Video Visit:      Provider verified identity through the following two step process.  Patient provided:  Patient photo    Telemedicine Visit: The patient's condition can be safely assessed and treated via synchronous audio and visual telemedicine encounter.      Reason for Telemedicine Visit: Services only offered telehealth    Originating Site (Patient Location): Patient's home    Distant Site (Provider Location): Provider Remote Setting    Consent:  The patient/guardian has verbally consented to: the potential risks and benefits of telemedicine (video visit) versus in person care; bill my insurance or make self-payment for services provided; and responsibility for payment of non-covered services.     Patient would like the video invitation sent by: Send to e-mail at: [email or web address]    Mode of Communication:  Video Conference via [employer]    As the provider I attest to compliance with applicable laws and regulations related to telemedicine.     Treatment Plan Last Reviewed: 12/16/20; Reviewed: 5/12/21  PHQ-9 / DORY-7 : 14 / 10 (on 12/3/20); 12 / 9 (on 1/27/21); 10 / 5 (on 5/12/21)    DATA  Interactive Complexity: No  Crisis: No       Progress Since Last Session (Related to Symptoms / Goals / Homework):   Symptoms: Stable: \" I'm pretty stable.  Pretty flat but consistent.  I'd say it's been a good week.  Feeling pretty good. \"    Homework: Partially completed      Episode of Care Goals: Satisfactory progress - ACTION (Actively working towards change); Intervened by reinforcing change plan / affirming steps taken     Current / Ongoing Stressors and Concerns:   Work; School     Treatment Objective(s) Addressed in This " "Session:   Complete and return ADHD Questionnaires as part of an ADHD Assessment; client described and processed difficulties maintaining focus; discussed how it can make school work and meditation difficult  Client also discussed his typical experience when he takes a test for school, \"It's not my usual stress and anxiety.  It's more like my Fight or Flight kicks in.\"          Notes from 6/2/21 Session:    \"I had a good week.  My grandparents were gone for three days.\"  - Took pressure off.   He spent more time thinking about his middle-school years \"since I was working on the ADHD Questionnaires.\"   \"The other thing I wanted to talk about:  I tried to do some self-study, to entertain myself and as a goal of mine, I want to work  through some math books of mine.  I kind of realized I have a lot of difficulty keeping my mind on one problem.  I had attributed to anxiety and stress, neither one that I'm feeling right now.  I don't know if I'm ever been really able to focus on something that I didn't know what to do or what my next step will be.  I can't sustain my attention on it.\"  His mind drifts off to think about \"something dreamy.  Maybe a dream I had the other night or some kind of streaming of thoughts.  It's usually not verbalized thinking about the future or external objects or people.  It's usually revisiting a thought I had earlier. \"   Particular thoughts?:  \" It's usually thoughts that occurred when I was alone and my mind was wondering off, kind of like when my mind was in a same kind of state. Sometimes it's songs.  It's usually not emotionally charged unless I'm doing something physical by myself, I tend to be a little more angry (yard work). Anger tends to surface when I'm doing yard work and I'm zoning out.  It can be a story that I tell myself to entertain myself.  It could be based on a dream or a something that happened to me.\"    This can also make it difficult to meditate or do deep breathing. - " "processed and evaluated \"purpose =: of focusing on the mechanics of breathing.    Pattern that he gets frustrated quickly and will move on from the task in a short time and may not get back to it.  I I try to do something step-by-step I can't do it.  I can't just consciously break a problem down or try different approaches.\"  If stuck on a problem he may just kind of \"trace\" what he has written: What are you feeling physically?  \"My feelings get kind of cut off.  Even leading up to the test, I can't hold thoughts in my head.  If I try to think about something that may be on the test and depend my thoughts about it I won't be able to.\"  Anxiety?  \"It's like anxiety but it feels different than like 'worry' anxiety.  It's like a fight or flight response.\"    Gets bored easily.    HW:  Still working on last week's homework.  Track what makes his last class more difficult.         Intervention:   CBT: Psychoeducation; Problem solving; Pattern recognition; Assigned and reviewed Homework  Asked exploratory questions, active listening and other rapport building skills; encouraged client to complete the ADHD Questionnaires          ASSESSMENT: Current Emotional / Mental Status (status of significant symptoms):   Risk status (Self / Other harm or suicidal ideation)   Patient denies current fears or concerns for personal safety.   Patient denies current or recent suicidal ideation or behaviors.   Patient denies current or recent homicidal ideation or behaviors.   Patient denies current or recent self injurious behavior or ideation.   Patient denies other safety concerns.   Patient reports there has been no change in risk factors since their last session.     Patient reports there has been no change in protective factors since their last session.     Recommended that patient call 911 or go to the local ED should there be a change in any of these risk factors.     Appearance:   Appropriate    Eye Contact:   Good  (online.  He " notes his eye contact is poor in person).   Psychomotor Behavior: Normal    Attitude:   Cooperative    Orientation:   All   Speech    Rate / Production: Normal/ Responsive Normal     Volume:  Normal    Mood:    Euthymic   Affect:    Appropriate    Thought Content:  Clear    Thought Form:  Coherent  Logical    Insight:    Good  and Fair      Medication Review:   No changes to current psychiatric medication(s)     Medication Compliance:   Yes     Changes in Health Issues:   Yes: Client has had a headache since Sunday and a stiff neck     Chemical Use Review:   Substance Use: Chemical use reviewed, no active concerns identified      Tobacco Use: No current tobacco use.      Diagnosis:  1. Major depressive disorder, recurrent episode, in partial remission (H)        Collateral Reports Completed:   Not Applicable    PLAN: (Patient Tasks / Therapist Tasks / Other)  Client ordered a floor desk as part of last week's homework.  This week, he will write down what has made his upcoming college class difficult for him to complete in the past (he has dropped the class, his last class before moving on in school, 2-3 times).  He may also complete and return the ADHD Questionnaire.  His next scheduled appointment is June 24, 2021.        Farhat Russell                                                         ______________________________________________________________________    Treatment Plan    Patient's Name: Carrillo Aceves  YOB: 1995    Date: 12/16/20; Reviewed and Revised: 5/12/21    DSM5 Diagnoses: 296.35 (F33.41)  Major Depressive Disorder, Recurrent Episode, In partial remission _ and With anxious distress  Psychosocial / Contextual Factors: School  WHODAS: 32 (on 12/3/20)    Referral / Collaboration:  Referral to another professional/service is not indicated at this time.    Anticipated number of session or this episode of care: 30      MeasurableTreatment Goal(s) related to diagnosis / functional  "impairment(s)  Goal 1: Patient will \"make a schedule for myself and whether or not I'm following that schedule.  I can identify areas I am completing and ones that I'm not.\"      I will know I've met my goal when I am completing scheduled activities on time and I'm scheduling as many activities as I can so I'm more aware of what I am doing every day.  A successful week is completing 50% of his scheduled activities in a week for two weeks.    Objective #A Client will create his weekly schedule on Sundays.   Patient will create a weekly To Do schedule.  Status: New - Date: 12/23/20     Intervention(s)  Therapist will assign homework as appropriate  Check on progress each session and help problem solve barriers to successfully completing this goal      Goal 2: Patient will develop a healthy sleep routine.    I will know I've met my goal when I can fall asleep consistently and I don't have a good reason to have disruptive sleep.  I would like to be able to fall asleep within ten minutes and be able to fall back asleep within ten minutes if I wake up in the middle of the night.      Objective #A Start to develop a sleep schedule    Status: New - Date: 12/23/20     Patient will identify healthy sleep hygiene practices.    Intervention(s)  Therapist will assign homework as appropriate  teach about sleep hygiene/routine.    Objective #B  Patient will TBD.    Status: TBD     Intervention(s)  Therapist will TBD.    Goal 3: Patient will \"learn to identify his emotional states.\"   Being able to identify my own emotional states.  At the end of each day record emotions throughout the day that I could identify.  To increase the range of emotions that I can identify.    Objective #A Client will list daily the emotions he was able to identify he felt that day.    Status: New - Date: 5/12/21     Patient will list emotions he felt daily.    Intervention(s)  Therapist will assign homework as appropriate  teach about emotions and how to " differentiate  Identify and problem solve barriers to reaching goal.        Patient has not reviewed nor agreed to the above plan.      Farhat Russell  June 2, 2021

## 2021-06-24 ENCOUNTER — VIRTUAL VISIT (OUTPATIENT)
Dept: PSYCHOLOGY | Facility: CLINIC | Age: 26
End: 2021-06-24
Payer: COMMERCIAL

## 2021-06-24 DIAGNOSIS — F33.41 MAJOR DEPRESSIVE DISORDER, RECURRENT EPISODE, IN PARTIAL REMISSION (H): Primary | ICD-10-CM

## 2021-06-24 PROCEDURE — 90834 PSYTX W PT 45 MINUTES: CPT | Mod: GT | Performed by: PSYCHOLOGIST

## 2021-06-24 NOTE — PROGRESS NOTES
"                                           Progress Note    Patient Name: Carrillo Aceves  Date: 6/24/21         Service Type: Individual      Session Start Time: 4:00pm Session End Time: 4:52pm     Session Length: 52 minutes     Session #: 24    Attendees: Client attended alone    Service Modality:  Video Visit:      Provider verified identity through the following two step process.  Patient provided:  Patient photo    Telemedicine Visit: The patient's condition can be safely assessed and treated via synchronous audio and visual telemedicine encounter.      Reason for Telemedicine Visit: Services only offered telehealth    Originating Site (Patient Location): Patient's home    Distant Site (Provider Location): Provider Remote Setting    Consent:  The patient/guardian has verbally consented to: the potential risks and benefits of telemedicine (video visit) versus in person care; bill my insurance or make self-payment for services provided; and responsibility for payment of non-covered services.     Patient would like the video invitation sent by: Send to e-mail at: [email or web address]    Mode of Communication:  Video Conference via [employer]    As the provider I attest to compliance with applicable laws and regulations related to telemedicine.     Treatment Plan Last Reviewed: 12/16/20; Reviewed: 5/12/21  PHQ-9 / DORY-7 : 14 / 10 (on 12/3/20); 12 / 9 (on 1/27/21); 10 / 5 (on 5/12/21)    DATA  Interactive Complexity: No  Crisis: No       Progress Since Last Session (Related to Symptoms / Goals / Homework):   Symptoms: Stable: \" I've noticed more feelings of depression.  I've noticed a harder time getting out of bed.  Yesterday was the first time I've noticed an emotional component to depression. \"    Homework: Partially completed      Episode of Care Goals: Satisfactory progress - ACTION (Actively working towards change); Intervened by reinforcing change plan / affirming steps taken     Current / Ongoing Stressors and " "Concerns:   Work; School     Treatment Objective(s) Addressed in This Session:   Reviewed with client, and sent email via [employer], pages that were missing from his returned ADHD Assessment; also discussed there will be additional charges for the assessment which client approved  Increase interest, engagement, and pleasure in doing things  Discussed what may be contributing to client's increased depressive symptoms; what has been working in therapy and what he would like to focus on more  Client talked about barriers to doing homework          Notes from 6/24/21 Session:    \" I've noticed more feelings of depression.  I've noticed a harder time getting out of bed.  Yesterday was the first  time I've noticed an emotional component to depression. \"   Triggers: \"That I've haven't been able to consistently meet my goals.\"   Consistently?  He feels he is making good progress emotionally \"but not in changing my behaviors.  There better  but I don't feel it's been me managing the change.\"     The biggest change he would like to work on is studying.  His last class just started and his first assignment is due  this Sunday, \"It's not a large assignment.\"     HW: What makes this class so much more difficult to finish?  \"Part of it, I think, is it is not a prerequisite for  anything else.  This one is the second English class out of two.\"  The class involves writing a lot about research and opinions which client avoids.  He has not had much negative feedback on any of his papers, \"but I don't like reading my own writing.\"    Discussed \"feeling\" vs \"fact\"; particularly in regards to his writing, \"I feel like my writings (emails) are klunky.\"  He gets frustrated trying to reword his writing.  What feelings and thoughts do you experience when you try to edit what you write to people?  \"It's hard to answer.  It's probably been 10 years since I've rewritten anything.  It's probably more helpful for me to just delete the whole thing " "and start over.\"    He does well providing feedback to others on their paper(s).    HW: \"I would like to have a consistent disciplined approach to homework (class).\"   What creates, and what lifts, barriers to doing homework. \"If it's something I want to do well on it feels overwhelming.  I kind of feel paralyzed.\"  Anxiety?  \"Yea.  I think that's pretty accurate.\"  How have you overcome it in the past (paralysis)?  \"Not doing well hits my self-esteem.\"  To do well in a class he is \"overwhelmed\" all semester?    HW: Plan out his study schedule for the week early and review daily.  Challenge any rationalization to put the homework off.  Return missing ADHD paperwork.         Intervention:   CBT: Reviewed progress in therapy and areas to focus on; Psychoeducation; Problem solving; Pattern recognition; Assigned and reviewed Homework  Asked exploratory questions, reviewed ADHD paperwork client completed; asked insight oriented questions; active listening and other rapport building skills; encouraged client to complete the ADHD Questionnaires          ASSESSMENT: Current Emotional / Mental Status (status of significant symptoms):   Risk status (Self / Other harm or suicidal ideation)   Patient denies current fears or concerns for personal safety.   Patient denies current or recent suicidal ideation or behaviors.   Patient denies current or recent homicidal ideation or behaviors.   Patient denies current or recent self injurious behavior or ideation.   Patient denies other safety concerns.   Patient reports there has been no change in risk factors since their last session.     Patient reports there has been no change in protective factors since their last session.     Recommended that patient call 911 or go to the local ED should there be a change in any of these risk factors.     Appearance:   Appropriate    Eye Contact:   Good  (online.  He notes his eye contact is poor in person).   Psychomotor Behavior: Normal " "   Attitude:   Cooperative    Orientation:   All   Speech    Rate / Production: Normal/ Responsive Normal     Volume:  Normal    Mood:    Euthymic   Affect:    Appropriate    Thought Content:  Clear    Thought Form:  Coherent  Logical    Insight:    Good  and Fair      Medication Review:   No changes to current psychiatric medication(s)     Medication Compliance:   Yes     Changes in Health Issues:   Yes: Client has had a headache since Sunday and a stiff neck     Chemical Use Review:   Substance Use: Chemical use reviewed, no active concerns identified      Tobacco Use: No current tobacco use.      Diagnosis:  1. Major depressive disorder, recurrent episode, in partial remission (H)        Collateral Reports Completed:   Not Applicable    PLAN: (Patient Tasks / Therapist Tasks / Other)  Client will schedule days to do his upcoming homework for Sunday's assignment.  He will review his plans each day to \"ingrain it deeper\" into his thought process and will challenge any rationalizations as to why he can put it off until later.  He will also send in the missing pages from his ADHD Questionnaires.  His next scheduled appointment is June 30, 2021.        Farhat Russell                                                         ______________________________________________________________________    Treatment Plan    Patient's Name: Carrillo Aceves  YOB: 1995    Date: 12/16/20; Reviewed and Revised: 5/12/21    DSM5 Diagnoses: 296.35 (F33.41)  Major Depressive Disorder, Recurrent Episode, In partial remission _ and With anxious distress  Psychosocial / Contextual Factors: School  WHODAS: 32 (on 12/3/20)    Referral / Collaboration:  Referral to another professional/service is not indicated at this time.    Anticipated number of session or this episode of care: 30      MeasurableTreatment Goal(s) related to diagnosis / functional impairment(s)  Goal 1: Patient will \"make a schedule for myself and whether or " "not I'm following that schedule.  I can identify areas I am completing and ones that I'm not.\"      I will know I've met my goal when I am completing scheduled activities on time and I'm scheduling as many activities as I can so I'm more aware of what I am doing every day.  A successful week is completing 50% of his scheduled activities in a week for two weeks.    Objective #A Client will create his weekly schedule on Sundays.   Patient will create a weekly To Do schedule.  Status: New - Date: 12/23/20     Intervention(s)  Therapist will assign homework as appropriate  Check on progress each session and help problem solve barriers to successfully completing this goal      Goal 2: Patient will develop a healthy sleep routine.    I will know I've met my goal when I can fall asleep consistently and I don't have a good reason to have disruptive sleep.  I would like to be able to fall asleep within ten minutes and be able to fall back asleep within ten minutes if I wake up in the middle of the night.      Objective #A Start to develop a sleep schedule    Status: New - Date: 12/23/20     Patient will identify healthy sleep hygiene practices.    Intervention(s)  Therapist will assign homework as appropriate  teach about sleep hygiene/routine.    Objective #B  Patient will TBD.    Status: TBD     Intervention(s)  Therapist will TBD.    Goal 3: Patient will \"learn to identify his emotional states.\"   Being able to identify my own emotional states.  At the end of each day record emotions throughout the day that I could identify.  To increase the range of emotions that I can identify.    Objective #A Client will list daily the emotions he was able to identify he felt that day.    Status: New - Date: 5/12/21     Patient will list emotions he felt daily.    Intervention(s)  Therapist will assign homework as appropriate  teach about emotions and how to differentiate  Identify and problem solve barriers to reaching " goal.        Patient has not reviewed nor agreed to the above plan.      Farhat Russell  June 24, 2021

## 2021-06-30 ENCOUNTER — VIRTUAL VISIT (OUTPATIENT)
Dept: PSYCHOLOGY | Facility: CLINIC | Age: 26
End: 2021-06-30
Payer: COMMERCIAL

## 2021-06-30 DIAGNOSIS — F33.41 MAJOR DEPRESSIVE DISORDER, RECURRENT EPISODE, IN PARTIAL REMISSION (H): Primary | ICD-10-CM

## 2021-06-30 PROCEDURE — 90834 PSYTX W PT 45 MINUTES: CPT | Mod: GT | Performed by: PSYCHOLOGIST

## 2021-06-30 NOTE — PROGRESS NOTES
Progress Note    Patient Name: Carrillo Aceves  Date: 6/30/21         Service Type: Individual      Session Start Time: 4:01pm Session End Time: 4:53pm     Session Length: 52 minutes     Session #: 25    Attendees: Client attended alone    Service Modality:  Video Visit:      Provider verified identity through the following two step process.  Patient provided:  Patient photo    Telemedicine Visit: The patient's condition can be safely assessed and treated via synchronous audio and visual telemedicine encounter.      Reason for Telemedicine Visit: Services only offered telehealth    Originating Site (Patient Location): Patient's home    Distant Site (Provider Location): Provider Remote Setting    Consent:  The patient/guardian has verbally consented to: the potential risks and benefits of telemedicine (video visit) versus in person care; bill my insurance or make self-payment for services provided; and responsibility for payment of non-covered services.     Patient would like the video invitation sent by: Send to e-mail at: [email or web address]    Mode of Communication:  Video Conference via [employer]    As the provider I attest to compliance with applicable laws and regulations related to telemedicine.     Treatment Plan Last Reviewed: 12/16/20; Reviewed: 5/12/21  PHQ-9 / DORY-7 : 14 / 10 (on 12/3/20); 12 / 9 (on 1/27/21); 10 / 5 (on 5/12/21)    DATA  Interactive Complexity: No  Crisis: No       Progress Since Last Session (Related to Symptoms / Goals / Homework):   Symptoms: Stable:    Homework: Partially completed      Episode of Care Goals: Satisfactory progress - ACTION (Actively working towards change); Intervened by reinforcing change plan / affirming steps taken     Current / Ongoing Stressors and Concerns:   Work; School     Treatment Objective(s) Addressed in This Session:   use daily planner 100% (this semester)% of the time  Review homework; discuss possible skills used  "to complete last week's school work; discussed difficulties with previous full-time classes; followed-up ADHD paperwork; discussed symptoms; assign new homework          Notes from 6/24/21 Session:    \"I did get my two minor papers done by Sunday.  I didn't schedule them as  planned.  I worked on some on Friday and finished it on Sunday.\"   His next paper is a \"four page paper and I'm probably going to have to spread  this one out a bit more.\"   He can start working on his homework assignment now due in two weeks.   Client does not record on a planner what is due when for his classes.   Suggested he record due dates on paper - he will use his phone planner   Had client rate, 1-10, his feeling of going to school (\"Slighly preferable to  working.  I'd put myself at a 6-7 (10 being most positive).\"   Sunday he socialized with his mom and uncle   Client feels his meds may work well   Also feels he may be getting comfortable now with going to school   Also feels he may be more comfortable with himself as he gains insight from  sessions.   \"I think I'm, overall, less concerned with what people think then I used to be.\"   Taught about the concept of \"good enough.\"   \"I like holding myself to really high standards.  I think that comes from seeing  myself as capable as doing a lot more than I've done at this point. I don't feel  I've ever been held back by comprehension or difficulty.  It's always been  struggling to do simple things.\"   Client sent in the missing ADHD paperwork   Will go over results next session.        Intervention:   CBT: Reviewed homework from last week, assigned new homework for this week; Psychoeducation; Problem solving; Pattern recognition; Assigned and reviewed Homework; Reviewed symptoms of ADHD  Active listening and other rapport building skills        ASSESSMENT: Current Emotional / Mental Status (status of significant symptoms):   Risk status (Self / Other harm or suicidal ideation)   Patient " denies current fears or concerns for personal safety.   Patient denies current or recent suicidal ideation or behaviors.   Patient denies current or recent homicidal ideation or behaviors.   Patient denies current or recent self injurious behavior or ideation.   Patient denies other safety concerns.   Patient reports there has been no change in risk factors since their last session.     Patient reports there has been no change in protective factors since their last session.     Recommended that patient call 911 or go to the local ED should there be a change in any of these risk factors.     Appearance:   Appropriate    Eye Contact:   Good  (online.  He notes his eye contact is poor in person).   Psychomotor Behavior: Normal    Attitude:   Cooperative    Orientation:   All   Speech    Rate / Production: Normal/ Responsive Normal     Volume:  Normal    Mood:    Normal   Affect:    Appropriate    Thought Content:  Clear    Thought Form:  Coherent  Logical    Insight:    Fair      Medication Review:   Changes to psychiatric medications, see updated Medication List in EPIC.      Medication Compliance:   Yes     Changes in Health Issues:   None reported     Chemical Use Review:   Substance Use: Chemical use reviewed, no active concerns identified      Tobacco Use: No current tobacco use.      Diagnosis:  1. Major depressive disorder, recurrent episode, in partial remission (H)        Collateral Reports Completed:   Not Applicable    PLAN: (Patient Tasks / Therapist Tasks / Other)  Client will enter all his assignments that are due for his current class into his phone planner.  His next scheduled appointment is July 7, 2021.        Farhat Russell                                                         ______________________________________________________________________    Treatment Plan    Patient's Name: Carrillo Aceves  YOB: 1995    Date: 12/16/20; Reviewed and Revised: 5/12/21    DSM5 Diagnoses: 296.35  "(F33.41)  Major Depressive Disorder, Recurrent Episode, In partial remission _ and With anxious distress  Psychosocial / Contextual Factors: School  WHODAS: 32 (on 12/3/20)    Referral / Collaboration:  Referral to another professional/service is not indicated at this time.    Anticipated number of session or this episode of care: 30      MeasurableTreatment Goal(s) related to diagnosis / functional impairment(s)  Goal 1: Patient will \"make a schedule for myself and whether or not I'm following that schedule.  I can identify areas I am completing and ones that I'm not.\"      I will know I've met my goal when I am completing scheduled activities on time and I'm scheduling as many activities as I can so I'm more aware of what I am doing every day.  A successful week is completing 50% of his scheduled activities in a week for two weeks.    Objective #A Client will create his weekly schedule on Sundays.   Patient will create a weekly To Do schedule.  Status: New - Date: 12/23/20     Intervention(s)  Therapist will assign homework as appropriate  Check on progress each session and help problem solve barriers to successfully completing this goal      Goal 2: Patient will develop a healthy sleep routine.    I will know I've met my goal when I can fall asleep consistently and I don't have a good reason to have disruptive sleep.  I would like to be able to fall asleep within ten minutes and be able to fall back asleep within ten minutes if I wake up in the middle of the night.      Objective #A Start to develop a sleep schedule    Status: New - Date: 12/23/20     Patient will identify healthy sleep hygiene practices.    Intervention(s)  Therapist will assign homework as appropriate  teach about sleep hygiene/routine.    Objective #B  Patient will TBD.    Status: TBD     Intervention(s)  Therapist will TBD.    Goal 3: Patient will \"learn to identify his emotional states.\"   Being able to identify my own emotional states.  At " the end of each day record emotions throughout the day that I could identify.  To increase the range of emotions that I can identify.    Objective #A Client will list daily the emotions he was able to identify he felt that day.    Status: New - Date: 5/12/21     Patient will list emotions he felt daily.    Intervention(s)  Therapist will assign homework as appropriate  teach about emotions and how to differentiate  Identify and problem solve barriers to reaching goal.        Patient has not reviewed nor agreed to the above plan.      Farhat Russell  June 30, 2021

## 2021-07-07 ENCOUNTER — VIRTUAL VISIT (OUTPATIENT)
Dept: PSYCHOLOGY | Facility: CLINIC | Age: 26
End: 2021-07-07
Payer: COMMERCIAL

## 2021-07-07 DIAGNOSIS — F33.41 MAJOR DEPRESSIVE DISORDER, RECURRENT EPISODE, IN PARTIAL REMISSION (H): ICD-10-CM

## 2021-07-07 DIAGNOSIS — F90.0 ATTENTION DEFICIT HYPERACTIVITY DISORDER, INATTENTIVE TYPE: Primary | ICD-10-CM

## 2021-07-07 PROCEDURE — 96131 PSYCL TST EVAL PHYS/QHP EA: CPT | Mod: GT | Performed by: PSYCHOLOGIST

## 2021-07-07 PROCEDURE — 96130 PSYCL TST EVAL PHYS/QHP 1ST: CPT | Mod: GT | Performed by: PSYCHOLOGIST

## 2021-07-07 NOTE — PROGRESS NOTES
Progress Note: ADHD Assessment Results    Patient Name: Carrillo Aceves  Date: 7/7/21         Service Type: Individual      Session #: 26    Attendees: Client attended alone    Service Modality:  Video Visit:      Provider verified identity through the following two step process.  Patient provided:  Patient photo    Telemedicine Visit: The patient's condition can be safely assessed and treated via synchronous audio and visual telemedicine encounter.      Reason for Telemedicine Visit: Services only offered telehealth    Originating Site (Patient Location): Patient's home    Distant Site (Provider Location): Provider Remote Setting    Consent:  The patient/guardian has verbally consented to: the potential risks and benefits of telemedicine (video visit) versus in person care; bill my insurance or make self-payment for services provided; and responsibility for payment of non-covered services.     Patient would like the video invitation sent by: Send to e-mail at: [email or web address]    Mode of Communication:  Video Conference via [employer]    As the provider I attest to compliance with applicable laws and regulations related to telemedicine.     Treatment Plan Last Reviewed: 12/16/20; Reviewed: 5/12/21  PHQ-9 / DORY-7 : 14 / 10 (on 12/3/20); 12 / 9 (on 1/27/21); 10 / 5 (on 5/12/21)    DATA  Interactive Complexity: No  Crisis: No       Progress Since Last Session (Related to Symptoms / Goals / Homework):   Symptoms: Stable:    Homework: Partially completed      Episode of Care Goals: Satisfactory progress - ACTION (Actively working towards change); Intervened by reinforcing change plan / affirming steps taken     Current / Ongoing Stressors and Concerns:   Work; School     Treatment Objective(s) Addressed in This Session:   Provided feedback based on the results of the client's ADHD assessment questionnaires, answered questions  Review homework; assign new homework        Notes from  "7/7/21 Session:    \"I missed my deadline for the research proposal (homework).\"   Described his thought process once he realized it was due the day he remembered.   He identified Negative self-defeating self-talk.  Processed.   He described getting stuck in a thought process of initiating the project but he gets stuck in a thought process of whether to start the project or not and runs out of time.   He describes procrastination isn't intentional, \"I want to get the work done but can't get myself to start.\"   A barrier to starting the project is a \"strong urge or feeling I don't want to do it.\".....Because?  \"I'm not sure how to elaborate on that.  I don't like the way thinking about doing it makes me feel.\"  Which is how?  \"I can't really pin down  an emotion that would be similar.\"  That feeling comes up with household chores...basically anything I don't want to do or I would not classify as leisure.\"         Intervention:   CBT: Reviewed homework from last week, processed difficulties; Assigned new homework; Psychoeducation; Pattern recognition  Provided feedback regarding client's ADHD assessment, made recommendations based on assessment, answered questions; Rapport building skills        ASSESSMENT: Current Emotional / Mental Status (status of significant symptoms):   Risk status (Self / Other harm or suicidal ideation)   Patient denies current fears or concerns for personal safety.   Patient denies current or recent suicidal ideation or behaviors.   Patient denies current or recent homicidal ideation or behaviors.   Patient denies current or recent self injurious behavior or ideation.   Patient denies other safety concerns.   Patient reports there has been no change in risk factors since their last session.     Patient reports there has been no change in protective factors since their last session.     Recommended that patient call 911 or go to the local ED should there be a change in any of these risk " "factors.     Appearance:   Appropriate    Eye Contact:   Good    Psychomotor Behavior: Normal    Attitude:   Cooperative    Orientation:   All   Speech    Rate / Production: Normal/ Responsive Normal     Volume:  Normal    Mood:    Normal   Affect:    Appropriate    Thought Content:  Clear    Thought Form:  Coherent  Logical    Insight:    Fair      Medication Review:   Changes to psychiatric medications, see updated Medication List in EPIC.      Medication Compliance:   Yes     Changes in Health Issues:   None reported     Chemical Use Review:   Substance Use: Chemical use reviewed, no active concerns identified      Tobacco Use: No current tobacco use.      Diagnosis:  1. Attention-Deficit/Hyperactivity Disorder, Predominantly inattentive presentation    2. Major depressive disorder, recurrent episode, in partial remission (H)        Collateral Reports Completed:   Not Applicable    PLAN: (Patient Tasks / Therapist Tasks / Other)  Client will contact his prescribing mental health provider to discuss options regarding treatment of ADHD with medications.  His next scheduled appointment is July 14, 2021.        Farhat Russell                                                         ______________________________________________________________________    Treatment Plan    Patient's Name: Carrillo Aceves  YOB: 1995    Date: 12/16/20; Reviewed and Revised: 5/12/21    DSM5 Diagnoses: 296.35 (F33.41)  Major Depressive Disorder, Recurrent Episode, In partial remission _ and With anxious distress  Psychosocial / Contextual Factors: School  WHODAS: 32 (on 12/3/20)    Referral / Collaboration:  Referral to another professional/service is not indicated at this time.    Anticipated number of session or this episode of care: 30      MeasurableTreatment Goal(s) related to diagnosis / functional impairment(s)  Goal 1: Patient will \"make a schedule for myself and whether or not I'm following that schedule.  I can " "identify areas I am completing and ones that I'm not.\"      I will know I've met my goal when I am completing scheduled activities on time and I'm scheduling as many activities as I can so I'm more aware of what I am doing every day.  A successful week is completing 50% of his scheduled activities in a week for two weeks.    Objective #A Client will create his weekly schedule on Sundays.   Patient will create a weekly To Do schedule.  Status: New - Date: 12/23/20     Intervention(s)  Therapist will assign homework as appropriate  Check on progress each session and help problem solve barriers to successfully completing this goal      Goal 2: Patient will develop a healthy sleep routine.    I will know I've met my goal when I can fall asleep consistently and I don't have a good reason to have disruptive sleep.  I would like to be able to fall asleep within ten minutes and be able to fall back asleep within ten minutes if I wake up in the middle of the night.      Objective #A Start to develop a sleep schedule    Status: New - Date: 12/23/20     Patient will identify healthy sleep hygiene practices.    Intervention(s)  Therapist will assign homework as appropriate  teach about sleep hygiene/routine.    Objective #B  Patient will TBD.    Status: TBD     Intervention(s)  Therapist will TBD.    Goal 3: Patient will \"learn to identify his emotional states.\"   Being able to identify my own emotional states.  At the end of each day record emotions throughout the day that I could identify.  To increase the range of emotions that I can identify.    Objective #A Client will list daily the emotions he was able to identify he felt that day.    Status: New - Date: 5/12/21     Patient will list emotions he felt daily.    Intervention(s)  Therapist will assign homework as appropriate  teach about emotions and how to differentiate  Identify and problem solve barriers to reaching goal.        Patient has not reviewed nor agreed to the " "above plan.      Farhat Lopez [place]  July 7, 2021        Provider Name:  Farhat Eva Russell     Credentials:  LP      ADHD Assessment Results       Patient: Carrillo Aceves  YOB: 1995  MRN: [id number]  Date(s) of assessment: 7/7/21    Information about appointment:  Client attended two  sessions to aid in determining client's mental health diagnosis or diagnoses and treatment recommendations that best address client concerns. Client records includingmedical were reviewed. A diagnostic assessment was conducted at the initial appointment. Client completed several rating scales to assist in assessing attention-related and other mental health symptoms that may be causing impairments in functioning. Rating scales were also completed by a collateral contact.    Assessment tools:      Rex Adult ADHD Rating Scale-IV: Self and Other Reports (BAARS-IV), Rex Functional Impairment Scale: Self and Other Reports (BFIS), Rex Deficits in Executive Functioning Scale: Self and Other Reports (BDEFS), Patient Health Questionnaire-9 (PHQ-9) and Generalized Anxiety Disorder-7 (DORY-7)    Assessment Results:    Rex Adult ADHD Rating Scale-IV: Self and Other Reports (BAARS-IV)  The BAARS-IV assesses for symptoms of ADHD that are experienced in one's daily life. This assessment measure includes self and collateral rating scales designed to provide information regarding current and childhood symptoms of ADHD including inattention, hyperactivity, and impulsivity. Self-report scores are reported as percentiles. Scores at the 76th-83rd percentile are considered marginal, scores at the 84th-92nd percentile are considered borderline, scores at the 93rd-95th percentile are considered mild, scores at the 96th-98th percentile are considered moderate, and those at the 99th percentile are considered severe. Collateral or \"other\" rating scales are reported as number of symptoms observed in comparison to those reported by the " client. Norms and percentile scores are not available for collateral reports.     Current Symptoms Scale--Self Report:   Client completed the self-report inventory of current symptoms. The results indicate that the client's Total ADHD Score was 51 which places him in the 98th percentile for overall ADHD symptoms. In addition, the client endorsed 8/9 (98th percentile) Inattention symptoms, 2/5 (88th percentile) Hyperactivity symptoms, 1/4 (80th percentile) Impulsivity symptoms, and 6/9 (96th percentile) Sluggish Cognitive Tempo symptoms. Client indicated that the reported symptoms have resulted in impaired functioning in school, home and work. Overall, the results suggest the client is expeiencing Moderate ADHD symptoms.    Current Symptoms Scale--Other Report:  Client's sister and mother completed collaterals report inventory of current symptoms. Based on the collateral contact's observation of symptoms (sister's and mother's, respectively), the client demonstrates (4, 6)/9 Inattention symptoms, (2, 1)/5 Hyperactivity symptoms, (0, 0)/4 Impulsivity symptoms, and (0, 7)/9 Sluggish Cognitive Tempo symptoms. The client's Total ADHD Score was (38, 44). The collateral contact indicated the client demonstrates impaired functioning in school, home, work, social relationships and (both collateral reporters)} Both the collateral- and self-report scores are not significantly different.    Childhood Symptoms Scale--Self-Report:  Client completed the self-report inventory of childhood symptoms. The results indicate that the client's Total ADHD Score was 54 which places him in the 97th percentile for overall ADHD symptoms in childhood. In addition, the client endorsed 9/9 (99th+ percentile) Inattention symptoms and  4/9 (89th percentile) Hyperactivity-Impulsivity symptoms. Client indicated that the reported symptoms resulted in impaired functioning in school, home and social relationships Overall, the results suggest the client  "experienced  Moderate symptoms of ADHD as a child.     Childhood Symptoms Scale--Other Report:  Client's sister and mother completed the collateral report inventory of childhood symptoms. Based on the collateral contact's recollection of client's childhood symptoms (sister's and mother's, respectively), the client demonstrated (5, 9)/9 Inattention symptoms and (3, 1)/9 Hyperactivity-Impulsivity symptoms. The client's Total ADHD Score was (8, 10). The collateral contact indicated the client demonstrates impaired functioning in school, home, social relationships and (both collateral reporters).  Both collateral- and self-report scores are not significantly different.                          Rex Functional Impairment Scale: Self and Other Reports (BFIS)  The BFIS is used to assess an individuals' psychosocial impairment in major life/daily activities that may be due to a mental health disorder. This assessment measure includes self and collateral rating scales. Self-report scores are reported as percentiles. Scores at the 76th-83rd percentile are considered marginal, scores at the 84th-92nd percentile are considered borderline, scores at the 93rd-95th percentile are considered mild, scores at the 96th-98th percentile are considered moderate, and those at the 99th percentile are considered severe.Collateral or \"other\" rating scales are reported as number of symptoms observed in comparison to those reported by the client. Norms and percentile scores are not available for collateral reports.     Results indicate the client identified impairment (scores at or greater than 93rd percentile) in the following areas: home-chores, work, education, money management, daily responsibilities, self-care routines and health maintenance The client's Mean Impairment Score was 6.5 (96th percentile) indicating the client is reporting Moderate impairment in functioning across domains. Client's sister and mother completed the " "collateral rating scale, which indicated similar results. The collateral contact's scores were generally lower than the client's report.     Rex Deficits in Executive Functioning Scale (BDEFS)  The BDEFS is a measure used for evaluating dimensions of adult executive functioning in daily life.This assessment measure includes self and collateral rating scales. Self-report scores are reported as percentiles. Scores at the 76th-83rd percentile are considered marginal, scores at the 84th-92nd percentile are considered borderline, scores at the 93rd-95th percentile are considered mild, scores at the 96th-98th percentile are considered moderate, and those at the 99th percentile are considered severe.Collateral or \"other\" rating scales are reported as number of symptoms observed in comparison to those reported by the client. Norms and percentile scores are not available for collateral reports.     Results indicate the client's Total Executive Functioning Score was 262  (99th + percentile). The ADHD-Executive Functioning Index score was 30 (97th percentile). These scores suggest the client has Moderate to Severe deficits in executive functioning. These deficits are likely to be due to ADHD. Results indicate the client identified significant deficits in the following areas: self-management to time 99th + percentile , self-organization/problem-solving 96th percentile, self-motivation 99th + percentile and self-regulation of emotions 98th percentile. Client's sister and mother completed the collateral rating scale, which indicated similar results. The collateral contact's scores were generally lower compared to his sister's and the same compared to his mother's report.      Generalized Anxiety Disorder Questionnaire (DORY-7)  This questionnaire is designed to assess for anxiety in adults.  Based on the score, he is experiencing mild symptoms of anxiety. Client identified the following symptoms of anxiety: feeling on " edge/nervous/anxious, trouble relaxing, being restless and becoming easily annoyed or irritable    Patient Health Questionnaire- 9 (PHQ-9)   This questionnaire is designed to assess for depression in adults.  Based on the score, he is experiencing moderate symptoms of depression. Client identified the following symptoms of depression: lack of interest, difficulty with sleep, poor appetite or overeating, feeling bad about self and poor concentration.         Summary (based on clinical interview, review of records, test results):  Based on the client's Rex Questionnaires, DORY-7, PHQ-9, and interview results, the client has been diagnosed with Major Depressive Disorder, Recurrent, In partial remission (MDD) and Attention-Deficit/Hyperactivity Disorder, Predominantly inattentive presentation (ADHD).    All the client's overall scores within the ADHD Questionnaires scored in the clinically significant range for ADHD.  His BAARS-IV: Self report for Current and Childhood symptoms, as well as, his BFIS-LF overall scores indicated Moderate ADHD.  His overall score for executive functioning (BDEFS-LF) indicated Severe ADHD.  Also clinically significant for ADHD were the subscales for Inattention under the BAARS-IV: Self report for Current and Childhood symptoms and the following subscales under the BDEFS-LF: Self-management to Time, Self-organization/Problem solving, Self-motivation, and Self-regulation of emotions.  In addition, the ADHD-EF Index Score, a strong indicator of ADHD when scored at the 95 percentile rank or higher, were scored at the 97th percentile rank for the Self score and at the 95th and 99th+ percentile rank for his two collateral/Other reporters; thus, indicator a strong likelihood the client does struggle with ADHD.  Results from one of the client's two collateral/Other reporters, his mother, were similar to the client's Self report albeit generally higher.  Similar to the client, all overall scores  from the Mother's responses scored at, or above, the clinically significant 93rd percentile rank (BAARS-IV: Current [97th percentile rank] and Childhood [93rd], BDEFS-LF [99th+], and BFIS-LF [95th]).  Results from the client's second , his sister, were consistently lower then the client's Self report and all but the BDEFS-LF (96th percentile rank) were, overall, subclinical.  The remaining domains, BAARS-IV: Other-Current and Childhood, as well as, the BFIS-LF were all scored in the Borderline range of ADHD.  However, the Subscale for Inattention was scored at the 95th percentile rank within the BAARS-IV: Other-Current and the 92nd percentile rank within the BAARS-IV: Other-Childhood.  The client's PHQ-9 score of 10 indicated the possible presence of Moderate levels of depression, whereas, his DORY-7 score of 6 indicated the possible presence of Mild anxiety.    Diagnosis:   296.35 (F33.41)  Major Depressive Disorder, Recurrent, In partial remission  314.00 (F90.0)    Attention-Deficit/Hyperactivity Disorder, Predominantly inattentive presentation      Recommendations:    Schedule an appointment with your physician to discuss a medication evaluation., Access resources through websites, books, and articles such as those provided  in the handout., Consider working with an ADHD  or individual therapist to learn skills to  assist with symptom management, as well as ways to improve relationships,  etc that may have been impacted by your symptoms.  and Consider attending workshops or support groups through [employer] ([email or web address]).    Farhat Russell

## 2021-07-09 NOTE — PROGRESS NOTES
"    Assessment & Plan     Attention deficit hyperactivity disorder (ADHD), predominantly inattentive type  Reviewed most recent notes and diagnosis of ADHD inattentive from .  Discussed medication options.   Start adderall as below. Discussed possible side effects.  Non-opiod CSA signed.   Recheck in 3 weeks. Sooner if concerns  - amphetamine-dextroamphetamine (ADDERALL XR) 10 MG 24 hr capsule; Take 1 capsule (10 mg) by mouth daily    Mild major depression (H)  Continue on venlafaxine present dose- 150mg daily     Follow Up: The patient was instructed to contact clinic for worsening symptoms, non-improvement in time frame as expected/discussed, and for questions regarding medications or treatment plan. For virtual visits, the patient was advised to be seen for in person evaluation if symptoms or condition are worsening or non-improvement as expected.          Return in about 3 weeks (around 8/2/2021).    Yamini Jernigan PA-C  [facility] GISELLA Vizcaino is a 26 year old who presents for the following health issues   HPI     Patient would like to discuss ADHD diagnosis.    Last visit in primary care Feb 2021.     Has been seeing Psychologist -  at the [facility] - for a few months now.   Psychologist wanted to evaluate for ADHD based on their ongoing interactions. Carrillo reports he has been having improvements in emotional health but were not leading to improvement in behavioral changes.   \"I wouldn't have thought of ADHD on my own\".  Did screening and family reports.   They met virtually and discussed results- diagnostic of ADHD inattentive and he advised coming in to talk about medications.     Difficulty getting tasks done. \"spacy\" not paying attention.   Difficulty accomplishing work at home- \"just wouldn't get done\"  Grades As, Bs, Cs.   Carrillo dropped out of high school his senior year, got GED later. Has been at community college off and on. Had trouble academically when " "he did 1 yr at [employer]  In his last class for is AA degree now. Planning to apply for 4 yr college. Interested in [employer].    Work - fulltime, warehouse- for a few years. He reports issue at work primarily always being late.     Depression and anxiety have improved since working with .     He was on wellbutrin in high school years - age 18 at [facility]. Didn't think it helped much.     Work- 6am-3:30pm .   Online class- flexible start time. Trys to start 7pm after dinner.     Venlafaxine- has been helpful for falling asleep- \"improved quite a bit\".   Mood- slightly positive.   No SI, no cutting   Pt does not smoke  He does not drink alcohol- sober 2 yrs  He does not use substances and has no substance use hx.       Review of Systems   Constitutional, HEENT, cardiovascular, pulmonary, gi and gu systems are negative, except as otherwise noted.      Objective    /72   Pulse 98   Temp 98.7  F (37.1  C) (Temporal)   Resp 16   Ht 1.754 m (5' 9.06\")   Wt 71.3 kg (157 lb 3.2 oz)   SpO2 98%   BMI 23.18 kg/m    Body mass index is 23.18 kg/m .  Physical Exam   GENERAL: healthy, alert and no distress  EYES: Eyes grossly normal to inspection, PERRL and conjunctivae and sclerae normal  NECK: no adenopathy, no asymmetry, masses, or scars and thyroid normal to palpation  RESP: lungs clear to auscultation - no rales, rhonchi or wheezes  CV: regular rate and rhythm, normal S1 S2, no S3 or S4, no murmur, click or rub, no peripheral edema and peripheral pulses strong  ABDOMEN: soft, nontender, no hepatosplenomegaly, no masses and bowel sounds normal  MS: no gross musculoskeletal defects noted, no edema  NEURO: Normal strength and tone, mentation intact and speech normal  PSYCH: mentation appears normal, affect normal, calm. No SI/HI.            "

## 2021-07-12 ENCOUNTER — OFFICE VISIT (OUTPATIENT)
Dept: FAMILY MEDICINE | Facility: CLINIC | Age: 26
End: 2021-07-12
Payer: COMMERCIAL

## 2021-07-12 VITALS
BODY MASS INDEX: 23.28 KG/M2 | TEMPERATURE: 98.7 F | HEIGHT: 69 IN | SYSTOLIC BLOOD PRESSURE: 134 MMHG | OXYGEN SATURATION: 98 % | HEART RATE: 98 BPM | DIASTOLIC BLOOD PRESSURE: 72 MMHG | RESPIRATION RATE: 16 BRPM | WEIGHT: 157.2 LBS

## 2021-07-12 DIAGNOSIS — F32.0 MILD MAJOR DEPRESSION (H): ICD-10-CM

## 2021-07-12 DIAGNOSIS — F90.0 ATTENTION DEFICIT HYPERACTIVITY DISORDER (ADHD), PREDOMINANTLY INATTENTIVE TYPE: Primary | ICD-10-CM

## 2021-07-12 PROCEDURE — 99214 OFFICE O/P EST MOD 30 MIN: CPT | Performed by: PHYSICIAN ASSISTANT

## 2021-07-12 RX ORDER — DEXTROAMPHETAMINE SACCHARATE, AMPHETAMINE ASPARTATE MONOHYDRATE, DEXTROAMPHETAMINE SULFATE AND AMPHETAMINE SULFATE 2.5; 2.5; 2.5; 2.5 MG/1; MG/1; MG/1; MG/1
10 CAPSULE, EXTENDED RELEASE ORAL DAILY
Qty: 30 CAPSULE | Refills: 0 | Status: SHIPPED | OUTPATIENT
Start: 2021-07-12 | End: 2021-08-26 | Stop reason: DRUGHIGH

## 2021-07-12 ASSESSMENT — MIFFLIN-ST. JEOR: SCORE: 1694.3

## 2021-07-12 NOTE — PATIENT INSTRUCTIONS
Continue on the venlafaxine     Start the adderall XR 10mg daily.   Monitor how you feel- what is improving , any side effects

## 2021-07-12 NOTE — LETTER
[facility] GISELLA  07/12/21  Patient: Carrillo Aceves  YOB: 1995  Medical Record Number: [id number]                                                                                  Non-Opioid Controlled Substance Agreement    This is an agreement between you and your provider regarding safe and appropriate use of controlled substances prescribed by your care team. Controlled substances are?medicines that can cause physical and mental dependence (abuse).     There are strict laws about having and using these medicines. We here at [facility] are  committed to working with you in your efforts to get better. To support you in this work, we'll help you schedule regular office appointments for medicine refills. If we must cancel or change your appointment for any reason, we'll make sure you have enough medicine to last until your next appointment.     As a Provider, I will:     Listen carefully to your concerns while treating you with respect.     Recommend a treatment plan that I believe is in your best interest and may involve therapies other than medicine.      Talk with you often about the possible benefits and the risk of harm of any medicine that we prescribe for you.    Assess the safety of this medicine and check how well it works.      Provide a plan on how to taper (discontinue or go off) using this medicine if the decision is made to stop its use.      ::  As a Patient, I understand controlled substances:       Are prescribed by my care provider to help me function or work and manage my condition(s).?    Are strong medicines and can cause serious side effects.       Need to be taken exactly as prescribed.?Combining controlled substances with certain medicines or chemicals (such as illegal drugs, alcohol, sedatives, sleeping pills, and benzodiazepines) can be dangerous or even fatal.? If I stop taking my medicines suddenly, I may have severe withdrawal symptoms.     The risks,  benefits, and side effects of these medicine(s) were explained to me. I agree that:    1. I will take part in other treatments as advised by my care team. This may be psychiatry or counseling, physical therapy, behavioral therapy, group treatment or a referral to specialist.    2. I will keep all my appointments and understand this is part of the monitoring of controlled substances.?My care team may require an office visit for EVERY controlled substance refill. If I miss appointments or don t follow instructions, my care team may stop my medicine    3. I will take my medicines as prescribed. I will not change the dose or schedule unless my care team tells me to. There will be no refills if I run out early.      4. I may be asked to come to the clinic and complete a urine drug test or complete a pill count. If I don t give a urine sample or participate in a pill count, the care team may stop my medicine.    5. I will only receive controlled substance prescriptions from this clinic. If I am treated by another provider, I will tell them that I am taking controlled substances and that I have a treatment agreement with this provider. I will inform my [facility] care team within one business day if I am given a prescription for any controlled substance by another healthcare provider. My [facility] care team can contact other providers and pharmacists about my use of any medicines.    6. It is up to me to make sure that I don't run out of my medicines on weekends or holidays.?If my care team is willing to refill my prescription without a visit, I must request refills only during office hours. Refills may take up to 3 business days to process. I will use one pharmacy to fill all my controlled substance prescriptions. I will notify the clinic about any changes to my insurance or medicine availability.    7. I am responsible for my prescriptions. If the medicine/prescription is lost, stolen or destroyed, it will  not be replaced.?I also agree not to share controlled substance medicines with anyone.     8. I am aware I should not use any illegal or recreational drugs. I agree not to drink alcohol unless my care team says I can.     9. If I enroll in the [place] [facility] Cannabis program, I will tell my care team before my next refill.    10. I will tell my care team right away if I become pregnant, have a new medical problem treated outside of my regular clinic, or have a change in my medicines.     11. I understand that this medicine can affect my thinking, judgment and reaction time.? Alcohol and drugs affect the brain and body, which can affect the safety of my driving. Being under the influence of alcohol or drugs can affect my decision-making, behaviors, personal safety and the safety of others. Driving while impaired (DWI) can occur if a person is driving, operating or in physical control of a car, motorcycle, boat, snowmobile, ATV, motorbike, off-road vehicle or any other motor vehicle ([place] Statute 169A.20). I understand the risk if I choose to drive or operate any vehicle or machinery.    I understand that if I do not follow any of the conditions above, my prescriptions or treatment may be stopped or changed.   I agree that my provider, clinic care team and pharmacy may work with any city, state or federal law enforcement agency that investigates the misuse, sale or other diversion of my controlled medicine. I will allow my provider to discuss my care with, or share a copy of, this agreement with any other treating provider, pharmacy or emergency room where I receive care.     I have read this agreement and have asked questions about anything I did not understand.    ________________________________________________________  Patient Signature - Carrillo Aceves     ___________________                   Date     ________________________________________________________  Provider Signature - Yamini Jernigan PA-C        ___________________                   Date     ________________________________________________________  Witness Signature (required if provider not present while patient signing)          ___________________                   Date

## 2021-07-14 ENCOUNTER — VIRTUAL VISIT (OUTPATIENT)
Dept: PSYCHOLOGY | Facility: CLINIC | Age: 26
End: 2021-07-14
Payer: COMMERCIAL

## 2021-07-14 DIAGNOSIS — F90.0 ATTENTION DEFICIT HYPERACTIVITY DISORDER, INATTENTIVE TYPE: Primary | ICD-10-CM

## 2021-07-14 DIAGNOSIS — F33.41 MAJOR DEPRESSIVE DISORDER, RECURRENT EPISODE, IN PARTIAL REMISSION (H): ICD-10-CM

## 2021-07-14 PROCEDURE — 90834 PSYTX W PT 45 MINUTES: CPT | Mod: GT | Performed by: PSYCHOLOGIST

## 2021-07-14 NOTE — PROGRESS NOTES
"                                           Progress Note    Patient Name: Carrillo Aceves  Date: 7/14/21         Service Type: Individual      Session Start Time: 3:01pm Session End Time: 3:53pm     Session Length: 52 minutes     Session #: 26    Attendees: Client attended alone    Service Modality:  Video Visit:      Provider verified identity through the following two step process.  Patient provided:  Patient photo    Telemedicine Visit: The patient's condition can be safely assessed and treated via synchronous audio and visual telemedicine encounter.      Reason for Telemedicine Visit: Services only offered telehealth    Originating Site (Patient Location): Patient's home    Distant Site (Provider Location): Provider Remote Setting    Consent:  The patient/guardian has verbally consented to: the potential risks and benefits of telemedicine (video visit) versus in person care; bill my insurance or make self-payment for services provided; and responsibility for payment of non-covered services.     Patient would like the video invitation sent by: Send to e-mail at: [email or web address]    Mode of Communication:  Video Conference via [employer]    As the provider I attest to compliance with applicable laws and regulations related to telemedicine.     Treatment Plan Last Reviewed: 12/16/20; Reviewed: 5/12/21  PHQ-9 / DORY-7 : 14 / 10 (on 12/3/20); 12 / 9 (on 1/27/21); 10 / 5 (on 5/12/21)    DATA  Interactive Complexity: No  Crisis: No       Progress Since Last Session (Related to Symptoms / Goals / Homework):   Symptoms: Stable:  \" I would say my mood has improved a bit.\"    Homework: Partially completed      Episode of Care Goals: Satisfactory progress - ACTION (Actively working towards change); Intervened by reinforcing change plan / affirming steps taken     Current / Ongoing Stressors and Concerns:   Work; School     Treatment Objective(s) Addressed in This Session:   Discussed client starting on Adderall since his ADHD " "diagnosis last week  Explore client's experience of \"agoraphobia July 4th weekend,\"  Review and assign homework; discuss topic for next session          Notes from 7/14/21 Session:    Started Adderall two days ago, \"It helps a little bit.  There's a bit of less mental  resistance.  I feel more clear headed. \"   He feels less resistance to doing tasks around the house (I.e. - taking garbage  Out).   He stated his mood has been elevated since the ADHD Diagnosis.   Update on school and homework, \"I think I'll have more to talk about next week.  Something I did want to talk about and forgot to mention.  It was a three day weekend and I was alone in the house.  What kind of happened is like, I don't know if I started to get depressed, but I was noticing some pretty strong agoraphobia.  For example, I didn't want to go out and  food or go out in the yard.  Like I was afraid to step outside of the house.  It kind of slowly increased to a certain level until I became aware of it.  It reminded me of periods of time I've been on break from school or work and I spent time in my room or house. \"  \"As soon as I went to work that feeling just disappeared.  Maybe on Saturday I was vaguely aware of it but Sunday is when I really noticed it.  Monday (day off) I had apprehension about going out and getting food (dinner) but I forced myself out. A lot of the time, the internal narrator, seems to be detached from emotions.  The narrator voice talks about how I'm feeling as if it's happening to someone else. \"  Protective force to distance himself from emotions?  Client thinks it could be \"not being able to be with my emotions.\"  \"Being with people can shakes me out of just being in my head.\"  \"I don't feel lonely when I'm by myself.  I don't dislike people but I tend to just watch people when I'm out.  It helps me to have some external structure.  I never like being the center of attention but I liked my parents paying " "attention.  I have this anxiety, because I tend to be friendly and approachable. I tend to smile pretty easily.  I come off as pretty easy going in situations in which it's expected of me.  But I have this anxiety if somebody is going to want to be friends with me.  I don't want them to want to get close to me. I don't want to just reject them if they think we are friends but I don't also want to be that close to them.  I'll accept invitations to parties but as soon as we don't see each other I tend to slowly fade out.\"      HW: What does it mean to let someone (non-family) into his personal 'bubble.'   Client would like to talk about ADHD and school next week.           Intervention:   CBT: Explored triggers to \"agoraphobia\" or depression; Reviewed homework from last week, assigned new homework; Psychoeducation; Problem solving; Pattern recognition  Asked insight oriented questions; Active listening and other rapport building skills        ASSESSMENT: Current Emotional / Mental Status (status of significant symptoms):   Risk status (Self / Other harm or suicidal ideation)   Patient denies current fears or concerns for personal safety.   Patient denies current or recent suicidal ideation or behaviors.   Patient denies current or recent homicidal ideation or behaviors.   Patient denies current or recent self injurious behavior or ideation.   Patient denies other safety concerns.   Patient reports there has been no change in risk factors since their last session.     Patient reports there has been no change in protective factors since their last session.     Recommended that patient call 911 or go to the local ED should there be a change in any of these risk factors.     Appearance:   Appropriate    Eye Contact:   Good  (online.  He notes his eye contact is poor in person).   Psychomotor Behavior: Normal    Attitude:   Cooperative    Orientation:   All   Speech    Rate / Production: Normal/ Responsive Normal " "    Volume:  Normal    Mood:    Normal   Affect:    Appropriate    Thought Content:  Clear    Thought Form:  Coherent  Logical    Insight:    Fair      Medication Review:   Changes to psychiatric medications, see updated Medication List in EPIC.      Medication Compliance:   Yes     Changes in Health Issues:   None reported     Chemical Use Review:   Substance Use: Chemical use reviewed, no active concerns identified      Tobacco Use: No current tobacco use.      Diagnosis:  1. Attention-Deficit/Hyperactivity Disorder, Predominantly inattentive presentation    2. Major depressive disorder, recurrent episode, in partial remission (H)        Collateral Reports Completed:   Not Applicable    PLAN: (Patient Tasks / Therapist Tasks / Other)  Client will explore what it would mean to him to invite someone into his 'bubble/personal space'.  His next scheduled appointment is July 22, 2021.        Farhat Lopez Drew                                                         ______________________________________________________________________    Treatment Plan    Patient's Name: Carrillo Aceves  YOB: 1995    Date: 12/16/20; Reviewed and Revised: 5/12/21    DSM5 Diagnoses: 296.35 (F33.41)  Major Depressive Disorder, Recurrent Episode, In partial remission _ and With anxious distress  Psychosocial / Contextual Factors: School  WHODAS: 32 (on 12/3/20)    Referral / Collaboration:  Referral to another professional/service is not indicated at this time.    Anticipated number of session or this episode of care: 30      MeasurableTreatment Goal(s) related to diagnosis / functional impairment(s)  Goal 1: Patient will \"make a schedule for myself and whether or not I'm following that schedule.  I can identify areas I am completing and ones that I'm not.\"      I will know I've met my goal when I am completing scheduled activities on time and I'm scheduling as many activities as I can so I'm more aware of what I am doing every " "day.  A successful week is completing 50% of his scheduled activities in a week for two weeks.    Objective #A Client will create his weekly schedule on Sundays.   Patient will create a weekly To Do schedule.  Status: New - Date: 12/23/20     Intervention(s)  Therapist will assign homework as appropriate  Check on progress each session and help problem solve barriers to successfully completing this goal      Goal 2: Patient will develop a healthy sleep routine.    I will know I've met my goal when I can fall asleep consistently and I don't have a good reason to have disruptive sleep.  I would like to be able to fall asleep within ten minutes and be able to fall back asleep within ten minutes if I wake up in the middle of the night.      Objective #A Start to develop a sleep schedule    Status: New - Date: 12/23/20     Patient will identify healthy sleep hygiene practices.    Intervention(s)  Therapist will assign homework as appropriate  teach about sleep hygiene/routine.    Objective #B  Patient will TBD.    Status: TBD     Intervention(s)  Therapist will TBD.    Goal 3: Patient will \"learn to identify his emotional states.\"   Being able to identify my own emotional states.  At the end of each day record emotions throughout the day that I could identify.  To increase the range of emotions that I can identify.    Objective #A Client will list daily the emotions he was able to identify he felt that day.    Status: New - Date: 5/12/21     Patient will list emotions he felt daily.    Intervention(s)  Therapist will assign homework as appropriate  teach about emotions and how to differentiate  Identify and problem solve barriers to reaching goal.        Patient has not reviewed nor agreed to the above plan.      Farhat Russell  July 14, 2021  "

## 2021-07-22 ENCOUNTER — VIRTUAL VISIT (OUTPATIENT)
Dept: PSYCHOLOGY | Facility: CLINIC | Age: 26
End: 2021-07-22
Payer: COMMERCIAL

## 2021-07-22 DIAGNOSIS — F90.0 ATTENTION DEFICIT HYPERACTIVITY DISORDER, INATTENTIVE TYPE: Primary | ICD-10-CM

## 2021-07-22 DIAGNOSIS — F33.41 MAJOR DEPRESSIVE DISORDER, RECURRENT EPISODE, IN PARTIAL REMISSION (H): ICD-10-CM

## 2021-07-22 PROCEDURE — 90834 PSYTX W PT 45 MINUTES: CPT | Mod: GT | Performed by: PSYCHOLOGIST

## 2021-07-22 NOTE — PROGRESS NOTES
"                                           Progress Note    Patient Name: Carrillo Aceves  Date: 7/22/21         Service Type: Individual      Session Start Time: 4:05pm Session End Time: 4:57pm     Session Length: 52 minutes     Session #: 27    Attendees: Client attended alone    Service Modality:  Video Visit:      Provider verified identity through the following two step process.  Patient provided:  Patient photo    Telemedicine Visit: The patient's condition can be safely assessed and treated via synchronous audio and visual telemedicine encounter.      Reason for Telemedicine Visit: Services only offered telehealth    Originating Site (Patient Location): Patient's home    Distant Site (Provider Location): Provider Remote Setting    Consent:  The patient/guardian has verbally consented to: the potential risks and benefits of telemedicine (video visit) versus in person care; bill my insurance or make self-payment for services provided; and responsibility for payment of non-covered services.     Patient would like the video invitation sent by: Send to e-mail at: [email or web address]    Mode of Communication:  Video Conference via Amwell    As the provider I attest to compliance with applicable laws and regulations related to telemedicine.     Treatment Plan Last Reviewed: 12/16/20; Reviewed: 5/12/21  PHQ-9 / DORY-7 : 14 / 10 (on 12/3/20); 12 / 9 (on 1/27/21); 10 / 5 (on 5/12/21)    DATA  Interactive Complexity: No  Crisis: No       Progress Since Last Session (Related to Symptoms / Goals / Homework):   Symptoms: Worsening:  \" It's been rough week.\"    Homework: Partially completed      Episode of Care Goals: Satisfactory progress - ACTION (Actively working towards change); Intervened by reinforcing change plan / affirming steps taken     Current / Ongoing Stressors and Concerns:   Work; School (dropped)     Treatment Objective(s) Addressed in This Session:   Discussed his identify now that he has been diagnosed with " "ADHD; explored how ADHD may have contributed to difficulties with his class (he dropped his class on Monday)  Decrease frequency and intensity of feeling down, depressed, hopeless  Explored client's feelings regarding dropping class and automatic thoughts that contribute to depression  Review and assign homework; helped reframe negative self-talk          Notes from 7/14/21 Session:   Client dropped his class, \"There was more work than I thought due and I didn't think I could get it done.  I've been feeling a little depressed lately but better today.  Difficulty sleeping, a bit worthless, a bit incompetent.\"    \"I hadn't checked the class for the last two weeks.  As the school year progress, as the semester progresses, it gets harder and harder to keep up.\"  His first semester this year he received an \"A,\" a \"B or C the second semester and I dropped my class this semester.\"    \"I have less energy to spend on it or I lose interest in it or something.\"    Possible correlation between interest in a class and the grade.  He feels \"a little bit better today.  I did a little cleaning around the house yesterday.\"  'Thinking back, can you think of a path that would have allowed you to complete the class?'  \"I think doing homework a couple hours a day for a week.\"  Clarified question: \"Client feels he can map out a successful path to completing this last class.  He lacks confidence in carrying the plan out.  Maybe if I had Adderall before class started it would have helped.\"    Is there a way to be disappointed in not finishing class and not personalizing it (beating himself up)?  \"I'm not sure.\"    What's the tape (negative self-talk) you use to beat yourself up?  \"I have a feeling of failure or not being good enough.\"    \"I tend not to use 'I' statements in my self-talk.\"  Helped client identify feelings of frustration and disappointment of not completing his class.   What thoughts lead to feeling more depressed/down?  \" " "I'm not happy where I am right now.  What makes it harder for me is making plans to meet a goal and not following it.  And then looking back and figuring out how I could have accomplished my goal and not understanding why I couldn't carry it out.\"    Reframed how he sees his identify now w/an ADHD dx.  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    Reframe failure: success  HW: Think how things could be different          Intervention:   CBT: Reframing; Problem solving; Socratic Questioning; Pattern recognition; Psychoeducation; Reviewed and assigned homework  Asked insight oriented questions; Active listening and other rapport building skills        ASSESSMENT: Current Emotional / Mental Status (status of significant symptoms):   Risk status (Self / Other harm or suicidal ideation)   Patient denies current fears or concerns for personal safety.   Patient denies current or recent suicidal ideation or behaviors.   Patient denies current or recent homicidal ideation or behaviors.   Patient denies current or recent self injurious behavior or ideation.   Patient denies other safety concerns.   Patient reports there has been no change in risk factors since their last session.     Patient reports there has been no change in protective factors since their last session.     Recommended that patient call 911 or go to the local ED should there be a change in any of these risk factors.     Appearance:   Appropriate    Eye Contact:   Good  (online.  He notes his eye contact is poor in person).   Psychomotor Behavior: Normal    Attitude:   Cooperative    Orientation:   All   Speech    Rate / Production: Normal/ Responsive Normal     Volume:  Normal    Mood:    Normal   Affect:    Appropriate    Thought Content:  Clear    Thought Form:  Coherent  Logical    Insight:    Fair      Medication Review:   Changes to psychiatric " "medications, see updated Medication List in EPIC.      Medication Compliance:   Yes     Changes in Health Issues:   None reported     Chemical Use Review:   Substance Use: Chemical use reviewed, no active concerns identified      Tobacco Use: No current tobacco use.      Diagnosis:  1. Attention-Deficit/Hyperactivity Disorder, Predominantly inattentive presentation    2. Major depressive disorder, recurrent episode, in partial remission (H)        Collateral Reports Completed:   Not Applicable    PLAN: (Patient Tasks / Therapist Tasks / Other)  Client will explore what he can learn from taking his class this past semester that could help him pass it next semester.  He will also explore his self-identity now that he has been diagnosed with ADHD.  His next scheduled appointment is July 28, 2021.        Farhat Marroquin Drew                                                         ______________________________________________________________________    Treatment Plan    Patient's Name: Crarillo Aceves  YOB: 1995    Date: 12/16/20; Reviewed and Revised: 5/12/21    DSM5 Diagnoses: 296.35 (F33.41)  Major Depressive Disorder, Recurrent Episode, In partial remission _ and With anxious distress  Psychosocial / Contextual Factors: School  WHODAS: 32 (on 12/3/20)    Referral / Collaboration:  Referral to another professional/service is not indicated at this time.    Anticipated number of session or this episode of care: 30      MeasurableTreatment Goal(s) related to diagnosis / functional impairment(s)  Goal 1: Patient will \"make a schedule for myself and whether or not I'm following that schedule.  I can identify areas I am completing and ones that I'm not.\"      I will know I've met my goal when I am completing scheduled activities on time and I'm scheduling as many activities as I can so I'm more aware of what I am doing every day.  A successful week is completing 50% of his scheduled activities in a week for two " "weeks.    Objective #A Client will create his weekly schedule on Sundays.   Patient will create a weekly To Do schedule.  Status: New - Date: 12/23/20     Intervention(s)  Therapist will assign homework as appropriate  Check on progress each session and help problem solve barriers to successfully completing this goal      Goal 2: Patient will develop a healthy sleep routine.    I will know I've met my goal when I can fall asleep consistently and I don't have a good reason to have disruptive sleep.  I would like to be able to fall asleep within ten minutes and be able to fall back asleep within ten minutes if I wake up in the middle of the night.      Objective #A Start to develop a sleep schedule    Status: New - Date: 12/23/20     Patient will identify healthy sleep hygiene practices.    Intervention(s)  Therapist will assign homework as appropriate  teach about sleep hygiene/routine.    Objective #B  Patient will TBD.    Status: TBD     Intervention(s)  Therapist will TBD.    Goal 3: Patient will \"learn to identify his emotional states.\"   Being able to identify my own emotional states.  At the end of each day record emotions throughout the day that I could identify.  To increase the range of emotions that I can identify.    Objective #A Client will list daily the emotions he was able to identify he felt that day.    Status: New - Date: 5/12/21     Patient will list emotions he felt daily.    Intervention(s)  Therapist will assign homework as appropriate  teach about emotions and how to differentiate  Identify and problem solve barriers to reaching goal.        Patient has not reviewed nor agreed to the above plan.      Farhat Russell  July 22, 2021  "

## 2021-07-28 ENCOUNTER — VIRTUAL VISIT (OUTPATIENT)
Dept: PSYCHOLOGY | Facility: CLINIC | Age: 26
End: 2021-07-28
Payer: COMMERCIAL

## 2021-07-28 DIAGNOSIS — F90.0 ATTENTION DEFICIT HYPERACTIVITY DISORDER, INATTENTIVE TYPE: Primary | ICD-10-CM

## 2021-07-28 DIAGNOSIS — F33.41 MAJOR DEPRESSIVE DISORDER, RECURRENT EPISODE, IN PARTIAL REMISSION (H): ICD-10-CM

## 2021-07-28 PROCEDURE — 90834 PSYTX W PT 45 MINUTES: CPT | Mod: GT | Performed by: PSYCHOLOGIST

## 2021-07-28 NOTE — PROGRESS NOTES
"                                           Progress Note    Patient Name: Carrillo Aceves  Date: 7/28/21         Service Type: Individual      Session Start Time: 4:00pm Session End Time: 4:52pm     Session Length: 52 minutes     Session #: 28    Attendees: Client attended alone    Service Modality:  Video Visit:      Provider verified identity through the following two step process.  Patient provided:  Patient photo    Telemedicine Visit: The patient's condition can be safely assessed and treated via synchronous audio and visual telemedicine encounter.      Reason for Telemedicine Visit: Services only offered telehealth    Originating Site (Patient Location): Patient's home    Distant Site (Provider Location): Provider Remote Setting    Consent:  The patient/guardian has verbally consented to: the potential risks and benefits of telemedicine (video visit) versus in person care; bill my insurance or make self-payment for services provided; and responsibility for payment of non-covered services.     Patient would like the video invitation sent by: Send to e-mail at: [email or web address]    Mode of Communication:  Video Conference via Amwell    As the provider I attest to compliance with applicable laws and regulations related to telemedicine.     Treatment Plan Last Reviewed: 12/16/20; Reviewed: 5/12/21  PHQ-9 / DORY-7 : 14 / 10 (on 12/3/20); 12 / 9 (on 1/27/21); 10 / 5 (on 5/12/21)    DATA  Interactive Complexity: No  Crisis: No       Progress Since Last Session (Related to Symptoms / Goals / Homework):   Symptoms: Worsening:  \" I'm good.\"    Homework: Partially completed      Episode of Care Goals: Satisfactory progress - ACTION (Actively working towards change); Intervened by reinforcing change plan / affirming steps taken     Current / Ongoing Stressors and Concerns:   Work; School (dropped)     Treatment Objective(s) Addressed in This Session:   Discussed how effective his new medication, Adderall, has been.  " "Discussed how his symptoms of ADHD may make it more difficult to sustain an exercise routine.  Assigned homework; Educated and demonstrated a [employer] [place] Pros & Cons list to help with deciding on Working Out or Not; Helped client develop plans to create a routine; Updated Treatment Plan       Notes from 7/28/21 Session:   \"I'm feeling kind of relieved that I don't come home and feel guilty about not doing homework.\"   \"I'm thinking I want to get back to some goal-setting.  Like, exercising so much a week.\"  His Adderall is helping \"stay awake more easily.  I don't feel as tired after work and I don't feel like taking a nap right away once I get home. \"  He stated he \"hasn't noticed anything big\" regarding focus or concentration.  \"I might be a little less bored or less forgetful. \"  Client will schedule \"a specific time, duration (probably 20 minutes), and what I'm going to do.\"  Exercise(s) that he enjoys: \"Probably lifting weights is the one I've been aiming for for awhile.  I think I'm going to start with cardio.\"  Client believes he \"has a really strong knowledge to start out a routine.  I know what exercises to select.  What's held me back a lot is feeling intimidated by the amount of effort that might be involved.\"  Problem solved how to develop a routine.  What do you want to get out of the weight lifting?  \"I think it would be nice to get more muscle mass.\"  Helped develop specific plans for his exercising.  Client will do cardio for 20 minutes three times a week.  A barrier to being successful is \"probably boredom.\"  \"I want to be in kind of a meditative state when I'm exercise.  It's important to me to be focused on what I'm doing. \"  While working out he can \"get bored\" and \"having a rising feeling to get out and do something else.\"  Talked about positive reinforcement regarding working out, finding a reinforcement to working out. - \"Something I've used before when meditating is to come up with reasons to " "why I'm meditating.  And when it comes to meditating nothing I thought of before seems to be important anymore.\"    HW: [employer] [place] Pros & Cons on \"Working Out\" and \"Not Working Out.\"        Intervention:   CBT: Problem solving; Socratic Questioning; Pattern recognition; Psychoeducation; Assigned homework  DBT: Educated and provided an example of a [employer] [place] Pros & Cons list  Active listening and other rapport building skills; Collaborated care with his PCP        ASSESSMENT: Current Emotional / Mental Status (status of significant symptoms):   Risk status (Self / Other harm or suicidal ideation)   Patient denies current fears or concerns for personal safety.   Patient denies current or recent suicidal ideation or behaviors.   Patient denies current or recent homicidal ideation or behaviors.   Patient denies current or recent self injurious behavior or ideation.   Patient denies other safety concerns.   Patient reports there has been no change in risk factors since their last session.     Patient reports there has been no change in protective factors since their last session.     Recommended that patient call 911 or go to the local ED should there be a change in any of these risk factors.     Appearance:   Appropriate    Eye Contact:   Good  (online.  He notes his eye contact is poor in person).   Psychomotor Behavior: Normal    Attitude:   Cooperative    Orientation:   All   Speech    Rate / Production: Normal/ Responsive Normal     Volume:  Normal    Mood:    Normal   Affect:    Appropriate    Thought Content:  Clear    Thought Form:  Coherent  Logical    Insight:    Fair      Medication Review:   Changes to psychiatric medications, see updated Medication List in EPIC.      Medication Compliance:   Yes     Changes in Health Issues:   None reported     Chemical Use Review:   Substance Use: Chemical use reviewed, no active concerns identified      Tobacco Use: No current tobacco use.      Diagnosis:  1. " "Attention-Deficit/Hyperactivity Disorder, Predominantly inattentive presentation    2. Major depressive disorder, recurrent episode, in partial remission (H)        Collateral Reports Completed:   Not Applicable    PLAN: (Patient Tasks / Therapist Tasks / Other)  Client will complete a [employer] [place] Pros & Cons list on \"Working Out vs Not Working Out.\"  He will also continue to explore what he can learn from taking his class this past semester that could help him pass it next semester and his self-identity now that he has been diagnosed with ADHD.  His next scheduled appointment is August 4, 2021.        Farhat Marroquin Karmenwa                                                         ______________________________________________________________________    Treatment Plan    Patient's Name: Carrillo Aceves  YOB: 1995    Date: 12/16/20; Reviewed and Revised: 5/12/21    DSM5 Diagnoses: 296.35 (F33.41)  Major Depressive Disorder, Recurrent Episode, In partial remission _ and With anxious distress  Psychosocial / Contextual Factors: School  WHODAS: 32 (on 12/3/20)    Referral / Collaboration:  Referral to another professional/service is not indicated at this time.    Anticipated number of session or this episode of care: 30      MeasurableTreatment Goal(s) related to diagnosis / functional impairment(s)  Goal 1: Patient will \"make a schedule for myself and whether or not I'm following that schedule.  I can identify areas I am completing and ones that I'm not.\"      I will know I've met my goal when I am completing scheduled activities on time and I'm scheduling as many activities as I can so I'm more aware of what I am doing every day.  A successful week is completing 50% of his scheduled activities in a week for two weeks.    Objective #A Client will create his weekly schedule on Sundays.   Patient will create a weekly To Do schedule.  Status: New - Date: 12/23/20     Intervention(s)  Therapist will assign homework " "as appropriate  Check on progress each session and help problem solve barriers to successfully completing this goal      Goal 2: Patient will develop a healthy sleep routine.    I will know I've met my goal when I can fall asleep consistently and I don't have a good reason to have disruptive sleep.  I would like to be able to fall asleep within ten minutes and be able to fall back asleep within ten minutes if I wake up in the middle of the night.      Objective #A Start to develop a sleep schedule (on hold for Objective #B)   Status: New - Date: 12/23/20     Patient will identify healthy sleep hygiene practices.    Intervention(s)  Therapist will assign homework as appropriate  teach about sleep hygiene/routine.    Objective #B  Patient will schedule three time per week to exercise.    Status: New - Date: 7/28/21     Intervention(s)  Therapist will assign homework as appropriate  teach skills on reaching goals.    Goal 3: Patient will \"learn to identify his emotional states.\"   Being able to identify my own emotional states.  At the end of each day record emotions throughout the day that I could identify.  To increase the range of emotions that I can identify.    Objective #A Client will list daily the emotions he was able to identify he felt that day.    Status: New - Date: 5/12/21     Patient will list emotions he felt daily.    Intervention(s)  Therapist will assign homework as appropriate  teach about emotions and how to differentiate  Identify and problem solve barriers to reaching goal.        Patient has reviewed and agreed to the above plan.      Farhat Russell  July 28, 2021  "

## 2021-07-28 NOTE — PROGRESS NOTES
"    Assessment & Plan     Attention deficit hyperactivity disorder (ADHD), predominantly inattentive type  Tolerated adderall XR 10mg but not seeing much benefit or improvement in sx  Increase dose from XR 10mg to XR 20mg daily  Recheck in 3 weeks. Sooner if concerns   - amphetamine-dextroamphetamine (ADDERALL XR) 20 MG 24 hr capsule; Take 1 capsule (20 mg) by mouth daily    Need for vaccination  Given   - C HUMAN PAPILLOMA VIRUS VACCINE (GARDASIL 9) 3 DOSE IM       Follow Up: The patient was instructed to contact clinic for worsening symptoms, non-improvement in time frame as expected/discussed, and for questions regarding medications or treatment plan. For virtual visits, the patient was advised to be seen for in person evaluation if symptoms or condition are worsening or non-improvement as expected.       Return in about 3 weeks (around 8/23/2021).    NICK W[facility] GISELLA Vizcaino is a 26 year old who presents for the following health issues     History of Present Illness       He eats 0-1 servings of fruits and vegetables daily.He consumes 0 sweetened beverage(s) daily.He exercises with enough effort to increase his heart rate 20 to 29 minutes per day.  He exercises with enough effort to increase his heart rate 5 days per week.   He is taking medications regularly.             Medication Followup of  ADDERALL 10 MG     Taking Medication as prescribed: yes    Side Effects:  None    Medication Helping Symptoms:  \" It has been a little helpful.\"      He thinks since starting adderall has had less anxiety. He has had less daytime tiredness.   Thinks a little bit easier to get started on tasks, but that overall adderall has had a \"minor effect\" (minor benefit) since starting on adderall.   Doesn't dread starting tasks now.  No side effects.     Takes between 6am-9am.   Cannot tell when it wears off.     Sleep- was traveling to see family members and stayed at cabin in " [facility] grandparents owned. There were young kids who were up early, so recent sleep due to that.   Otherwise sleep ok.  Mood- pretty good.     No appetite suppression.  No palpitations    Anxiety Follow-Up    How are you doing with your anxiety since your last visit?     Improved.     Are you having other symptoms that might be associated with anxiety? No    Have you had a significant life event? No     Are you feeling depressed? No    Do you have any concerns with your use of alcohol or other drugs? No    Social History     Tobacco Use     Smoking status: Never Smoker     Smokeless tobacco: Never Used   Vaping Use     Vaping Use: Never used   Substance Use Topics     Alcohol use: No     Comment: none for past 2 years (as of 01/2019)     Drug use: No     DORY-7 SCORE 2/22/2021 5/12/2021 8/2/2021   Total Score - - -   Total Score 8 (mild anxiety) - 3 (minimal anxiety)   Total Score 8 5 3     PHQ 2/22/2021 5/12/2021 8/2/2021   PHQ-9 Total Score 7 10 6   Q9: Thoughts of better off dead/self-harm past 2 weeks Not at all Not at all Not at all     Last PHQ-9 8/2/2021   1.  Little interest or pleasure in doing things 0   2.  Feeling down, depressed, or hopeless 0   3.  Trouble falling or staying asleep, or sleeping too much 1   4.  Feeling tired or having little energy 1   5.  Poor appetite or overeating 1   6.  Feeling bad about yourself 1   7.  Trouble concentrating 2   8.  Moving slowly or restless 0   Q9: Thoughts of better off dead/self-harm past 2 weeks 0   PHQ-9 Total Score 6   Difficulty at work, home, or with people -     DORY-7  8/2/2021   1. Feeling nervous, anxious, or on edge 1   2. Not being able to stop or control worrying 0   3. Worrying too much about different things 0   4. Trouble relaxing 1   5. Being so restless that it is hard to sit still 1   6. Becoming easily annoyed or irritable 0   7. Feeling afraid, as if something awful might happen 0   DORY-7 Total Score 3   If you checked any problems, how  "difficult have they made it for you to do your work, take care of things at home, or get along with other people? -       Review of Systems   Constitutional, HEENT, cardiovascular, pulmonary, gi and gu systems are negative, except as otherwise noted.      Objective    /72   Pulse 106   Temp 98.2  F (36.8  C) (Temporal)   Resp 18   Ht 1.754 m (5' 9.06\")   Wt 70.3 kg (154 lb 14.4 oz)   SpO2 96%   BMI 22.84 kg/m    Body mass index is 22.84 kg/m .  Physical Exam   GENERAL: healthy, alert and no distress  EYES: Eyes grossly normal to inspection, PERRL and conjunctivae and sclerae normal  NECK: no adenopathy, no asymmetry, masses, or scars and thyroid normal to palpation  RESP: lungs clear to auscultation - no rales, rhonchi or wheezes  CV: regular rate and rhythm, normal S1 S2, no S3 or S4, no murmur, click or rub, no peripheral edema and peripheral pulses strong  ABDOMEN: soft, nontender, no hepatosplenomegaly, no masses and bowel sounds normal  MS: no gross musculoskeletal defects noted, no edema  NEURO: Normal strength and tone, mentation intact and speech normal  PSYCH: mentation appears normal, affect normal/bright                Answers for HPI/ROS submitted by the patient on 8/2/2021  If you checked off any problems, how difficult have these problems made it for you to do your work, take care of things at home, or get along with other people?: Somewhat difficult  PHQ9 TOTAL SCORE: 6  DORY 7 TOTAL SCORE: 3      "

## 2021-08-02 ENCOUNTER — OFFICE VISIT (OUTPATIENT)
Dept: FAMILY MEDICINE | Facility: CLINIC | Age: 26
End: 2021-08-02
Payer: COMMERCIAL

## 2021-08-02 VITALS
BODY MASS INDEX: 22.94 KG/M2 | RESPIRATION RATE: 18 BRPM | DIASTOLIC BLOOD PRESSURE: 72 MMHG | HEART RATE: 106 BPM | SYSTOLIC BLOOD PRESSURE: 122 MMHG | TEMPERATURE: 98.2 F | HEIGHT: 69 IN | OXYGEN SATURATION: 96 % | WEIGHT: 154.9 LBS

## 2021-08-02 DIAGNOSIS — F90.0 ATTENTION DEFICIT HYPERACTIVITY DISORDER (ADHD), PREDOMINANTLY INATTENTIVE TYPE: Primary | ICD-10-CM

## 2021-08-02 DIAGNOSIS — Z23 NEED FOR VACCINATION: ICD-10-CM

## 2021-08-02 PROCEDURE — 90651 9VHPV VACCINE 2/3 DOSE IM: CPT | Performed by: PHYSICIAN ASSISTANT

## 2021-08-02 PROCEDURE — 90471 IMMUNIZATION ADMIN: CPT | Performed by: PHYSICIAN ASSISTANT

## 2021-08-02 PROCEDURE — 99213 OFFICE O/P EST LOW 20 MIN: CPT | Performed by: PHYSICIAN ASSISTANT

## 2021-08-02 RX ORDER — DEXTROAMPHETAMINE SACCHARATE, AMPHETAMINE ASPARTATE MONOHYDRATE, DEXTROAMPHETAMINE SULFATE AND AMPHETAMINE SULFATE 5; 5; 5; 5 MG/1; MG/1; MG/1; MG/1
20 CAPSULE, EXTENDED RELEASE ORAL DAILY
Qty: 30 CAPSULE | Refills: 0 | Status: SHIPPED | OUTPATIENT
Start: 2021-08-02 | End: 2021-09-16

## 2021-08-02 ASSESSMENT — ANXIETY QUESTIONNAIRES
4. TROUBLE RELAXING: SEVERAL DAYS
GAD7 TOTAL SCORE: 3
8. IF YOU CHECKED OFF ANY PROBLEMS, HOW DIFFICULT HAVE THESE MADE IT FOR YOU TO DO YOUR WORK, TAKE CARE OF THINGS AT HOME, OR GET ALONG WITH OTHER PEOPLE?: SOMEWHAT DIFFICULT
7. FEELING AFRAID AS IF SOMETHING AWFUL MIGHT HAPPEN: NOT AT ALL
GAD7 TOTAL SCORE: 3
7. FEELING AFRAID AS IF SOMETHING AWFUL MIGHT HAPPEN: NOT AT ALL
1. FEELING NERVOUS, ANXIOUS, OR ON EDGE: SEVERAL DAYS
6. BECOMING EASILY ANNOYED OR IRRITABLE: NOT AT ALL
2. NOT BEING ABLE TO STOP OR CONTROL WORRYING: NOT AT ALL
5. BEING SO RESTLESS THAT IT IS HARD TO SIT STILL: SEVERAL DAYS
GAD7 TOTAL SCORE: 3
3. WORRYING TOO MUCH ABOUT DIFFERENT THINGS: NOT AT ALL

## 2021-08-02 ASSESSMENT — PATIENT HEALTH QUESTIONNAIRE - PHQ9
10. IF YOU CHECKED OFF ANY PROBLEMS, HOW DIFFICULT HAVE THESE PROBLEMS MADE IT FOR YOU TO DO YOUR WORK, TAKE CARE OF THINGS AT HOME, OR GET ALONG WITH OTHER PEOPLE: SOMEWHAT DIFFICULT
SUM OF ALL RESPONSES TO PHQ QUESTIONS 1-9: 6
SUM OF ALL RESPONSES TO PHQ QUESTIONS 1-9: 6

## 2021-08-02 ASSESSMENT — MIFFLIN-ST. JEOR: SCORE: 1683.95

## 2021-08-02 NOTE — NURSING NOTE
Prior to immunization administration, verified patients identity using patient s name and date of birth. Please see Immunization Activity for additional information.     Screening Questionnaire for Adult Immunization    Are you sick today?   No   Do you have allergies to medications, food, a vaccine component or latex?   No   Have you ever had a serious reaction after receiving a vaccination?   No   Do you have a long-term health problem with heart, lung, kidney, or metabolic disease (e.g., diabetes), asthma, a blood disorder, no spleen, complement component deficiency, a cochlear implant, or a spinal fluid leak?  Are you on long-term aspirin therapy?   No   Do you have cancer, leukemia, HIV/AIDS, or any other immune system problem?   No   Do you have a parent, brother, or sister with an immune system problem?   No   In the past 3 months, have you taken medications that affect  your immune system, such as prednisone, other steroids, or anticancer drugs; drugs for the treatment of rheumatoid arthritis, Crohn s disease, or psoriasis; or have you had radiation treatments?   No   Have you had a seizure, or a brain or other nervous system problem?   No   During the past year, have you received a transfusion of blood or blood    products, or been given immune (gamma) globulin or antiviral drug?   No   For women: Are you pregnant or is there a chance you could become       pregnant during the next month?   No   Have you received any vaccinations in the past 4 weeks?   No     Immunization questionnaire answers were all negative.        Per orders of , injection of HPV given by Laurie Bradley. Patient instructed to remain in clinic for 15 minutes afterwards, and to report any adverse reaction to me immediately.

## 2021-08-03 ASSESSMENT — ANXIETY QUESTIONNAIRES: GAD7 TOTAL SCORE: 3

## 2021-08-03 ASSESSMENT — PATIENT HEALTH QUESTIONNAIRE - PHQ9: SUM OF ALL RESPONSES TO PHQ QUESTIONS 1-9: 6

## 2021-08-04 ENCOUNTER — VIRTUAL VISIT (OUTPATIENT)
Dept: PSYCHOLOGY | Facility: CLINIC | Age: 26
End: 2021-08-04
Payer: COMMERCIAL

## 2021-08-04 DIAGNOSIS — F33.41 MAJOR DEPRESSIVE DISORDER, RECURRENT EPISODE, IN PARTIAL REMISSION (H): ICD-10-CM

## 2021-08-04 DIAGNOSIS — F90.0 ATTENTION DEFICIT HYPERACTIVITY DISORDER, INATTENTIVE TYPE: Primary | ICD-10-CM

## 2021-08-04 PROCEDURE — 90834 PSYTX W PT 45 MINUTES: CPT | Mod: GT | Performed by: PSYCHOLOGIST

## 2021-08-04 NOTE — PROGRESS NOTES
"                                           Progress Note    Patient Name: Carrillo Aceves  Date: 8/4/21         Service Type: Individual      Session Start Time: 4:01pm Session End Time: 4:53pm     Session Length: 52 minutes     Session #: 29    Attendees: Client attended alone    Service Modality:  Video Visit:      Provider verified identity through the following two step process.  Patient provided:  Patient photo    Telemedicine Visit: The patient's condition can be safely assessed and treated via synchronous audio and visual telemedicine encounter.      Reason for Telemedicine Visit: Services only offered telehealth    Originating Site (Patient Location): Patient's home    Distant Site (Provider Location): Provider Remote Setting    Consent:  The patient/guardian has verbally consented to: the potential risks and benefits of telemedicine (video visit) versus in person care; bill my insurance or make self-payment for services provided; and responsibility for payment of non-covered services.     Patient would like the video invitation sent by: Send to e-mail at: [email or web address]    Mode of Communication:  Video Conference via [employer]    As the provider I attest to compliance with applicable laws and regulations related to telemedicine.     Treatment Plan Last Reviewed: 12/16/20; Reviewed: 5/12/21  PHQ-9 / DORY-7 : 14 / 10 (on 12/3/20); 12 / 9 (on 1/27/21); 10 / 5 (on 5/12/21)    DATA  Interactive Complexity: No  Crisis: No       Progress Since Last Session (Related to Symptoms / Goals / Homework):   Symptoms: Improving:  \" I'm doing pretty well this afternoon.  I feel like I've been in a pretty good mood the last couple days.  I would guess it would have to do with the Adderall.  She increased the dosage to 20mg.\"    Homework: Partially completed      Episode of Care Goals: Satisfactory progress - ACTION (Actively working towards change); Intervened by reinforcing change plan / affirming steps taken     Current / " "Ongoing Stressors and Concerns:   Work; School (dropped)     Treatment Objective(s) Addressed in This Session:   Reviewed client's change in medication (Adderall) and any affect he is feeling  Discussed client's view of relationships, values in these relationships (non-judgmental, similar views or accepting/open to his views), explored caring about what others think/feel  Client is challenging his thought process when he stated he \"doesn't care what other's think.\"  Assigned homework.          Notes from 8/4/21 Session:   Increase dosage of Adderall; \"I get less tired and have more energy.\"   Slightly reduced appetite - \"Kind of like, its not that I don't get hungry, it's less urgent.  It feels smaller in scale.\"  Helping multi-task \"a bit more at work.  I still forget things but it's an improvement over what it was before.  I still have a hard time identifying when it wears off.\"  First signs of wearing off?  \"Maybe next week I'll have a clearer answer for you.\"  \"Last Tuesday, I didn't mention this during our session.  My parents were going to have company stay over night, so, I stayed late at work for three hours just to avoid them.  On Saturday and [place], [place] and [place] just went [place]ing on the lake, or tubing.  I feel like I've been doing a lot of things. \"  What's this mean to you?  \"I won't say I disliked it.  I probably won't do it again for a while.\"  Hanging out with family \"Is kind of fun but not really fulfilling.  I'm always conscious about how draining it is, even if I like who I'm with.  I drove up with my sister, so we had time to talk, I enjoyed that more than actually being up there.\"  \"She and I have a lot I common and understand where I'm coming from. And we share a lot of similar goals: emotional well-being and meditation like stuff.  We have similar perspectives on a lot of things.\"  Do you often feel misunderstood by people?  \" I would say yeah, that's probably accurate.  " "I think for a lot of people, not everybody, I've kind of given up on trying to make them see things the way I do. I know they don't see the world similar to me. I'd say my sister is the only one I can really talk to.  I can talk to my mother but I'd say there are certain things she doesn't really get. \"  How do you see the world?  \"My family has a very rigid world view. I don't believe in any kind of higher power or spiritual realm.  It's a very scientific perspective.  Everything needs proof or I need to see evidence of something.  How meditation comes into it is, I would say, there's a certain amount of 'solipsism,' since our brains are trapped inside our heads there's always a disconnect from outside.  People can have a very real experience even though it is happening inside their head. I don't think that's important, I think the personal impact is more important whether splitting hairs if something is possible or not.\"    HW: \"I've worked a lot of overtime and work and decided to do the homework this week.\"        Intervention:   CBT: Explored client's World View; Explored his experience with relationships; Socratic Questioning; Pattern recognition; Psychoeducation; Assigned homework  DBT: Checked in on client's comfort level using a [employer] Pros & Cons list  Active listening and other rapport building skills        ASSESSMENT: Current Emotional / Mental Status (status of significant symptoms):   Risk status (Self / Other harm or suicidal ideation)   Patient denies current fears or concerns for personal safety.   Patient denies current or recent suicidal ideation or behaviors.   Patient denies current or recent homicidal ideation or behaviors.   Patient denies current or recent self injurious behavior or ideation.   Patient denies other safety concerns.   Patient reports there has been no change in risk factors since their last session.     Patient reports there has been no change in protective factors since " "their last session.     Recommended that patient call 911 or go to the local ED should there be a change in any of these risk factors.     Appearance:   Appropriate    Eye Contact:   Fair  (tends to look away while thinking).   Psychomotor Behavior: Normal    Attitude:   Cooperative    Orientation:   All   Speech    Rate / Production: Normal/ Responsive Normal     Volume:  Normal    Mood:    Normal   Affect:    Appropriate    Thought Content:  Clear    Thought Form:  Coherent  Logical    Insight:    Fair      Medication Review:   Changes to psychiatric medications, see updated Medication List in EPIC.      Medication Compliance:   Yes     Changes in Health Issues:   None reported     Chemical Use Review:   Substance Use: Chemical use reviewed, no active concerns identified      Tobacco Use: No current tobacco use.      Diagnosis:  1. Attention-Deficit/Hyperactivity Disorder, Predominantly inattentive presentation    2. Major depressive disorder, recurrent episode, in partial remission (H)        Collateral Reports Completed:   Not Applicable    PLAN: (Patient Tasks / Therapist Tasks / Other)  Client will complete a [employer] Pros & Cons list on \"Working Out vs Not Working Out\" which he was unable to complete last week due to work.  His next scheduled appointment is August 12, 2021.        Farhat Russell                                                         ______________________________________________________________________    Treatment Plan    Patient's Name: Carrillo Aceves  YOB: 1995    Date: 12/16/20; Reviewed and Revised: 5/12/21    DSM5 Diagnoses: 296.35 (F33.41)  Major Depressive Disorder, Recurrent Episode, In partial remission _ and With anxious distress  Psychosocial / Contextual Factors: School  WHODAS: 32 (on 12/3/20)    Referral / Collaboration:  Referral to another professional/service is not indicated at this time.    Anticipated number of session or this episode of care: " "30      MeasurableTreatment Goal(s) related to diagnosis / functional impairment(s)  Goal 1: Patient will \"make a schedule for myself and whether or not I'm following that schedule.  I can identify areas I am completing and ones that I'm not.\"      I will know I've met my goal when I am completing scheduled activities on time and I'm scheduling as many activities as I can so I'm more aware of what I am doing every day.  A successful week is completing 50% of his scheduled activities in a week for two weeks.    Objective #A Client will create his weekly schedule on Sundays.   Patient will create a weekly To Do schedule.  Status: New - Date: 12/23/20     Intervention(s)  Therapist will assign homework as appropriate  Check on progress each session and help problem solve barriers to successfully completing this goal      Goal 2: Patient will develop a healthy sleep routine.    I will know I've met my goal when I can fall asleep consistently and I don't have a good reason to have disruptive sleep.  I would like to be able to fall asleep within ten minutes and be able to fall back asleep within ten minutes if I wake up in the middle of the night.      Objective #A Start to develop a sleep schedule (on hold for Objective #B)   Status: New - Date: 12/23/20     Patient will identify healthy sleep hygiene practices.    Intervention(s)  Therapist will assign homework as appropriate  teach about sleep hygiene/routine.    Objective #B  Patient will schedule three time per week to exercise.    Status: New - Date: 7/28/21     Intervention(s)  Therapist will assign homework as appropriate  teach skills on reaching goals.    Goal 3: Patient will \"learn to identify his emotional states.\"   Being able to identify my own emotional states.  At the end of each day record emotions throughout the day that I could identify.  To increase the range of emotions that I can identify.    Objective #A Client will list daily the emotions he was " able to identify he felt that day.    Status: New - Date: 5/12/21     Patient will list emotions he felt daily.    Intervention(s)  Therapist will assign homework as appropriate  teach about emotions and how to differentiate  Identify and problem solve barriers to reaching goal.        Patient has reviewed and agreed to the above plan.      Farhat Russell  August 4, 2021

## 2021-08-12 ENCOUNTER — VIRTUAL VISIT (OUTPATIENT)
Dept: PSYCHOLOGY | Facility: CLINIC | Age: 26
End: 2021-08-12
Payer: COMMERCIAL

## 2021-08-12 DIAGNOSIS — F90.0 ATTENTION DEFICIT HYPERACTIVITY DISORDER, INATTENTIVE TYPE: Primary | ICD-10-CM

## 2021-08-12 DIAGNOSIS — F33.41 MAJOR DEPRESSIVE DISORDER, RECURRENT EPISODE, IN PARTIAL REMISSION (H): ICD-10-CM

## 2021-08-12 PROCEDURE — 90832 PSYTX W PT 30 MINUTES: CPT | Mod: GT | Performed by: PSYCHOLOGIST

## 2021-08-12 NOTE — PROGRESS NOTES
"                                           Progress Note    Patient Name: Carrillo Aceves  Date: 8/12/21         Service Type: Individual      Session Start Time: 4:05pm Session End Time: 4:30pm     Session Length: 25 minutes     Session #: 30    Attendees: Client attended alone    Service Modality:  Video Visit:      Provider verified identity through the following two step process.  Patient provided:  Patient photo    Telemedicine Visit: The patient's condition can be safely assessed and treated via synchronous audio and visual telemedicine encounter.      Reason for Telemedicine Visit: Services only offered telehealth    Originating Site (Patient Location): Patient's home    Distant Site (Provider Location): Provider Remote Setting    Consent:  The patient/guardian has verbally consented to: the potential risks and benefits of telemedicine (video visit) versus in person care; bill my insurance or make self-payment for services provided; and responsibility for payment of non-covered services.     Patient would like the video invitation sent by: Send to e-mail at: [email or web address]    Mode of Communication:  Video Conference via Amwell    As the provider I attest to compliance with applicable laws and regulations related to telemedicine.     Treatment Plan Last Reviewed: 12/16/20; Reviewed: 5/12/21  PHQ-9 / DORY-7 : 14 / 10 (on 12/3/20); 12 / 9 (on 1/27/21); 10 / 5 (on 5/12/21)    DATA  Interactive Complexity: No  Crisis: No       Progress Since Last Session (Related to Symptoms / Goals / Homework):   Symptoms: Stable: \"I'm tired and feeling a little under the weather.\"    Homework: Did not complete      Episode of Care Goals: Satisfactory progress - ACTION (Actively working towards change); Intervened by reinforcing change plan / affirming steps taken     Current / Ongoing Stressors and Concerns:   Work     Treatment Objective(s) Addressed in This Session:   Reviewed client's change in medication (Adderall) and any " "effects he is feeling; Explored impact on his daily living  Class also explored the impact of work burnout has on him and may have had on completing school this Summer, Expressed wished to \"titrate off Effexor.\"         Notes from 8/12/21 Session:   Client stated he wanted to keep today short, \"I'm feeling a bit under the weather.\"   He realized he \"may be getting burnt out at work.  I took some time off next week and the  following week.\"   \"Working so much overtime since March may have played a role in not keeping up with  my English class this past semester.\"   Checked in with client on burnout.  Understands a few days \"won't cure burnout.  I'm just  going to have to cut back in hours.\"   Due to burnout client did not work on homework again this week   \"Since increasing dosage on Adderall, I've noticed a big reduction in the sense of really  uncomfortable boredom, like at work.  About 15-20 minutes after it kicks in, I can feel it in  the morning, it seems to boost my mood.  A general feeling of feeling pretty good. I'm still  not sure when it wears out.\"   \"I've been thinking about tapering of venlafaxine (Effexor) to see if my anxiety was due to  ADHD.\"  He will talk to his prescribing professional on 8/23/21.        Intervention:   CBT: Reframe; Socratic Questioning; Pattern recognition; Psychoeducation; Assigned homework  DBT: Checked in on client's comfort level using a [employer] Pros [employer] Cons list  Active listening, validation, encouragement, and other rapport building skills; Collaboration with client's prescribing provider        ASSESSMENT: Current Emotional / Mental Status (status of significant symptoms):   Risk status (Self / Other harm or suicidal ideation)   Patient denies current fears or concerns for personal safety.   Patient denies current or recent suicidal ideation or behaviors.   Patient denies current or recent homicidal ideation or behaviors.   Patient denies current or recent self " "injurious behavior or ideation.   Patient denies other safety concerns.   Patient reports there has been no change in risk factors since their last session.     Patient reports there has been no change in protective factors since their last session.     Recommended that patient call 911 or go to the local ED should there be a change in any of these risk factors.     Appearance:   Appropriate    Eye Contact:   Fair  (tends to look away while thinking).   Psychomotor Behavior: Normal    Attitude:   Cooperative    Orientation:   All   Speech    Rate / Production: Normal/ Responsive Normal     Volume:  Normal    Mood:    Normal   Affect:    Appropriate    Thought Content:  Clear    Thought Form:  Coherent  Logical    Insight:    Fair      Medication Review:   Changes to psychiatric medications, see updated Medication List in EPIC.      Medication Compliance:   Yes     Changes in Health Issues:   None reported     Chemical Use Review:   Substance Use: Chemical use reviewed, no active concerns identified      Tobacco Use: No current tobacco use.      Diagnosis:  1. Attention-Deficit/Hyperactivity Disorder, Predominantly inattentive presentation    2. Major depressive disorder, recurrent episode, in partial remission (H)        Collateral Reports Completed:   Not Applicable    PLAN: (Patient Tasks / Therapist Tasks / Other)  Client will complete a [employer] Pros & Cons list on \"Working Out vs Not Working Out\" which he was unable to complete last week due to work.  His next scheduled appointment is August 12, 2021.        Farhat Russell                                                         ______________________________________________________________________    Treatment Plan    Patient's Name: Carrillo Aceves  YOB: 1995    Date: 12/16/20; Reviewed and Revised: 5/12/21    DSM5 Diagnoses: 296.35 (F33.41)  Major Depressive Disorder, Recurrent Episode, In partial remission _ and With anxious " "distress  Psychosocial / Contextual Factors: School  WHODAS: 32 (on 12/3/20)    Referral / Collaboration:  Referral to another professional/service is not indicated at this time.    Anticipated number of session or this episode of care: 30      MeasurableTreatment Goal(s) related to diagnosis / functional impairment(s)  Goal 1: Patient will \"make a schedule for myself and whether or not I'm following that schedule.  I can identify areas I am completing and ones that I'm not.\"      I will know I've met my goal when I am completing scheduled activities on time and I'm scheduling as many activities as I can so I'm more aware of what I am doing every day.  A successful week is completing 50% of his scheduled activities in a week for two weeks.    Objective #A Client will create his weekly schedule on Sundays.   Patient will create a weekly To Do schedule.  Status: New - Date: 12/23/20     Intervention(s)  Therapist will assign homework as appropriate  Check on progress each session and help problem solve barriers to successfully completing this goal      Goal 2: Patient will develop a healthy sleep routine.    I will know I've met my goal when I can fall asleep consistently and I don't have a good reason to have disruptive sleep.  I would like to be able to fall asleep within ten minutes and be able to fall back asleep within ten minutes if I wake up in the middle of the night.      Objective #A Start to develop a sleep schedule (on hold for Objective #B)   Status: New - Date: 12/23/20     Patient will identify healthy sleep hygiene practices.    Intervention(s)  Therapist will assign homework as appropriate  teach about sleep hygiene/routine.    Objective #B  Patient will schedule three time per week to exercise.    Status: New - Date: 7/28/21     Intervention(s)  Therapist will assign homework as appropriate  teach skills on reaching goals.    Goal 3: Patient will \"learn to identify his emotional states.\"   Being able " to identify my own emotional states.  At the end of each day record emotions throughout the day that I could identify.  To increase the range of emotions that I can identify.    Objective #A Client will list daily the emotions he was able to identify he felt that day.    Status: New - Date: 5/12/21     Patient will list emotions he felt daily.    Intervention(s)  Therapist will assign homework as appropriate  teach about emotions and how to differentiate  Identify and problem solve barriers to reaching goal.        Patient has reviewed and agreed to the above plan.      Farhat Russell  August 12, 2021

## 2021-08-24 NOTE — PROGRESS NOTES
Carrillo is a 26 year old who is being evaluated via a billable video visit.      How would you like to obtain your AVS? MyChart  If the video visit is dropped, the invitation should be resent by: Text to cell phone: [phone]  Will anyone else be joining your video visit? No      Video Start Time: 3:40pm    Assessment & Plan     Attention deficit hyperactivity disorder (ADHD), predominantly inattentive type  Noticing subtle improvement   Improved anxiety sx since starting adderall  He would like to try higher dose. Current 1x daily dosing lasting 8-9hr - not into evening. Instead of BID dosing he would like to shift to taking a bit later in the morning so effect lasts into early evening.   recheck in 3-4 weeks  - amphetamine-dextroamphetamine (ADDERALL XR) 30 MG 24 hr capsule; Take 1 capsule (30 mg) by mouth daily    Mild major depression (H)  DORY (generalized anxiety disorder)  Carrillo feels like his anxiety is better since being on adderall.   His mood is stable.  When adderall dose is stable, he would like to try going down on venlafaxine dose.       Follow Up: The patient was instructed to contact clinic for worsening symptoms, non-improvement in time frame as expected/discussed, and for questions regarding medications or treatment plan. For virtual visits, the patient was advised to be seen for in person evaluation if symptoms or condition are worsening or non-improvement as expected.       Return in about 4 weeks (around 9/23/2021).    NICK Wren [facility] GISELLA Vizcaino is a 26 year old who presents for the following health issues     History of Present Illness       He eats 0-1 servings of fruits and vegetables daily.He consumes 0 sweetened beverage(s) daily.He exercises with enough effort to increase his heart rate 20 to 29 minutes per day.  He exercises with enough effort to increase his heart rate 5 days per week.   He is taking medications regularly.    "    SUBJECTIVE:  Patient is here today to recheck ADHD/ADD.    Updates since last visit: better  Routine for taking medicine, including time: 9am  Time medicine wears off: 4-5pm   Issues at school/Work/home: keeping things organized and getting things done  Control of symptoms: feel like it is helping but could still be better \"not a night and day change\"    Side effects:  Headaches: No  Stomach aches: No  Irritability/mood swings: No  Difficulties with sleep: No  Social withdrawal: No  Unusual movements/tics: No  Decreased appetite: Yes - but still eating 3 meals    Other concerns: none    Last visit increased dose from adderall XR 10mg to XR 20mg.   He does notice improvement but its \"not night and day\". Its more subtle.  He thinks that a lot of the anxiety sx that the venlafaxine was targeting have been better with the adderall instead of the venlafaxine.     Concentration is better. More motivated. Accomplishing things. Used to have a \"low level boredom that was painful\" that seems to be gone.     He would like to try a higher dose.     He has not had significant side effects    Mood is stable.     He thinks med wears off by time her gets home. Not lasting into the evening for home work or evening tasks.     Review of Systems   Constitutional, HEENT, cardiovascular, pulmonary, gi and gu systems are negative, except as otherwise noted.      Objective           Vitals:  No vitals were obtained today due to virtual visit.    Physical Exam   GENERAL: Healthy, alert and no distress  EYES: Eyes grossly normal to inspection.  No discharge or erythema, or obvious scleral/conjunctival abnormalities.  HENT: Normal cephalic/atraumatic.  External ears, nose and mouth without ulcers or lesions.  No nasal drainage visible.  RESP: No audible wheeze, cough, or visible cyanosis.  No visible retractions or increased work of breathing.    SKIN: Visible skin clear. No significant rash, abnormal pigmentation or lesions.  NEURO: " Cranial nerves grossly intact.  Mentation and speech appropriate for age.  PSYCH: Mentation appears normal, affect normal/bright, judgement and insight intact, normal speech and appearance well-groomed.              Video-Visit Details    Type of service:  Video Visit    Video End Time: 3:57pm    Originating Location (pt. Location): Home    Distant Location (provider location):  [facility]     Platform used for Video Visit: Brickfish  Answers for HPI/ROS submitted by the patient on 8/26/2021  If you checked off any problems, how difficult have these problems made it for you to do your work, take care of things at home, or get along with other people?: Somewhat difficult  PHQ9 TOTAL SCORE: 7

## 2021-08-26 ENCOUNTER — VIRTUAL VISIT (OUTPATIENT)
Dept: FAMILY MEDICINE | Facility: CLINIC | Age: 26
End: 2021-08-26
Payer: COMMERCIAL

## 2021-08-26 DIAGNOSIS — F41.1 GAD (GENERALIZED ANXIETY DISORDER): ICD-10-CM

## 2021-08-26 DIAGNOSIS — F90.0 ATTENTION DEFICIT HYPERACTIVITY DISORDER (ADHD), PREDOMINANTLY INATTENTIVE TYPE: Primary | ICD-10-CM

## 2021-08-26 DIAGNOSIS — F32.0 MILD MAJOR DEPRESSION (H): ICD-10-CM

## 2021-08-26 PROCEDURE — 99214 OFFICE O/P EST MOD 30 MIN: CPT | Mod: GT | Performed by: PHYSICIAN ASSISTANT

## 2021-08-26 RX ORDER — DEXTROAMPHETAMINE SACCHARATE, AMPHETAMINE ASPARTATE MONOHYDRATE, DEXTROAMPHETAMINE SULFATE AND AMPHETAMINE SULFATE 7.5; 7.5; 7.5; 7.5 MG/1; MG/1; MG/1; MG/1
30 CAPSULE, EXTENDED RELEASE ORAL DAILY
Qty: 30 CAPSULE | Refills: 0 | Status: SHIPPED | OUTPATIENT
Start: 2021-08-26 | End: 2021-10-07

## 2021-08-26 ASSESSMENT — PATIENT HEALTH QUESTIONNAIRE - PHQ9
SUM OF ALL RESPONSES TO PHQ QUESTIONS 1-9: 7
SUM OF ALL RESPONSES TO PHQ QUESTIONS 1-9: 7
10. IF YOU CHECKED OFF ANY PROBLEMS, HOW DIFFICULT HAVE THESE PROBLEMS MADE IT FOR YOU TO DO YOUR WORK, TAKE CARE OF THINGS AT HOME, OR GET ALONG WITH OTHER PEOPLE: SOMEWHAT DIFFICULT

## 2021-08-30 ENCOUNTER — VIRTUAL VISIT (OUTPATIENT)
Dept: PSYCHOLOGY | Facility: CLINIC | Age: 26
End: 2021-08-30
Payer: COMMERCIAL

## 2021-08-30 DIAGNOSIS — F90.0 ATTENTION DEFICIT HYPERACTIVITY DISORDER, INATTENTIVE TYPE: Primary | ICD-10-CM

## 2021-08-30 DIAGNOSIS — F33.41 MAJOR DEPRESSIVE DISORDER, RECURRENT EPISODE, IN PARTIAL REMISSION (H): ICD-10-CM

## 2021-08-30 PROCEDURE — 90834 PSYTX W PT 45 MINUTES: CPT | Mod: GT | Performed by: PSYCHOLOGIST

## 2021-08-30 NOTE — PROGRESS NOTES
"                                           Progress Note    Patient Name: Carrillo Aceves  Date: 8/30/21         Service Type: Individual      Session Start Time: 4:00pm Session End Time: 4:52pm     Session Length: 52 minutes     Session #: 31    Attendees: Client attended alone    Service Modality:  Video Visit:      Provider verified identity through the following two step process.  Patient provided:  Patient photo    Telemedicine Visit: The patient's condition can be safely assessed and treated via synchronous audio and visual telemedicine encounter.      Reason for Telemedicine Visit: Services only offered telehealth    Originating Site (Patient Location): Patient's home    Distant Site (Provider Location): Provider Remote Setting    Consent:  The patient/guardian has verbally consented to: the potential risks and benefits of telemedicine (video visit) versus in person care; bill my insurance or make self-payment for services provided; and responsibility for payment of non-covered services.     Patient would like the video invitation sent by: Send to e-mail at: [email or web address]    Mode of Communication:  Video Conference via Amwell    As the provider I attest to compliance with applicable laws and regulations related to telemedicine.     Treatment Plan Last Reviewed: 12/16/20; Reviewed: 5/12/21  PHQ-9 / DORY-7 : 14 / 10 (on 12/3/20); 12 / 9 (on 1/27/21); 10 / 5 (on 5/12/21)    DATA  Interactive Complexity: No  Crisis: No       Progress Since Last Session (Related to Symptoms / Goals / Homework):   Symptoms: Stable: \"Feeling pretty good.  Last week was a rough week; I was sick all week.\"    Homework: Did not complete      Episode of Care Goals: Satisfactory progress - ACTION (Actively working towards change); Intervened by reinforcing change plan / affirming steps taken     Current / Ongoing Stressors and Concerns:   Work; been sick     Treatment Objective(s) Addressed in This Session:   Reviewed client's change in " "medication (Adderall) and any effects he is feeling; Explored impact on his daily living and school.  Discussed client's school workload, strategies to use to complete the class, and future vocational aspirations.       Notes from 8/12/21 Session:   \"I went from 20mg - 30mg on Sunday, definitely notices a difference.\"   He experiences a difference with \"being able to focus my eyes on something for longer.   It's better and I can look at something for a few seconds without feeling that discomfort.\"   \"The things that have helped me the most at work is less fatigue and less of that really  uncomfortable boredom.\"   Classes started last week, technically, but nothing was assigned.  So, what I'm planning  to do, it so write up a schedule for the semester on which days I'm going to work on  certain things.\"   What are you going to use?  \"I think I might use Excel or something.\"   What I wanted to get done today is read all the announcements and reading the syllabus  (for school).   He did talk to his prescribing professional about \"tapering off of Effexor.  We decided if  30mg helps (Adderall) then we'll talk about cutting down on Effexor.\"   \"Now that Adderall seems to be helping me with fatigue and concentration, I'm not sure if  the Effexor is just kind of covering up useful anxieties that I might have or doing more than  just damping down on anxiety that may be effecting my motivation.\"   \"I feel like I have more motivation and concentration.\"   Discussed homework strategies   \"Last time we talked about how I felt about being diagnosed with ADHD and I think it's  starting to percolate in the corners of my brain.  That certain things like feeling anxiety  may be more about ADHD.   Brought up the polarity of having a late diagnosis and feeling some relief vs feeling  frustration due to not being diagnosed earlier in life.   Reframed client viewing \"being behind two years now.\"         Intervention:   CBT: Problem " solving; Reframe; Socratic Questioning; Pattern recognition; Psychoeducation; Assigned homework  Skill identification (planning); Proactive planning; Active listening, validation, and other rapport building skills        ASSESSMENT: Current Emotional / Mental Status (status of significant symptoms):   Risk status (Self / Other harm or suicidal ideation)   Patient denies current fears or concerns for personal safety.   Patient denies current or recent suicidal ideation or behaviors.   Patient denies current or recent homicidal ideation or behaviors.   Patient denies current or recent self injurious behavior or ideation.   Patient denies other safety concerns.   Patient reports there has been no change in risk factors since their last session.     Patient reports there has been no change in protective factors since their last session.     Recommended that patient call 911 or go to the local ED should there be a change in any of these risk factors.     Appearance:   Appropriate    Eye Contact:   Fair  (tends to look away while thinking).   Psychomotor Behavior: Normal    Attitude:   Cooperative    Orientation:   All   Speech    Rate / Production: Normal/ Responsive Normal     Volume:  Normal    Mood:    Normal   Affect:    Appropriate    Thought Content:  Clear    Thought Form:  Coherent  Logical    Insight:    Fair      Medication Review:   Changes to psychiatric medications, see updated Medication List in EPIC.      Medication Compliance:   Yes     Changes in Health Issues:   None reported     Chemical Use Review:   Substance Use: Chemical use reviewed, no active concerns identified      Tobacco Use: No current tobacco use.      Diagnosis:  1. Attention-Deficit/Hyperactivity Disorder, Predominantly inattentive presentation    2. Major depressive disorder, recurrent episode, in partial remission (H)        Collateral Reports Completed:   Not Applicable    PLAN: (Patient Tasks / Therapist Tasks / Other)  Client will  "read the syllabus and school messages tonight.  Tomorrow he wants to read the syllabus and plan it out on an Excel callendar.  His next scheduled appointment is September 8, 2021.        Farhat Russell                                                         ______________________________________________________________________    Treatment Plan    Patient's Name: Carrillo Aceves  YOB: 1995    Date: 12/16/20; Reviewed and Revised: 5/12/21    DSM5 Diagnoses: 296.35 (F33.41)  Major Depressive Disorder, Recurrent Episode, In partial remission _ and With anxious distress  Psychosocial / Contextual Factors: School  WHODAS: 32 (on 12/3/20)    Referral / Collaboration:  Referral to another professional/service is not indicated at this time.    Anticipated number of session or this episode of care: 30      MeasurableTreatment Goal(s) related to diagnosis / functional impairment(s)  Goal 1: Patient will \"make a schedule for myself and whether or not I'm following that schedule.  I can identify areas I am completing and ones that I'm not.\"      I will know I've met my goal when I am completing scheduled activities on time and I'm scheduling as many activities as I can so I'm more aware of what I am doing every day.  A successful week is completing 50% of his scheduled activities in a week for two weeks.    Objective #A Client will create his weekly schedule on Sundays.   Patient will create a weekly To Do schedule.  Status: New - Date: 12/23/20     Intervention(s)  Therapist will assign homework as appropriate  Check on progress each session and help problem solve barriers to successfully completing this goal      Goal 2: Patient will develop a healthy sleep routine.    I will know I've met my goal when I can fall asleep consistently and I don't have a good reason to have disruptive sleep.  I would like to be able to fall asleep within ten minutes and be able to fall back asleep within ten minutes if I wake up " "in the middle of the night.      Objective #A Start to develop a sleep schedule (on hold for Objective #B)   Status: New - Date: 12/23/20     Patient will identify healthy sleep hygiene practices.    Intervention(s)  Therapist will assign homework as appropriate  teach about sleep hygiene/routine.    Objective #B  Patient will schedule three time per week to exercise.    Status: New - Date: 7/28/21     Intervention(s)  Therapist will assign homework as appropriate  teach skills on reaching goals.    Goal 3: Patient will \"learn to identify his emotional states.\"   Being able to identify my own emotional states.  At the end of each day record emotions throughout the day that I could identify.  To increase the range of emotions that I can identify.    Objective #A Client will list daily the emotions he was able to identify he felt that day.    Status: New - Date: 5/12/21     Patient will list emotions he felt daily.    Intervention(s)  Therapist will assign homework as appropriate  teach about emotions and how to differentiate  Identify and problem solve barriers to reaching goal.        Patient has reviewed and agreed to the above plan.      Farhat Russell  August 30, 2021  "

## 2021-09-15 ENCOUNTER — VIRTUAL VISIT (OUTPATIENT)
Dept: PSYCHOLOGY | Facility: CLINIC | Age: 26
End: 2021-09-15
Payer: COMMERCIAL

## 2021-09-15 DIAGNOSIS — F33.41 MAJOR DEPRESSIVE DISORDER, RECURRENT EPISODE, IN PARTIAL REMISSION (H): ICD-10-CM

## 2021-09-15 DIAGNOSIS — F90.0 ATTENTION DEFICIT HYPERACTIVITY DISORDER, INATTENTIVE TYPE: Primary | ICD-10-CM

## 2021-09-15 PROCEDURE — 90834 PSYTX W PT 45 MINUTES: CPT | Mod: GT | Performed by: PSYCHOLOGIST

## 2021-09-15 ASSESSMENT — PATIENT HEALTH QUESTIONNAIRE - PHQ9
SUM OF ALL RESPONSES TO PHQ QUESTIONS 1-9: 6
10. IF YOU CHECKED OFF ANY PROBLEMS, HOW DIFFICULT HAVE THESE PROBLEMS MADE IT FOR YOU TO DO YOUR WORK, TAKE CARE OF THINGS AT HOME, OR GET ALONG WITH OTHER PEOPLE: SOMEWHAT DIFFICULT
SUM OF ALL RESPONSES TO PHQ QUESTIONS 1-9: 6

## 2021-09-15 NOTE — PROGRESS NOTES
"                                           Progress Note    Patient Name: Carrillo Aceves  Date: 9/15/21         Service Type: Individual      Session Start Time: 4:02pm Session End Time: 4:48pm     Session Length: 46 minutes     Session #: 32    Attendees: Client attended alone    Service Modality:  Video Visit:      Provider verified identity through the following two step process.  Patient provided:  Patient photo    Telemedicine Visit: The patient's condition can be safely assessed and treated via synchronous audio and visual telemedicine encounter.      Reason for Telemedicine Visit: Services only offered telehealth    Originating Site (Patient Location): Patient's home    Distant Site (Provider Location): Provider Remote Setting    Consent:  The patient/guardian has verbally consented to: the potential risks and benefits of telemedicine (video visit) versus in person care; bill my insurance or make self-payment for services provided; and responsibility for payment of non-covered services.     Patient would like the video invitation sent by: Send to e-mail at: [email or web address]    Mode of Communication:  Video Conference via [employer]    As the provider I attest to compliance with applicable laws and regulations related to telemedicine.     Treatment Plan Last Reviewed: 12/16/20; Reviewed: 5/12/21  PHQ-9 / DORY-7 : 14 / 10 (on 12/3/20); 12 / 9 (on 1/27/21); 10 / 5 (on 5/12/21)    DATA  Interactive Complexity: No  Crisis: No       Progress Since Last Session (Related to Symptoms / Goals / Homework):   Symptoms: Stable: \"Feeling better. I took myself off of my Effexor and have been feeling the withdrawal.\"    Homework: Did not complete      Episode of Care Goals: Satisfactory progress - ACTION (Actively working towards change); Intervened by reinforcing change plan / affirming steps taken     Current / Ongoing Stressors and Concerns:   Work; been sick     Treatment Objective(s) Addressed in This Session:   Client " "discussed and explored the changes he's observed in his behaviors, thoughts, and feelings since \"stopping Effexor.\"  Discused the changes and the importance to work in concert with his doctors to make medication changes.  Discussed client's school workload and future plans for therapy          Notes from 9/15/21 Session:   \"Client took himself off of his Effexor.  I felt I was losing interesting in doing things, even  small assignments in school.\"   Since he has recovered from taking himself off of Effexor he has regained interest in  doing things again.  I went about a week between showers.\"   \"I felt like my head was empty.  When you're thinking about something, you expect related  thoughts to kind of pop up on their own.  Now (w/Adderall) I'm feeling like there's a lot  more thoughts coming into my head.  I've been a lot more engaged in what's going on. \"   \"I was starting to be late for work, upwards of 30 minutes.  I had, basically, zero motivation  to do school work or taking care of myself in general.  It was kind of absent.  I had  completely lost interest in going back to college.\"     How is school going now?  \"This will be the third week since it started.  I haven't done  much toward it so far. Two weeks ago I had no interest or motivation at all to finish the  class.  Last week I was feeling so miserable it wasn't that important to me. It's only the  last day or two I felt my desire to finish my two year degree.\"      \"Another reason I wanted to stop taking Effexor is that I really couldn't remember why I     was taking it in the first place.\"      Client has a quiz due today for his class with reading due first.  He is confident he can do  the reading and take the quiz.      Client wants to wait until the Effexor is out of his system before making new goals and  plans; he wants to establish a new baseline.  \"I want to see where I land now before  deciding what goals I want to make.\"      HW: Meet with " "PA regarding medication changes, get to new baseline, and discuss new  goals next session.  He will also keep up with homework.      \"I feel like I've done more things in the past few days then the past month.         Intervention:   CBT: Verbal Pros & Cons (of Effexor); Problem solving; Reframe; Socratic Questioning; Pattern recognition; Psychoeducation; Assigned homework  Proactive planning; Active listening, clarifying questions, validation, and other rapport building skills; Encouraged collaboration with client's prescribing professional        ASSESSMENT: Current Emotional / Mental Status (status of significant symptoms):   Risk status (Self / Other harm or suicidal ideation)   Patient denies current fears or concerns for personal safety.   Patient denies current or recent suicidal ideation or behaviors.   Patient denies current or recent homicidal ideation or behaviors.   Patient denies current or recent self injurious behavior or ideation.   Patient denies other safety concerns.   Patient reports there has been no change in risk factors since their last session.     Patient reports there has been no change in protective factors since their last session.     Recommended that patient call 911 or go to the local ED should there be a change in any of these risk factors.     Appearance:   Appropriate    Eye Contact:   Fair  (tends to look away while thinking).   Psychomotor Behavior: Normal    Attitude:   Cooperative    Orientation:   All   Speech    Rate / Production: Normal/ Responsive Normal     Volume:  Normal    Mood:    Normal   Affect:    Appropriate    Thought Content:  Clear    Thought Form:  Coherent  Logical    Insight:    Fair      Medication Review:   Changes to psychiatric medications, see updated Medication List in EPIC.      Medication Compliance:   Yes     Changes in Health Issues:   None reported     Chemical Use Review:   Substance Use: Chemical use reviewed, no active concerns identified " "     Tobacco Use: No current tobacco use.      Diagnosis:  1. Attention-Deficit/Hyperactivity Disorder, Predominantly inattentive presentation    2. Major depressive disorder, recurrent episode, in partial remission (H)        Collateral Reports Completed:   Not Applicable    PLAN: (Patient Tasks / Therapist Tasks / Other)  Client will meet with PA regarding medication changes (Thursday, 9/16/21), get to new baseline (after stopping Effexor), and discuss new goals next session.  He will also keep up with homework at school.  His next scheduled appointment is September 22, 2021.        Farhat Marroquin Drew                                                         ______________________________________________________________________    Treatment Plan    Patient's Name: Carrillo Aceves  YOB: 1995    Date: 12/16/20; Reviewed and Revised: 5/12/21    DSM5 Diagnoses: 296.35 (F33.41)  Major Depressive Disorder, Recurrent Episode, In partial remission _ and With anxious distress  Psychosocial / Contextual Factors: School  WHODAS: 32 (on 12/3/20)    Referral / Collaboration:  Referral to another professional/service is not indicated at this time.    Anticipated number of session or this episode of care: 30      MeasurableTreatment Goal(s) related to diagnosis / functional impairment(s)  Goal 1: Patient will \"make a schedule for myself and whether or not I'm following that schedule.  I can identify areas I am completing and ones that I'm not.\"      I will know I've met my goal when I am completing scheduled activities on time and I'm scheduling as many activities as I can so I'm more aware of what I am doing every day.  A successful week is completing 50% of his scheduled activities in a week for two weeks.    Objective #A Client will create his weekly schedule on Sundays.   Patient will create a weekly To Do schedule.  Status: New - Date: 12/23/20     Intervention(s)  Therapist will assign homework as " "appropriate  Check on progress each session and help problem solve barriers to successfully completing this goal      Goal 2: Patient will develop a healthy sleep routine.    I will know I've met my goal when I can fall asleep consistently and I don't have a good reason to have disruptive sleep.  I would like to be able to fall asleep within ten minutes and be able to fall back asleep within ten minutes if I wake up in the middle of the night.      Objective #A Start to develop a sleep schedule (on hold for Objective #B)   Status: New - Date: 12/23/20     Patient will identify healthy sleep hygiene practices.    Intervention(s)  Therapist will assign homework as appropriate  teach about sleep hygiene/routine.    Objective #B  Patient will schedule three time per week to exercise.    Status: New - Date: 7/28/21     Intervention(s)  Therapist will assign homework as appropriate  teach skills on reaching goals.    Goal 3: Patient will \"learn to identify his emotional states.\"   Being able to identify my own emotional states.  At the end of each day record emotions throughout the day that I could identify.  To increase the range of emotions that I can identify.    Objective #A Client will list daily the emotions he was able to identify he felt that day.    Status: New - Date: 5/12/21     Patient will list emotions he felt daily.    Intervention(s)  Therapist will assign homework as appropriate  teach about emotions and how to differentiate  Identify and problem solve barriers to reaching goal.        Patient has reviewed and agreed to the above plan.      Farhat Russell  September 15, 2021  "

## 2021-09-16 ENCOUNTER — VIRTUAL VISIT (OUTPATIENT)
Dept: FAMILY MEDICINE | Facility: CLINIC | Age: 26
End: 2021-09-16
Payer: COMMERCIAL

## 2021-09-16 DIAGNOSIS — F90.0 ATTENTION DEFICIT HYPERACTIVITY DISORDER (ADHD), PREDOMINANTLY INATTENTIVE TYPE: Primary | ICD-10-CM

## 2021-09-16 DIAGNOSIS — F41.1 GAD (GENERALIZED ANXIETY DISORDER): ICD-10-CM

## 2021-09-16 DIAGNOSIS — F32.0 MILD MAJOR DEPRESSION (H): ICD-10-CM

## 2021-09-16 PROCEDURE — 99214 OFFICE O/P EST MOD 30 MIN: CPT | Mod: GT | Performed by: PHYSICIAN ASSISTANT

## 2021-09-16 RX ORDER — DEXTROAMPHETAMINE SACCHARATE, AMPHETAMINE ASPARTATE MONOHYDRATE, DEXTROAMPHETAMINE SULFATE AND AMPHETAMINE SULFATE 7.5; 7.5; 7.5; 7.5 MG/1; MG/1; MG/1; MG/1
30 CAPSULE, EXTENDED RELEASE ORAL DAILY
Qty: 30 CAPSULE | Refills: 0 | Status: SHIPPED | OUTPATIENT
Start: 2021-09-25 | End: 2021-10-12 | Stop reason: SINTOL

## 2021-09-16 ASSESSMENT — ANXIETY QUESTIONNAIRES
7. FEELING AFRAID AS IF SOMETHING AWFUL MIGHT HAPPEN: NOT AT ALL
2. NOT BEING ABLE TO STOP OR CONTROL WORRYING: NOT AT ALL
GAD7 TOTAL SCORE: 2
3. WORRYING TOO MUCH ABOUT DIFFERENT THINGS: NOT AT ALL
4. TROUBLE RELAXING: SEVERAL DAYS
7. FEELING AFRAID AS IF SOMETHING AWFUL MIGHT HAPPEN: NOT AT ALL
6. BECOMING EASILY ANNOYED OR IRRITABLE: NOT AT ALL
GAD7 TOTAL SCORE: 2
5. BEING SO RESTLESS THAT IT IS HARD TO SIT STILL: SEVERAL DAYS
GAD7 TOTAL SCORE: 2
1. FEELING NERVOUS, ANXIOUS, OR ON EDGE: NOT AT ALL
8. IF YOU CHECKED OFF ANY PROBLEMS, HOW DIFFICULT HAVE THESE MADE IT FOR YOU TO DO YOUR WORK, TAKE CARE OF THINGS AT HOME, OR GET ALONG WITH OTHER PEOPLE?: NOT DIFFICULT AT ALL

## 2021-09-16 ASSESSMENT — PATIENT HEALTH QUESTIONNAIRE - PHQ9: SUM OF ALL RESPONSES TO PHQ QUESTIONS 1-9: 6

## 2021-09-16 NOTE — PROGRESS NOTES
Carrillo is a 26 year old who is being evaluated via a billable video visit.      How would you like to obtain your AVS? MyChart  If the video visit is dropped, the invitation should be resent by: Text to cell phone: [phone]   Will anyone else be joining your video visit? No      Video Start Time: 4:24 PM    Assessment & Plan     Attention deficit hyperactivity disorder (ADHD), predominantly inattentive type  Refilled present dose.   Carrillo would like to check in for follow upin 3 weeks.   I did not notice any eye twitching during video visit. Likely side effect of all med changes or stress related. If worsening recheck in clinic. If persisting- discussed dropping dose to 25mg  - amphetamine-dextroamphetamine (ADDERALL XR) 30 MG 24 hr capsule; Take 1 capsule (30 mg) by mouth daily    Mild major depression (H)  DORY (generalized anxiety disorder)  He abruptly stopped his venlafaxine 10 days ago.   Advised against making med changes w/o notifying prescribing team in the future  Withdrawal side effects resolving.  He feels that he is feeling better since stopping the venlafaxine.   Discussed monitoring mood and anxiety closely in coming weeks   He does not want to restart or try a new medication as he is feeling improved.   Have tried multiple other meds in the past with uncertain benefits for mood/anxiety. Discussed GeneSight testing as option if needed. He would be interested        Follow Up: The patient was instructed to contact clinic for worsening symptoms, non-improvement in time frame as expected/discussed, and for questions regarding medications or treatment plan. For virtual visits, the patient was advised to be seen for in person evaluation if symptoms or condition are worsening or non-improvement as expected.       Return in about 3 weeks (around 10/7/2021).    NICK Wren [facility] GISELLA Vizcaino is a 26 year old who presents for the following health  "issues    History of Present Illness       Mental Health Follow-up:  Patient presents to follow-up on Depression.Patient's depression since last visit has been:  Better  The patient is not having other symptoms associated with depression.      Any significant life events: No  Patient is not feeling anxious or having panic attacks.  Patient has no concerns about alcohol or drug use.     Social History  Tobacco Use    Smoking status: Never Smoker    Smokeless tobacco: Never Used  Vaping Use    Vaping Use: Never used  Alcohol use: No    Comment: none for past 2 years (as of 01/2019)  Drug use: No      Today's PHQ-9         PHQ-9 Total Score:     (P) 6   PHQ-9 Q9 Thoughts of better off dead/self-harm past 2 weeks :   (P) Not at all   Thoughts of suicide or self harm:      Self-harm Plan:        Self-harm Action:          Safety concerns for self or others:           He eats 0-1 servings of fruits and vegetables daily.He consumes 0 sweetened beverage(s) daily.He exercises with enough effort to increase his heart rate 20 to 29 minutes per day.  He exercises with enough effort to increase his heart rate 5 days per week. He is missing 7 dose(s) of medications per week.  He is not taking prescribed medications regularly due to other.     Didn't realize he was out of refills on his venlafaxine 150mg and ran out about 10d ago. He decided not to contact the clinic and to just stop it. He has wanted to stop the venlafaxine now that he is on the adderall . Thinking he may not need it.     Side effects from abruptly stopping: Dizziness, fatigue, loose stools, fatigue. He missed a few days of work.      The side effects of resolving have gotten better.     He thinks his mood is \"a lot better\" since stopping the venlafaxine.     He had been feeling \"blank\" and that feeling is gone    Denies depressed feeling or SI.    Was having a hard time waking up in the morning- that got better last week.     He has had a slight increase in " "anxiety but doesn't feel debilitating. Seems to be alleviated with the adderall. Anxiety social situation related or \"task anxiety\".    Falling asleep in 20-30min. Sometimes wakes 30min prior to alarm but no middle of night waking. Waking feeling more rested.     Adderall XR- took at 11am. Feels it is still effective now. Does not think dose is too high but has had many overlapping feelings with abruptly stopping his venlafaxine.     On his visit with psychologist  ,  noticed his left eye was twitching. He wanted to bring it up.         PHQ 8/23/2021 8/26/2021 9/15/2021   PHQ-9 Total Score 12 7 6   Q9: Thoughts of better off dead/self-harm past 2 weeks Not at all Not at all Not at all     DORY-7 SCORE 8/2/2021 8/23/2021 9/16/2021   Total Score - - -   Total Score 3 (minimal anxiety) 3 (minimal anxiety) 2 (minimal anxiety)   Total Score 3 3 2             Review of Systems   Constitutional, HEENT, cardiovascular, pulmonary, gi and gu systems are negative, except as otherwise noted.      Objective           Vitals:  No vitals were obtained today due to virtual visit.    Physical Exam   GENERAL: Healthy, alert and no distress  EYES: Eyes grossly normal to inspection.  No discharge or erythema, or obvious scleral/conjunctival abnormalities.  HENT: Normal cephalic/atraumatic.  External ears, nose and mouth without ulcers or lesions.  No nasal drainage visible.  RESP: No audible wheeze, cough, or visible cyanosis.  No visible retractions or increased work of breathing.    SKIN: Visible skin clear. No significant rash, abnormal pigmentation or lesions.  NEURO: Cranial nerves grossly intact.  Mentation and speech appropriate for age.  PSYCH: Mentation appears normal, affect normal, judgement and insight intact, normal speech and appearance well-groomed.            Video-Visit Details    Type of service:  Video Visit    Video End Time:4:50 PM    Originating Location (pt. Location): Home    Distant Location " (provider location):  [facility]     Platform used for Video Visit: Charly  Answers for HPI/ROS submitted by the patient on 9/15/2021  If you checked off any problems, how difficult have these problems made it for you to do your work, take care of things at home, or get along with other people?: Somewhat difficult  PHQ9 TOTAL SCORE: 6

## 2021-09-17 ASSESSMENT — ANXIETY QUESTIONNAIRES: GAD7 TOTAL SCORE: 2

## 2021-09-22 ENCOUNTER — VIRTUAL VISIT (OUTPATIENT)
Dept: PSYCHOLOGY | Facility: CLINIC | Age: 26
End: 2021-09-22
Payer: COMMERCIAL

## 2021-09-22 DIAGNOSIS — F33.41 MAJOR DEPRESSIVE DISORDER, RECURRENT EPISODE, IN PARTIAL REMISSION (H): ICD-10-CM

## 2021-09-22 DIAGNOSIS — F90.0 ATTENTION DEFICIT HYPERACTIVITY DISORDER, INATTENTIVE TYPE: Primary | ICD-10-CM

## 2021-09-22 PROCEDURE — 90834 PSYTX W PT 45 MINUTES: CPT | Mod: GT | Performed by: PSYCHOLOGIST

## 2021-09-22 NOTE — PROGRESS NOTES
"                                           Progress Note    Patient Name: Carrillo Aceves  Date: 9/22/21         Service Type: Individual      Session Start Time: 4:00pm Session End Time: 4:51pm     Session Length: 51 minutes     Session #: 33    Attendees: Client attended alone    Service Modality:  Video Visit:      Provider verified identity through the following two step process.  Patient provided:  Patient photo    Telemedicine Visit: The patient's condition can be safely assessed and treated via synchronous audio and visual telemedicine encounter.      Reason for Telemedicine Visit: Services only offered telehealth    Originating Site (Patient Location): Patient's home    Distant Site (Provider Location): Provider Remote Setting    Consent:  The patient/guardian has verbally consented to: the potential risks and benefits of telemedicine (video visit) versus in person care; bill my insurance or make self-payment for services provided; and responsibility for payment of non-covered services.     Patient would like the video invitation sent by: Send to e-mail at: [email or web address]    Mode of Communication:  Video Conference via [employer]    As the provider I attest to compliance with applicable laws and regulations related to telemedicine.     Treatment Plan Last Reviewed: 12/16/20; Reviewed: 5/12/21  PHQ-9 / DORY-7 : 14 / 10 (on 12/3/20); 12 / 9 (on 1/27/21); 10 / 5 (on 5/12/21)    DATA  Interactive Complexity: No  Crisis: No       Progress Since Last Session (Related to Symptoms / Goals / Homework):   Symptoms: Stable: \"It's not been going too bad lately.\"    Homework: Did not complete      Episode of Care Goals: Satisfactory progress - ACTION (Actively working towards change); Intervened by reinforcing change plan / affirming steps taken     Current / Ongoing Stressors and Concerns:   Work; been sick     Treatment Objective(s) Addressed in This Session:   Client focused mainly on Goal #2 of his Treatment Plan.  He " "discussed difficulties getting to bed earlier and how sleep deprivation affects him mentally, emotionally, and physically.  Problem solved getting to bed earlier.  Client also discussed helping out his mom and uncle \"once-in-a-while\" with is nijacqui.          Notes from 21 Session:   Cl observed \"that, I think I've been staying up later at night because I know since taking  Adderall it will keep me awake.\"     Are you tired at night?  \"I would say 'yes'.  I tend to stay up until I'm so tired I'm ready to  pass out.  I think part of it is I don't feel like I get enough time to myself.  I also don't feel  like going to work or school the next day so I tend to put it off \".   Helped clarify purpose of staying up late.   Looked at his timeline of his daily \"down time.\" Problem solved.   \"I've never been good at going to bed when I'm tired.\"   \"I don't really see it as I'm tired, just something is happening.\"   Explored Pros and Cons of sleep deprivation.      HW: Client is going to make a goal of 11:00pm to go to sleep \"every day.\"      \"The other thing I was going to mention, my uncle's ex-wife  on Monday, and she had  been in the hospital since last Thursday.  My mom has been living with my uncle now, and  she's helping take care of his two kids.  I've been going there to help.  Not every weekend  but whenever my mom needs a break. I stay up later those nights (twin girls-8 y.o.).\"   Does it interfere with schooling?  \"It's a distraction.\"      **How do you challenge the thoughts that you want to stay up as late as you can and it's  beneficial to go to bed at 11:00pm at the latest.      \"I always liked the idea of working in focused windows.\"   Believes a barrier to scheduling focused (study) windows is getting assignments on a  planner.         Intervention:   CBT: Verbal Pros & Cons (Getting to sleep earlier); Problem solving; Reframe; Socratic Questioning; Pattern recognition; Psychoeducation; Assigned " homework; Helped challenge thoughts of wanting to stay up later  Active listening, clarifying questions, validation, and other rapport building skills; Encouraged collaboration with client's prescribing professional        ASSESSMENT: Current Emotional / Mental Status (status of significant symptoms):   Risk status (Self / Other harm or suicidal ideation)   Patient denies current fears or concerns for personal safety.   Patient denies current or recent suicidal ideation or behaviors.   Patient denies current or recent homicidal ideation or behaviors.   Patient denies current or recent self injurious behavior or ideation.   Patient denies other safety concerns.   Patient reports there has been no change in risk factors since their last session.     Patient reports there has been no change in protective factors since their last session.     Recommended that patient call 911 or go to the local ED should there be a change in any of these risk factors.     Appearance:   Appropriate    Eye Contact:   Fair  (tends to look away while thinking).   Psychomotor Behavior: Normal    Attitude:   Cooperative    Orientation:   All   Speech    Rate / Production: Normal/ Responsive Normal     Volume:  Normal    Mood:    Normal   Affect:    Appropriate    Thought Content:  Clear    Thought Form:  Coherent  Logical    Insight:    Fair      Medication Review:   Changes to psychiatric medications, see updated Medication List in EPIC.      Medication Compliance:   Yes     Changes in Health Issues:   None reported     Chemical Use Review:   Substance Use: Chemical use reviewed, no active concerns identified      Tobacco Use: No current tobacco use.      Diagnosis:  1. Attention-Deficit/Hyperactivity Disorder, Predominantly inattentive presentation    2. Major depressive disorder, recurrent episode, in partial remission (H)        Collateral Reports Completed:   Not Applicable    PLAN: (Patient Tasks / Therapist Tasks / Other)  Client is  "going to make a goal of 11:00pm to go to sleep \"every day.\" He will use challenges from this session to help meet his goal of 11:00pm.  Client may also purchase a physical planner to help keep school assignments/readings organized.  His next scheduled appointment is September 29, 2021.        Farhat Russell                                                         ______________________________________________________________________    Treatment Plan    Patient's Name: Carrillo Aceves  YOB: 1995    Date: 12/16/20; Reviewed and Revised: 5/12/21    DSM5 Diagnoses: 296.35 (F33.41)  Major Depressive Disorder, Recurrent Episode, In partial remission _ and With anxious distress  Psychosocial / Contextual Factors: School  WHODAS: 32 (on 12/3/20)    Referral / Collaboration:  Referral to another professional/service is not indicated at this time.    Anticipated number of session or this episode of care: 30      MeasurableTreatment Goal(s) related to diagnosis / functional impairment(s)  Goal 1: Patient will \"make a schedule for myself and whether or not I'm following that schedule.  I can identify areas I am completing and ones that I'm not.\"      I will know I've met my goal when I am completing scheduled activities on time and I'm scheduling as many activities as I can so I'm more aware of what I am doing every day.  A successful week is completing 50% of his scheduled activities in a week for two weeks.    Objective #A Client will create his weekly schedule on Sundays.   Patient will create a weekly To Do schedule.  Status: New - Date: 12/23/20     Intervention(s)  Therapist will assign homework as appropriate  Check on progress each session and help problem solve barriers to successfully completing this goal      Goal 2: Patient will develop a healthy sleep routine.    I will know I've met my goal when I can fall asleep consistently and I don't have a good reason to have disruptive sleep.  I would like to " "be able to fall asleep within ten minutes and be able to fall back asleep within ten minutes if I wake up in the middle of the night.      Objective #A Start to develop a sleep schedule (on hold for Objective #B)   Status: New - Date: 12/23/20     Patient will identify healthy sleep hygiene practices.    Intervention(s)  Therapist will assign homework as appropriate  teach about sleep hygiene/routine.    Objective #B  Patient will schedule three time per week to exercise.    Status: New - Date: 7/28/21     Intervention(s)  Therapist will assign homework as appropriate  teach skills on reaching goals.    Goal 3: Patient will \"learn to identify his emotional states.\"   Being able to identify my own emotional states.  At the end of each day record emotions throughout the day that I could identify.  To increase the range of emotions that I can identify.    Objective #A Client will list daily the emotions he was able to identify he felt that day.    Status: New - Date: 5/12/21     Patient will list emotions he felt daily.    Intervention(s)  Therapist will assign homework as appropriate  teach about emotions and how to differentiate  Identify and problem solve barriers to reaching goal.        Patient has reviewed and agreed to the above plan.      Farhat Russell  September 22, 2021  "

## 2021-09-25 ENCOUNTER — HEALTH MAINTENANCE LETTER (OUTPATIENT)
Age: 26
End: 2021-09-25

## 2021-10-05 NOTE — PROGRESS NOTES
Carrillo is a 26 year old who is being evaluated via a billable video visit.      How would you like to obtain your AVS? MyChart  If the video visit is dropped, the invitation should be resent by: Text to cell phone: on file  Will anyone else be joining your video visit? No      Video Start Time: 3:17pm    Assessment & Plan     Attention deficit hyperactivity disorder (ADHD), predominantly inattentive type  DORY (generalized anxiety disorder)  Mild major depression (H)  Carrillo is feeling well with mood and anxiety.   He is on adderall XR30mg daily, monotherapy and finds it helpful  Did discuss his missing 4-5 weeks of class and subsequent dropping of class.   Discussed late to work and staying up late. Advised to work on earlier bedtime/timeliness to work. Continue to work on with therapist.   Monitor late night goal driven behavior. Is falling asleep ok, sleeping at least 6hr.   Continue on adderall. Will refill when needed.   recheck in 2mo    Follow Up: The patient was instructed to contact clinic for worsening symptoms, non-improvement in time frame as expected/discussed, and for questions regarding medications or treatment plan. For virtual visits, the patient was advised to be seen for in person evaluation if symptoms or condition are worsening or non-improvement as expected.     No follow-ups on file.    Yamini Jernigan PA-C  [facility] GISELLA Vizcaino is a 26 year old who presents for the following health issues   History of Present Illness       Mental Health Follow-up:  Patient presents to follow-up on Depression.Patient's depression since last visit has been:  Better  The patient is not having other symptoms associated with depression.      Any significant life events: other  Patient is not feeling anxious or having panic attacks.  Patient has no concerns about alcohol or drug use.     Social History  Tobacco Use    Smoking status: Never Smoker    Smokeless tobacco: Never Used   "Vaping Use    Vaping Use: Never used  Alcohol use: No    Comment: none for past 2 years (as of 01/2019)  Drug use: No      Today's PHQ-9         PHQ-9 Total Score:     (P) 3   PHQ-9 Q9 Thoughts of better off dead/self-harm past 2 weeks :   (P) Not at all   Thoughts of suicide or self harm:      Self-harm Plan:        Self-harm Action:          Safety concerns for self or others:           He eats 0-1 servings of fruits and vegetables daily.He consumes 0 sweetened beverage(s) daily.He exercises with enough effort to increase his heart rate 20 to 29 minutes per day.  He exercises with enough effort to increase his heart rate 5 days per week.   He is taking medications regularly.     PHQ 8/26/2021 9/15/2021 10/7/2021   PHQ-9 Total Score 7 6 3   Q9: Thoughts of better off dead/self-harm past 2 weeks Not at all Not at all Not at all       DORY-7 SCORE 8/2/2021 8/23/2021 9/16/2021   Total Score - - -   Total Score 3 (minimal anxiety) 3 (minimal anxiety) 2 (minimal anxiety)   Total Score 3 3 2       Since his last visit has been off the venlafaxine for a month. Thinks he feels better being off of it. He denies feeling anxious or depressed.    He reports his mood as good. No SI/HI    Taking adderall XR 30mg once daily. Taking when he wakes up in the morning.  Feels like it is helping with focus, getting tasks done.     He reports since last visit he dropped his college class. He had missed 4-5 weeks of class and was too far behind. He attributes some of missed class to how he felt when he stopped the venlafaxine but not the only reason. He states some \"regret\" about the class but not feeling down about it.    Finds he is staying up later and not wanting to go to bed- \"wanting to do things\".   He reports he is finding more interest and desire for things lately- \"its come back\". Feeling more motivated for hygiene things (cleaning room) and self care (showering, brushing teeth, etc) than he was before. Finds his weekends have " "been busy (helping take dock out at the cabin, helping uncle's family - aunt passed away) so time to himself has been in evenings which is also why he is staying up later.    He does go to bed at midnight. Sleeps well, soundly until alarm 6:15am.     Has been late to work- oversleeping. Late to work daily by 15minutes. Employer is tolerating \"small company and then I skip lunch or stay later to get my 40hrs\".           Review of Systems   Constitutional, HEENT, cardiovascular, pulmonary, gi and gu systems are negative, except as otherwise noted.      Objective           Vitals:  No vitals were obtained today due to virtual visit.    Physical Exam   GENERAL: Healthy, alert and no distress  EYES: Eyes grossly normal to inspection.  No discharge or erythema, or obvious scleral/conjunctival abnormalities.  HENT: Normal cephalic/atraumatic.  External ears, nose and mouth without ulcers or lesions.  No nasal drainage visible.  RESP: No audible wheeze, cough, or visible cyanosis.  No visible retractions or increased work of breathing.    SKIN: Visible skin clear. No significant rash, abnormal pigmentation or lesions.  NEURO: Cranial nerves grossly intact.  Mentation and speech appropriate for age.  PSYCH: Mentation appears normal, affect normal, judgement and insight intact, normal speech and appearance well-groomed.            Video-Visit Details    Type of service:  Video Visit    Video End Time:3:40 PM    Originating Location (pt. Location): Home    Distant Location (provider location):  [facility] [employer]     Platform used for Video Visit: [employer]  Answers for HPI/ROS submitted by the patient on 10/7/2021  If you checked off any problems, how difficult have these problems made it for you to do your work, take care of things at home, or get along with other people?: Somewhat difficult  PHQ9 TOTAL SCORE: 3      "

## 2021-10-07 ENCOUNTER — VIRTUAL VISIT (OUTPATIENT)
Dept: FAMILY MEDICINE | Facility: CLINIC | Age: 26
End: 2021-10-07
Payer: COMMERCIAL

## 2021-10-07 ENCOUNTER — VIRTUAL VISIT (OUTPATIENT)
Dept: PSYCHOLOGY | Facility: CLINIC | Age: 26
End: 2021-10-07
Payer: COMMERCIAL

## 2021-10-07 DIAGNOSIS — F33.41 MAJOR DEPRESSIVE DISORDER, RECURRENT EPISODE, IN PARTIAL REMISSION (H): ICD-10-CM

## 2021-10-07 DIAGNOSIS — F90.0 ATTENTION DEFICIT HYPERACTIVITY DISORDER, INATTENTIVE TYPE: Primary | ICD-10-CM

## 2021-10-07 DIAGNOSIS — F90.0 ATTENTION DEFICIT HYPERACTIVITY DISORDER (ADHD), PREDOMINANTLY INATTENTIVE TYPE: Primary | ICD-10-CM

## 2021-10-07 DIAGNOSIS — F41.1 GAD (GENERALIZED ANXIETY DISORDER): ICD-10-CM

## 2021-10-07 DIAGNOSIS — F32.0 MILD MAJOR DEPRESSION (H): ICD-10-CM

## 2021-10-07 PROCEDURE — 90834 PSYTX W PT 45 MINUTES: CPT | Mod: GT | Performed by: PSYCHOLOGIST

## 2021-10-07 PROCEDURE — 99213 OFFICE O/P EST LOW 20 MIN: CPT | Mod: GT | Performed by: PHYSICIAN ASSISTANT

## 2021-10-07 ASSESSMENT — PATIENT HEALTH QUESTIONNAIRE - PHQ9
SUM OF ALL RESPONSES TO PHQ QUESTIONS 1-9: 3
SUM OF ALL RESPONSES TO PHQ QUESTIONS 1-9: 3
10. IF YOU CHECKED OFF ANY PROBLEMS, HOW DIFFICULT HAVE THESE PROBLEMS MADE IT FOR YOU TO DO YOUR WORK, TAKE CARE OF THINGS AT HOME, OR GET ALONG WITH OTHER PEOPLE: SOMEWHAT DIFFICULT

## 2021-10-07 NOTE — PROGRESS NOTES
"                                           Progress Note    Patient Name: Carrillo Aceves  Date: 10/7/21         Service Type: Individual      Session Start Time: 4:00pm Session End Time: 4:52pm     Session Length: 52 minutes     Session #: 34    Attendees: Client attended alone    Service Modality:  Video Visit:      Provider verified identity through the following two step process.  Patient provided:  Patient photo    Telemedicine Visit: The patient's condition can be safely assessed and treated via synchronous audio and visual telemedicine encounter.      Reason for Telemedicine Visit: Services only offered telehealth    Originating Site (Patient Location): Patient's home    Distant Site (Provider Location): Provider Remote Setting    Consent:  The patient/guardian has verbally consented to: the potential risks and benefits of telemedicine (video visit) versus in person care; bill my insurance or make self-payment for services provided; and responsibility for payment of non-covered services.     Patient would like the video invitation sent by: Send to e-mail at: [email or web address]    Mode of Communication:  Video Conference via [employer]    As the provider I attest to compliance with applicable laws and regulations related to telemedicine.     Treatment Plan Last Reviewed: 12/16/20; Reviewed: 5/12/21  PHQ-9 / DORY-7 : 14 / 10 (on 12/3/20); 12 / 9 (on 1/27/21); 10 / 5 (on 5/12/21)    DATA  Interactive Complexity: No  Crisis: No       Progress Since Last Session (Related to Symptoms / Goals / Homework):   Symptoms: Stable: \"I'm feeling pretty good right now.\"    Homework: Did not complete      Episode of Care Goals: Satisfactory progress - ACTION (Actively working towards change); Intervened by reinforcing change plan / affirming steps taken     Current / Ongoing Stressors and Concerns:   Work; been sick     Treatment Objective(s) Addressed in This Session:   Client discussed how his symptoms of ADHD may have interfered " "with school as a child and recently, prior to dropping his class.  He also made a possible connection between his ADHD medications and Wellbutrin which he recently stopped taking.  Client was helped to explore possible signs and symptoms of anxiety that lead to dropping class.  Client also discussed helping out his mom and uncle \"once-in-a-while\" with is nieces and how that may have played in role in feeling overwhelmed recently; reviewed and assigned homework         Notes from 10/7/21 Session:   \"Start with why I canceled last week.  My aunt passed away and I'm helping out with the kids.     \"I also withdrew from my class.  I was a good 3-4 weeks behind.\"  He withdrew a week ago today.  I was feeling pretty overwhelmed with helping my family and try to get caught up on class.  I had to  my nieces from school and sit with them for a few hours (almost 8 years old).\"  \"I took the rest of the week off of work.  I was feeling overwhelmed with everything going on.\"  \"Also, the two weeks before that, pretty bad side-effects from stopping Effexor.\"  \"On Tuesday, I was wanting to get caught up on homework, on Wednesday, I stayed in bed the whole day and didn't get anything done.  On Thursday, I withdrew from my class.\"  \"On Wednesday, I didn't want to do anything at all.\"    Processed what led to dropping his class.    Suggested anxiety lead to dropping his class do to \"feeling overwhelmed.\"  Explained cognitive dissonance.    \"Tuesday, I had the experience that I was going to sit down and work on one of the assignments that was due.  I sat down and didn't due anything. Nothing. \"  Thoughts turned to \"I don't think this is going to happen. I do feel like what's kind of been happening is, in the back of my head, I knew assignments were due and it stopped me from doing other things (although he didn't work on assignments). So I end up trying to waste time or not think about it.\"    \"Even in elementary school, once I " "was behind on something in school, I would never catch up.  I think coming off my medications when I fell behind it was going to snowball from there.\"  Client asked to provide examples from elementary school.    Discussed [employer] Helplessness.    What would help keep up with class?    \"I kind of told Yamini (prescribing professional) that since stopping the Effexor, my old interests and hobbies have been returning.\"    Went through [employer] regarding changing the medications.    HW: \"Right now I'm focusing on catching up on cleaning and organizing.  Things my grandma asked me To Do.\"    Not working actively getting to bed on time, \"I think I stopped caring about that once I dropped my class but I'm still interested in it.\"           Intervention:   CBT: Identified emotions (anxiety?); Verbal Pros & Cons; Problem solving; Reframe; Socratic Questioning; Pattern recognition; Psychoeducation; Assigned and reviewed homework  Active listening, clarifying questions, validation, and other rapport building skills; Encouraged collaboration with client's prescribing professional        ASSESSMENT: Current Emotional / Mental Status (status of significant symptoms):   Risk status (Self / Other harm or suicidal ideation)   Patient denies current fears or concerns for personal safety.   Patient denies current or recent suicidal ideation or behaviors.   Patient denies current or recent homicidal ideation or behaviors.   Patient denies current or recent self injurious behavior or ideation.   Patient denies other safety concerns.   Patient reports there has been no change in risk factors since their last session.     Patient reports there has been no change in protective factors since their last session.     Recommended that patient call 911 or go to the local ED should there be a change in any of these risk factors.     Appearance:   Appropriate    Eye Contact:   Fair  (tends to look away while thinking).   Psychomotor " "Behavior: Normal    Attitude:   Cooperative    Orientation:   All   Speech    Rate / Production: Normal/ Responsive Normal     Volume:  Normal    Mood:    Normal   Affect:    Appropriate    Thought Content:  Clear    Thought Form:  Coherent  Logical    Insight:    Fair      Medication Review:   Changes to psychiatric medications, see updated Medication List in EPIC.      Medication Compliance:   Yes     Changes in Health Issues:   None reported     Chemical Use Review:   Substance Use: Chemical use reviewed, no active concerns identified      Tobacco Use: No current tobacco use.      Diagnosis:  1. Attention-Deficit/Hyperactivity Disorder, Predominantly inattentive presentation    2. Major depressive disorder, recurrent episode, in partial remission (H)        Collateral Reports Completed:   Not Applicable    PLAN: (Patient Tasks / Therapist Tasks / Other)  Client is going to continue his goal of 11:00pm to go to sleep \"every day.\" He will use challenges from this session to help meet his goal of 11:00pm.  He will also focus on \"getting done with my To Do list at home. His next scheduled appointment is October 13, 2021.        Farhat Russell                                                         ______________________________________________________________________    Treatment Plan    Patient's Name: Carrillo Aceves  YOB: 1995    Date: 12/16/20; Reviewed and Revised: 5/12/21    DSM5 Diagnoses: 296.35 (F33.41)  Major Depressive Disorder, Recurrent Episode, In partial remission _ and With anxious distress  Psychosocial / Contextual Factors: School  WHODAS: 32 (on 12/3/20)    Referral / Collaboration:  Referral to another professional/service is not indicated at this time.    Anticipated number of session or this episode of care: 30      MeasurableTreatment Goal(s) related to diagnosis / functional impairment(s)  Goal 1: Patient will \"make a schedule for myself and whether or not I'm following that " "schedule.  I can identify areas I am completing and ones that I'm not.\"      I will know I've met my goal when I am completing scheduled activities on time and I'm scheduling as many activities as I can so I'm more aware of what I am doing every day.  A successful week is completing 50% of his scheduled activities in a week for two weeks.    Objective #A Client will create his weekly schedule on Sundays.   Patient will create a weekly To Do schedule.  Status: New - Date: 12/23/20     Intervention(s)  Therapist will assign homework as appropriate  Check on progress each session and help problem solve barriers to successfully completing this goal      Goal 2: Patient will develop a healthy sleep routine.    I will know I've met my goal when I can fall asleep consistently and I don't have a good reason to have disruptive sleep.  I would like to be able to fall asleep within ten minutes and be able to fall back asleep within ten minutes if I wake up in the middle of the night.      Objective #A Start to develop a sleep schedule (on hold for Objective #B)   Status: New - Date: 12/23/20     Patient will identify healthy sleep hygiene practices.    Intervention(s)  Therapist will assign homework as appropriate  teach about sleep hygiene/routine.    Objective #B  Patient will schedule three time per week to exercise.    Status: New - Date: 7/28/21     Intervention(s)  Therapist will assign homework as appropriate  teach skills on reaching goals.    Goal 3: Patient will \"learn to identify his emotional states.\"   Being able to identify my own emotional states.  At the end of each day record emotions throughout the day that I could identify.  To increase the range of emotions that I can identify.    Objective #A Client will list daily the emotions he was able to identify he felt that day.    Status: New - Date: 5/12/21     Patient will list emotions he felt daily.    Intervention(s)  Therapist will assign homework as " appropriate  teach about emotions and how to differentiate  Identify and problem solve barriers to reaching goal.        Patient has reviewed and agreed to the above plan.      Farhat Russell  October 7, 2021

## 2021-10-08 ASSESSMENT — PATIENT HEALTH QUESTIONNAIRE - PHQ9: SUM OF ALL RESPONSES TO PHQ QUESTIONS 1-9: 3

## 2021-10-11 ENCOUNTER — MYC MEDICAL ADVICE (OUTPATIENT)
Dept: FAMILY MEDICINE | Facility: CLINIC | Age: 26
End: 2021-10-11

## 2021-10-11 DIAGNOSIS — F90.0 ATTENTION DEFICIT HYPERACTIVITY DISORDER (ADHD), PREDOMINANTLY INATTENTIVE TYPE: Primary | ICD-10-CM

## 2021-10-12 RX ORDER — DEXTROAMPHETAMINE SACCHARATE, AMPHETAMINE ASPARTATE MONOHYDRATE, DEXTROAMPHETAMINE SULFATE AND AMPHETAMINE SULFATE 5; 5; 5; 5 MG/1; MG/1; MG/1; MG/1
20 CAPSULE, EXTENDED RELEASE ORAL DAILY
Qty: 30 CAPSULE | Refills: 0 | Status: SHIPPED | OUTPATIENT
Start: 2021-10-12 | End: 2022-02-14

## 2021-10-13 ENCOUNTER — VIRTUAL VISIT (OUTPATIENT)
Dept: PSYCHOLOGY | Facility: CLINIC | Age: 26
End: 2021-10-13
Payer: COMMERCIAL

## 2021-10-13 DIAGNOSIS — F33.41 MAJOR DEPRESSIVE DISORDER, RECURRENT EPISODE, IN PARTIAL REMISSION (H): ICD-10-CM

## 2021-10-13 DIAGNOSIS — F90.0 ATTENTION DEFICIT HYPERACTIVITY DISORDER, INATTENTIVE TYPE: Primary | ICD-10-CM

## 2021-10-13 PROCEDURE — 90834 PSYTX W PT 45 MINUTES: CPT | Mod: GT | Performed by: PSYCHOLOGIST

## 2021-10-13 NOTE — PROGRESS NOTES
"                                           Progress Note    Patient Name: Carrillo Aceves  Date: 10/13/21         Service Type: Individual      Session Start Time: 4:02pm Session End Time: 4:54pm     Session Length: 52 minutes     Session #: 35    Attendees: Client attended alone    Service Modality:  Video Visit:      Provider verified identity through the following two step process.  Patient provided:  Patient photo    Telemedicine Visit: The patient's condition can be safely assessed and treated via synchronous audio and visual telemedicine encounter.      Reason for Telemedicine Visit: Services only offered telehealth    Originating Site (Patient Location): Patient's home    Distant Site (Provider Location): Provider Remote Setting    Consent:  The patient/guardian has verbally consented to: the potential risks and benefits of telemedicine (video visit) versus in person care; bill my insurance or make self-payment for services provided; and responsibility for payment of non-covered services.     Patient would like the video invitation sent by: Send to e-mail at: [email or web address]    Mode of Communication:  Video Conference via Amwell    As the provider I attest to compliance with applicable laws and regulations related to telemedicine.     Treatment Plan Last Reviewed: 12/16/20; Reviewed: 5/12/21  PHQ-9 / DORY-7 : 14 / 10 (on 12/3/20); 12 / 9 (on 1/27/21); 10 / 5 (on 5/12/21)    DATA  Interactive Complexity: No  Crisis: No       Progress Since Last Session (Related to Symptoms / Goals / Homework):   Symptoms: Stable: \"I'm pretty good.\"    Homework: Partially completed      Episode of Care Goals: Satisfactory progress - ACTION (Actively working towards change); Intervened by reinforcing change plan / affirming steps taken     Current / Ongoing Stressors and Concerns:   Work     Treatment Objective(s) Addressed in This Session:   Client discussed the possible effects of his ADHD medication and expressed patterns he's " "noticed; He worked with his prescribing professional to step down to 20mg Adderall starting Friday  Client discussed goals for the next week and was helped to define his goals and steps to accomplish them         Notes from 10/13/21 Session:   Client getting to be typically by 12:00am.   \"I've had a tougher time getting to sleep at night but easier getting up in the morning.\"   At night, \"My brain won't turn off.  Think about pretty much anything.\"   Skills to try in order to sleep better:  \"I've never had any luck using meditation to help me  fall asleep.  Generally, what has happened in the past, I would try to meditate and my  mind would start wondering off onto other things.\"      \"I want to start meditating again.  I've got some stuff on my backlog right now.\"      HW: \"I wrote up an unsorted list on my phone.  I've been doing 1-2 things a day.\"   Typically does things first that he can do by himself and not have to go anywhere or talk to  anyone else in order to do it.      Use of the word \"should.\"   He's been communicating with his prescribing professional to lower his dosage of  Adderall.  \"Mostly because I've been noticing it may be disrupting my sleep.  Last weekend  I noticed I was having a hard time sitting still. Past couple days I've noticed some jaw  clenching.  I've also been doing some reading how being on a higher dose can lead to  developing a higher tolerance more quickly.  Also, last time I was on 20mg (Adderall) I  was also on Effexor.\"  He'll start on 20mg Adderall on Friday, 10/15/21.      \"I think I would like to do more on the weekend to prepare for the rest of the week.\"   \"The thing I'm most interested in is planning out meals for the week (wants to start  cooking for himself 2 - times per week). I want to be self-sufficient and not rely on  someone else to do it for me.\"    \"I don't like having people taking care of me.  I have some hang-ups about food or cooking.   I don't like " "thinking about it. If I'm hungry I tend not to eat anything.I may get anxiety about  actually cooking something.\"       HW:  Client wants to get to cooking 2-3 dinners per week and make lunches everyday.         Intervention:   CBT: Identified emotions; Problem solving; Reframe; Socratic Questioning; Pattern recognition; Psychoeducation; Assigned and reviewed homework  Mapping out a plan; Active listening, reflecting, summarizing, and other rapport building skills        ASSESSMENT: Current Emotional / Mental Status (status of significant symptoms):   Risk status (Self / Other harm or suicidal ideation)   Patient denies current fears or concerns for personal safety.   Patient denies current or recent suicidal ideation or behaviors.   Patient denies current or recent homicidal ideation or behaviors.   Patient denies current or recent self injurious behavior or ideation.   Patient denies other safety concerns.   Patient reports there has been no change in risk factors since their last session.     Patient reports there has been no change in protective factors since their last session.     Recommended that patient call 911 or go to the local ED should there be a change in any of these risk factors.     Appearance:   Appropriate    Eye Contact:   Fair  (tends to look away while thinking).   Psychomotor Behavior: Normal    Attitude:   Cooperative    Orientation:   All   Speech    Rate / Production: Normal/ Responsive Normal     Volume:  Normal    Mood:    Normal   Affect:    Appropriate    Thought Content:  Clear    Thought Form:  Coherent  Logical    Insight:    Fair      Medication Review:   Changes to psychiatric medications, see updated Medication List in EPIC.      Medication Compliance:   Yes     Changes in Health Issues:   None reported     Chemical Use Review:   Substance Use: Chemical use reviewed, no active concerns identified      Tobacco Use: No current tobacco use.      Diagnosis:  1. " "Attention-Deficit/Hyperactivity Disorder, Predominantly inattentive presentation    2. Major depressive disorder, recurrent episode, in partial remission (H)        Collateral Reports Completed:   Not Applicable    PLAN: (Patient Tasks / Therapist Tasks / Other)  Client is going to plan out one dinner and five lunches to make next week. His next scheduled appointment is October 27, 2021.        Farhat Russell                                                         ______________________________________________________________________    Treatment Plan    Patient's Name: Carrillo Aceves  YOB: 1995    Date: 12/16/20; Reviewed and Revised: 5/12/21    DSM5 Diagnoses: 296.35 (F33.41)  Major Depressive Disorder, Recurrent Episode, In partial remission _ and With anxious distress  Psychosocial / Contextual Factors: School  WHODAS: 32 (on 12/3/20)    Referral / Collaboration:  Referral to another professional/service is not indicated at this time.    Anticipated number of session or this episode of care: 30      MeasurableTreatment Goal(s) related to diagnosis / functional impairment(s)  Goal 1: Patient will \"make a schedule for myself and whether or not I'm following that schedule.  I can identify areas I am completing and ones that I'm not.\"      I will know I've met my goal when I am completing scheduled activities on time and I'm scheduling as many activities as I can so I'm more aware of what I am doing every day.  A successful week is completing 50% of his scheduled activities in a week for two weeks.    Objective #A Client will create his weekly schedule on Sundays.   Patient will create a weekly To Do schedule.  Status: New - Date: 12/23/20     Intervention(s)  Therapist will assign homework as appropriate  Check on progress each session and help problem solve barriers to successfully completing this goal      Goal 2: Patient will develop a healthy sleep routine.    I will know I've met my goal when " "I can fall asleep consistently and I don't have a good reason to have disruptive sleep.  I would like to be able to fall asleep within ten minutes and be able to fall back asleep within ten minutes if I wake up in the middle of the night.      Objective #A Start to develop a sleep schedule (on hold for Objective #B)   Status: New - Date: 12/23/20     Patient will identify healthy sleep hygiene practices.    Intervention(s)  Therapist will assign homework as appropriate  teach about sleep hygiene/routine.    Objective #B  Patient will schedule three time per week to exercise.    Status: New - Date: 7/28/21     Intervention(s)  Therapist will assign homework as appropriate  teach skills on reaching goals.    Goal 3: Patient will \"learn to identify his emotional states.\"   Being able to identify my own emotional states.  At the end of each day record emotions throughout the day that I could identify.  To increase the range of emotions that I can identify.    Objective #A Client will list daily the emotions he was able to identify he felt that day.    Status: New - Date: 5/12/21     Patient will list emotions he felt daily.    Intervention(s)  Therapist will assign homework as appropriate  teach about emotions and how to differentiate  Identify and problem solve barriers to reaching goal.        Patient has reviewed and agreed to the above plan.      Farhat Russell  October 13, 2021  "

## 2021-10-27 ENCOUNTER — VIRTUAL VISIT (OUTPATIENT)
Dept: PSYCHOLOGY | Facility: CLINIC | Age: 26
End: 2021-10-27
Payer: COMMERCIAL

## 2021-10-27 DIAGNOSIS — F90.0 ATTENTION DEFICIT HYPERACTIVITY DISORDER, INATTENTIVE TYPE: Primary | ICD-10-CM

## 2021-10-27 DIAGNOSIS — F33.41 MAJOR DEPRESSIVE DISORDER, RECURRENT EPISODE, IN PARTIAL REMISSION (H): ICD-10-CM

## 2021-10-27 PROCEDURE — 90834 PSYTX W PT 45 MINUTES: CPT | Mod: GT | Performed by: PSYCHOLOGIST

## 2021-10-27 NOTE — PROGRESS NOTES
"                                           Progress Note    Patient Name: Carrillo Aceves  Date: 10/27/21         Service Type: Individual      Session Start Time: 5:00pm Session End Time: 5:52pm     Session Length: 52 minutes     Session #: 36    Attendees: Client attended alone    Service Modality:  Video Visit:      Provider verified identity through the following two step process.  Patient provided:  Patient photo    Telemedicine Visit: The patient's condition can be safely assessed and treated via synchronous audio and visual telemedicine encounter.      Reason for Telemedicine Visit: Services only offered telehealth    Originating Site (Patient Location): Patient's home    Distant Site (Provider Location): Provider Remote Setting    Consent:  The patient/guardian has verbally consented to: the potential risks and benefits of telemedicine (video visit) versus in person care; bill my insurance or make self-payment for services provided; and responsibility for payment of non-covered services.     Patient would like the video invitation sent by: Send to e-mail at: [email or web address]    Mode of Communication:  Video Conference via Amwell    As the provider I attest to compliance with applicable laws and regulations related to telemedicine.     Treatment Plan Last Reviewed: 12/16/20; Reviewed: 5/12/21  PHQ-9 / DORY-7 : 14 / 10 (on 12/3/20); 12 / 9 (on 1/27/21); 10 / 5 (on 5/12/21); 3 (on 10/7/21) / 2 (on 9/16/21)    DATA  Interactive Complexity: No  Crisis: No       Progress Since Last Session (Related to Symptoms / Goals / Homework):   Symptoms: Stable: \"I'm alright.\"    Homework: Partially completed      Episode of Care Goals: Satisfactory progress - ACTION (Actively working towards change); Intervened by reinforcing change plan / affirming steps taken     Current / Ongoing Stressors and Concerns:   Work     Treatment Objective(s) Addressed in This Session:   Discussed with client what could be symptoms of ADHD (sleep " "difficulties, boredom at job)  Client described his difficulties with work (burnout [?]) may be affecting his sleep and desire to \"get to work on time;\" Explored the possibiity that client is struggling with depression; Explored future goals, particularly with school          Notes from 10/13/21 Session:   \"I've still been staying up pretty late, 1-2am.\"   \"I'm not sure if it has to do with my job.\"   \"I've been 20-30 minutes late (to work) for several weeks now.\"   \"I would like it to be something external instead of being a symptom of depression or  something like that.\"   \"I've been feeling irritated or frustrated now.\"   He stated he is bored with his job and does not like living with his grandparents.   \"Still having to wait awhile to go back to college, that's frustrating for me.\"   Described thoughts/feelings/behaviors when he has to go to work (pushes himself to get  up), thought: \"I don't want to go in or like I just don't care.\"   Pattern of when he takes a break from his work (to avoid), \"I tend to take more breaks  during long jobs vs small jobs.\"   \"I take fewer breaks in the morning than in the afternoon. Maybe I get fed up with what I'm  doing (later in the day).\"   \"I haven't noticed any patterns in my mood from morning to the afternoon.\"   He believes medications have been \"helpful. I do feel like it's a waste, the job I have it kind  of a waste of the medication.\"   Client wants to start studying for an IT Help Desk Certifications.   What would it be like to step into a new job using the certificate: \"It's easier for me to  answer this kind of question with what it won't be for me.  I wouldn't worry about being  able to do the job.  It would be really nice to have some novelty as far as going to a new  job location and having to wear office attire. I think it would be kind of a pain jumping  through hoops trying to find a job.\"      GOALS: Client wants to focus on applying to 4 year schools and not " "missing deadlines  and he also wants to focus on applications that require \"an essay.\"      HW:  Make a short list of schools I want to apply to.  Checking their deadlines and writing   them down.          Intervention:   CBT: Problem solving; Reframe; Socratic Questioning; Pattern recognition; Psychoeducation; Assigned and reviewed homework; Explored options; Helped plan/prioritize applying to four year schools  Mapping out a plan; Active listening, reflecting, and other rapport building skills; Differential diagnosis      ASSESSMENT: Current Emotional / Mental Status (status of significant symptoms):   Risk status (Self / Other harm or suicidal ideation)   Patient denies current fears or concerns for personal safety.   Patient denies current or recent suicidal ideation or behaviors.   Patient denies current or recent homicidal ideation or behaviors.   Patient denies current or recent self injurious behavior or ideation.   Patient denies other safety concerns.   Patient reports there has been no change in risk factors since their last session.     Patient reports there has been no change in protective factors since their last session.     Recommended that patient call 911 or go to the local ED should there be a change in any of these risk factors.     Appearance:   Appropriate    Eye Contact:   Fair  (tends to look away while thinking).   Psychomotor Behavior: Normal    Attitude:   Cooperative    Orientation:   All   Speech    Rate / Production: Normal/ Responsive Normal     Volume:  Normal    Mood:    Normal   Affect:    Appropriate    Thought Content:  Clear    Thought Form:  Coherent  Logical    Insight:    Fair      Medication Review:   Changes to psychiatric medications, see updated Medication List in EPIC.      Medication Compliance:   Yes     Changes in Health Issues:   None reported     Chemical Use Review:   Substance Use: Chemical use reviewed, no active concerns identified      Tobacco Use: No current " "tobacco use.      Diagnosis:  1. Attention-Deficit/Hyperactivity Disorder, Predominantly inattentive presentation    2. Major depressive disorder, recurrent episode, in partial remission (H)        Collateral Reports Completed:   Not Applicable    PLAN: (Patient Tasks / Therapist Tasks / Other)  Client will list his top schools he would like to apply to for next Fall.  He will also list, for each school, the application deadline. His next scheduled appointment is November 3, 2021.        Farhat Russell                                                         ______________________________________________________________________    Treatment Plan    Patient's Name: Carrillo Aceves  YOB: 1995    Date: 12/16/20; Reviewed and Revised: 5/12/21    DSM5 Diagnoses: 296.35 (F33.41)  Major Depressive Disorder, Recurrent Episode, In partial remission _ and With anxious distress  Psychosocial / Contextual Factors: School  WHODAS: 32 (on 12/3/20)    Referral / Collaboration:  Referral to another professional/service is not indicated at this time.    Anticipated number of session or this episode of care: 30      MeasurableTreatment Goal(s) related to diagnosis / functional impairment(s)  Goal 1: Patient will \"make a schedule for myself and whether or not I'm following that schedule.  I can identify areas I am completing and ones that I'm not.\"      I will know I've met my goal when I am completing scheduled activities on time and I'm scheduling as many activities as I can so I'm more aware of what I am doing every day.  A successful week is completing 50% of his scheduled activities in a week for two weeks.    Objective #A Client will create his weekly schedule on Sundays.   Patient will create a weekly To Do schedule.  Status: New - Date: 12/23/20     Intervention(s)  Therapist will assign homework as appropriate  Check on progress each session and help problem solve barriers to successfully completing this " "goal      Goal 2: Patient will develop a healthy sleep routine.    I will know I've met my goal when I can fall asleep consistently and I don't have a good reason to have disruptive sleep.  I would like to be able to fall asleep within ten minutes and be able to fall back asleep within ten minutes if I wake up in the middle of the night.      Objective #A Start to develop a sleep schedule (on hold for Objective #B)   Status: New - Date: 12/23/20     Patient will identify healthy sleep hygiene practices.    Intervention(s)  Therapist will assign homework as appropriate  teach about sleep hygiene/routine.    Objective #B  Patient will schedule three time per week to exercise.    Status: New - Date: 7/28/21     Intervention(s)  Therapist will assign homework as appropriate  teach skills on reaching goals.    Goal 3: Patient will \"learn to identify his emotional states.\"   Being able to identify my own emotional states.  At the end of each day record emotions throughout the day that I could identify.  To increase the range of emotions that I can identify.    Objective #A Client will list daily the emotions he was able to identify he felt that day.    Status: New - Date: 5/12/21     Patient will list emotions he felt daily.    Intervention(s)  Therapist will assign homework as appropriate  teach about emotions and how to differentiate  Identify and problem solve barriers to reaching goal.        Patient has reviewed and agreed to the above plan.      Farhat Russell  October 27, 2021  "

## 2021-11-10 ENCOUNTER — VIRTUAL VISIT (OUTPATIENT)
Dept: PSYCHOLOGY | Facility: CLINIC | Age: 26
End: 2021-11-10
Payer: COMMERCIAL

## 2021-11-10 DIAGNOSIS — F90.0 ATTENTION DEFICIT HYPERACTIVITY DISORDER, INATTENTIVE TYPE: Primary | ICD-10-CM

## 2021-11-10 DIAGNOSIS — F33.41 MAJOR DEPRESSIVE DISORDER, RECURRENT EPISODE, IN PARTIAL REMISSION (H): ICD-10-CM

## 2021-11-10 PROCEDURE — 90834 PSYTX W PT 45 MINUTES: CPT | Mod: GT | Performed by: PSYCHOLOGIST

## 2021-11-10 ASSESSMENT — ANXIETY QUESTIONNAIRES
GAD7 TOTAL SCORE: 4
GAD7 TOTAL SCORE: 4
3. WORRYING TOO MUCH ABOUT DIFFERENT THINGS: NOT AT ALL
6. BECOMING EASILY ANNOYED OR IRRITABLE: SEVERAL DAYS
8. IF YOU CHECKED OFF ANY PROBLEMS, HOW DIFFICULT HAVE THESE MADE IT FOR YOU TO DO YOUR WORK, TAKE CARE OF THINGS AT HOME, OR GET ALONG WITH OTHER PEOPLE?: NOT DIFFICULT AT ALL
2. NOT BEING ABLE TO STOP OR CONTROL WORRYING: NOT AT ALL
1. FEELING NERVOUS, ANXIOUS, OR ON EDGE: NOT AT ALL
7. FEELING AFRAID AS IF SOMETHING AWFUL MIGHT HAPPEN: NOT AT ALL
4. TROUBLE RELAXING: MORE THAN HALF THE DAYS
5. BEING SO RESTLESS THAT IT IS HARD TO SIT STILL: SEVERAL DAYS
7. FEELING AFRAID AS IF SOMETHING AWFUL MIGHT HAPPEN: NOT AT ALL
GAD7 TOTAL SCORE: 4

## 2021-11-10 NOTE — PROGRESS NOTES
Carrillo is a 26 year old who is being evaluated via a billable video visit.      How would you like to obtain your AVS? MyChart  If the video visit is dropped, the invitation should be resent by: Text to cell phone: [phone]    Will anyone else be joining your video visit? No      Video Start Time: 2:01 PM    Assessment & Plan     Attention deficit hyperactivity disorder (ADHD), predominantly inattentive type  Doing well on current dose of adderall.   No side effects  Mood and anxiety are controlled  He will continue seeing his therapist  Refills sent  Recheck in 3 months   - amphetamine-dextroamphetamine (ADDERALL XR) 20 MG 24 hr capsule; Take 1 capsule (20 mg) by mouth daily  - amphetamine-dextroamphetamine (ADDERALL XR) 20 MG 24 hr capsule; Take 1 capsule (20 mg) by mouth daily  - amphetamine-dextroamphetamine (ADDERALL XR) 20 MG 24 hr capsule; Take 1 capsule (20 mg) by mouth daily       Follow Up: The patient was instructed to contact clinic for worsening symptoms, non-improvement in time frame as expected/discussed, and for questions regarding medications or treatment plan. For virtual visits, the patient was advised to be seen for in person evaluation if symptoms or condition are worsening or non-improvement as expected.       Return in about 3 months (around 2/11/2022).    Yamini Jernigan PA-C  [facility] GISELLA Vizcaino is a 26 year old who presents for the following health issues     History of Present Illness       Mental Health Follow-up:  Patient presents to follow-up on Depression & Anxiety.Patient's depression since last visit has been:  Better  The patient is not having other symptoms associated with depression.  Patient's anxiety since last visit has been:  Better  The patient is not having other symptoms associated with anxiety.  Any significant life events: No  Patient is not feeling anxious or having panic attacks.  Patient has no concerns about alcohol or drug  "use.     Social History  Tobacco Use    Smoking status: Never Smoker    Smokeless tobacco: Never Used  Vaping Use    Vaping Use: Never used  Alcohol use: No    Comment: none for past 2 years (as of 01/2019)  Drug use: No      Today's PHQ-9         PHQ-9 Total Score:     (P) 6   PHQ-9 Q9 Thoughts of better off dead/self-harm past 2 weeks :   (P) Not at all   Thoughts of suicide or self harm:      Self-harm Plan:        Self-harm Action:          Safety concerns for self or others:           He eats 0-1 servings of fruits and vegetables daily.He consumes 0 sweetened beverage(s) daily.He exercises with enough effort to increase his heart rate 30 to 60 minutes per day.  He exercises with enough effort to increase his heart rate 5 days per week. He is missing 1 dose(s) of medications per week.  He is not taking prescribed medications regularly due to remembering to take.     Last visit reduced dose of aderall from XR 30mg to 20    Easier to get started on things.  Not putting things off as much  [facility] but is still struggling to get in to work on time. Has corrina talking with his therapist about it- . therapist things due to burn out and boredom at job.     Sleep has been decent.     Side effects that he had on the Addrall XR 30mg dose are gone now on the Adderall XR 20mg.    He feels mood is good. \"No negative moods at all\".     Some anxiety but not debilitating . Tends to be about plans for the future.       Review of Systems   Constitutional, HEENT, cardiovascular, pulmonary, gi and gu systems are negative, except as otherwise noted.      Objective           Vitals:  No vitals were obtained today due to virtual visit.    Physical Exam   GENERAL: Healthy, alert and no distress  EYES: Eyes grossly normal to inspection.  No discharge or erythema, or obvious scleral/conjunctival abnormalities.  HENT: Normal cephalic/atraumatic.  External ears, nose and mouth without ulcers or lesions.  " No nasal drainage visible.  RESP: No audible wheeze, cough, or visible cyanosis.  No visible retractions or increased work of breathing.    SKIN: Visible skin clear. No significant rash, abnormal pigmentation or lesions.  NEURO: Cranial nerves grossly intact.  Mentation and speech appropriate for age.  PSYCH: Mentation appears normal, affect normal, mildly flat, judgement and insight intact, normal speech and appearance well-groomed. Eye contact intermittently avoidant although video platform may be causative            Video-Visit Details    Type of service:  Video Visit    Video End Time:2:12pm    Originating Location (pt. Location): Home    Distant Location (provider location):  [facility]     Platform used for Video Visit: Solexant  Answers for HPI/ROS submitted by the patient on 11/10/2021  If you checked off any problems, how difficult have these problems made it for you to do your work, take care of things at home, or get along with other people?: Somewhat difficult  PHQ9 TOTAL SCORE: 6  DORY 7 TOTAL SCORE: 4

## 2021-11-10 NOTE — PROGRESS NOTES
Progress Note    Patient Name: Carrillo Aceves  Date: 11/10/21         Service Type: Individual      Session Start Time: 4:01pm Session End Time: 4:53pm     Session Length: 52 minutes     Session #: 37    Attendees: Client attended alone    Service Modality:  Video Visit:      Provider verified identity through the following two step process.  Patient provided:  Patient photo    Telemedicine Visit: The patient's condition can be safely assessed and treated via synchronous audio and visual telemedicine encounter.      Reason for Telemedicine Visit: Services only offered telehealth    Originating Site (Patient Location): Patient's home    Distant Site (Provider Location): Provider Remote Setting    Consent:  The patient/guardian has verbally consented to: the potential risks and benefits of telemedicine (video visit) versus in person care; bill my insurance or make self-payment for services provided; and responsibility for payment of non-covered services.     Patient would like the video invitation sent by: Send to e-mail at: [email or web address]    Mode of Communication:  Video Conference via Amwell    As the provider I attest to compliance with applicable laws and regulations related to telemedicine.     Treatment Plan Last Reviewed: 12/16/20; Reviewed: 5/12/21  PHQ-9 / DORY-7 : 14 / 10 (on 12/3/20); 12 / 9 (on 1/27/21); 10 / 5 (on 5/12/21); 3 (on 10/7/21) / 2 (on 9/16/21)    DATA  Interactive Complexity: No  Crisis: No       Progress Since Last Session (Related to Symptoms / Goals / Homework):   Symptoms: Stable    Homework: Partially completed      Episode of Care Goals: Satisfactory progress - ACTION (Actively working towards change); Intervened by reinforcing change plan / affirming steps taken     Current / Ongoing Stressors and Concerns:   Work     Treatment Objective(s) Addressed in This Session:   Explored how symptoms of ADHD may be lending to difficulties client has  "been having with work (boredom) which is also affecting his sleep.  Helped client explore his difficulty getting enough sleep which he believed was connected to work; Explored his work history; Assigned and reviewed homework         Notes from 11/10/21 Session:   \"I think I got sick because I haven't been sleeping well.\"   \"I haven't worked since last Tuesday (sick).\"   He has gone to bed \"earlier than past week.\"   Client feels more of the burnout before work and \"it varies once I'm at work.  It's worse at  work when it's a normal day, there's nothing going on but doing the work.\"   He has been there for almost three years and often \"feel bored.\"   When the client first started at his current job, he was there for 8 months and started  feeling bored.  His supervisor left, and he had to learn many new aspects of the job which  made the job interesting again.  Now it has been almost two years since his supervisor  left and client is feeling bored again.   His adversity to going into work maybe more about boredom at this point then burnout.   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  He  will listen to the books at work.  I've downloaded the asael and haven't downloaded the  books yet.\"      HW: 1) Being sick interfered with completion.  He will work on a list of 4 yr colleges and  their application deadlines.     2) He would also like to have the textbooks downloaded by the next day or two to  distract me from work.   2) \"I think that (lack of identify) might have a connection to what I want and what I end of  doing.\" -  Further discussion for next session.   4) \"I think if we can be more specific about how ADHD effects people, for example, why  people with ADHD have a hard " "time completing projects.\" - possible further discussion for  next session     Client asked if this writer fears falling into his client's type of thinking: Answered questions  and asked client clarifying questions.   Client would like this therapist    \"I've never had a strong feeling of being a person.  Like when I was depressed, I really  didn't feel like being a person at all.  Like, a (no) sense of identity.\"               Intervention:   CBT: Problem solving; Socratic Questioning; Pattern recognition; Psychoeducation; Assigned and reviewed homework  Work history; Active listening, reflecting, and other rapport building skills      ASSESSMENT: Current Emotional / Mental Status (status of significant symptoms):   Risk status (Self / Other harm or suicidal ideation)   Patient denies current fears or concerns for personal safety.   Patient denies current or recent suicidal ideation or behaviors.   Patient denies current or recent homicidal ideation or behaviors.   Patient denies current or recent self injurious behavior or ideation.   Patient denies other safety concerns.   Patient reports there has been no change in risk factors since their last session.     Patient reports there has been no change in protective factors since their last session.     Recommended that patient call 911 or go to the local ED should there be a change in any of these risk factors.     Appearance:   Appropriate    Eye Contact:   Fair  (tends to look away while thinking).   Psychomotor Behavior: Normal    Attitude:   Cooperative    Orientation:   All   Speech    Rate / Production: Normal/ Responsive Normal     Volume:  Normal    Mood:    Normal   Affect:    Appropriate    Thought Content:  Clear    Thought Form:  Coherent  Logical    Insight:    Fair      Medication Review:   Changes to psychiatric medications, see updated Medication List in EPIC.      Medication Compliance:   Yes     Changes in Health Issues:   None " "reported     Chemical Use Review:   Substance Use: Chemical use reviewed, no active concerns identified      Tobacco Use: No current tobacco use.      Diagnosis:  1. Attention-Deficit/Hyperactivity Disorder, Predominantly inattentive presentation    2. Major depressive disorder, recurrent episode, in partial remission (H)        Collateral Reports Completed:   Not Applicable    PLAN: (Patient Tasks / Therapist Tasks / Other)  Client will list his top schools he would like to apply to for next Fall.  He will also list, for each school, the application deadline. He would also like to have the textbooks downloaded by the next day or two to distract him while at work.  His next scheduled appointment is November 24, 2021.        Farhat Marroquin Drew                                                         ______________________________________________________________________    Treatment Plan    Patient's Name: Carrillo Aceves  YOB: 1995    Date: 12/16/20; Reviewed and Revised: 5/12/21    DSM5 Diagnoses: 296.35 (F33.41)  Major Depressive Disorder, Recurrent Episode, In partial remission _ and With anxious distress  Psychosocial / Contextual Factors: School  WHODAS: 32 (on 12/3/20)    Referral / Collaboration:  Referral to another professional/service is not indicated at this time.    Anticipated number of session or this episode of care: 30      MeasurableTreatment Goal(s) related to diagnosis / functional impairment(s)  Goal 1: Patient will \"make a schedule for myself and whether or not I'm following that schedule.  I can identify areas I am completing and ones that I'm not.\"      I will know I've met my goal when I am completing scheduled activities on time and I'm scheduling as many activities as I can so I'm more aware of what I am doing every day.  A successful week is completing 50% of his scheduled activities in a week for two weeks.    Objective #A Client will create his weekly schedule on " "Sundays.   Patient will create a weekly To Do schedule.  Status: New - Date: 12/23/20     Intervention(s)  Therapist will assign homework as appropriate  Check on progress each session and help problem solve barriers to successfully completing this goal      Goal 2: Patient will develop a healthy sleep routine.    I will know I've met my goal when I can fall asleep consistently and I don't have a good reason to have disruptive sleep.  I would like to be able to fall asleep within ten minutes and be able to fall back asleep within ten minutes if I wake up in the middle of the night.      Objective #A Start to develop a sleep schedule (on hold for Objective #B)   Status: New - Date: 12/23/20     Patient will identify healthy sleep hygiene practices.    Intervention(s)  Therapist will assign homework as appropriate  teach about sleep hygiene/routine.    Objective #B  Patient will schedule three time per week to exercise.    Status: New - Date: 7/28/21     Intervention(s)  Therapist will assign homework as appropriate  teach skills on reaching goals.    Goal 3: Patient will \"learn to identify his emotional states.\"   Being able to identify my own emotional states.  At the end of each day record emotions throughout the day that I could identify.  To increase the range of emotions that I can identify.    Objective #A Client will list daily the emotions he was able to identify he felt that day.    Status: New - Date: 5/12/21     Patient will list emotions he felt daily.    Intervention(s)  Therapist will assign homework as appropriate  teach about emotions and how to differentiate  Identify and problem solve barriers to reaching goal.        Patient has reviewed and agreed to the above plan.      Farhat Russell  November 10, 2021  "

## 2021-11-11 ENCOUNTER — VIRTUAL VISIT (OUTPATIENT)
Dept: FAMILY MEDICINE | Facility: CLINIC | Age: 26
End: 2021-11-11
Payer: COMMERCIAL

## 2021-11-11 DIAGNOSIS — F90.0 ATTENTION DEFICIT HYPERACTIVITY DISORDER (ADHD), PREDOMINANTLY INATTENTIVE TYPE: Primary | ICD-10-CM

## 2021-11-11 PROCEDURE — 99213 OFFICE O/P EST LOW 20 MIN: CPT | Mod: GT | Performed by: PHYSICIAN ASSISTANT

## 2021-11-11 RX ORDER — DEXTROAMPHETAMINE SACCHARATE, AMPHETAMINE ASPARTATE MONOHYDRATE, DEXTROAMPHETAMINE SULFATE AND AMPHETAMINE SULFATE 5; 5; 5; 5 MG/1; MG/1; MG/1; MG/1
20 CAPSULE, EXTENDED RELEASE ORAL DAILY
Qty: 30 CAPSULE | Refills: 0 | Status: SHIPPED | OUTPATIENT
Start: 2022-01-12 | End: 2022-02-11

## 2021-11-11 RX ORDER — DEXTROAMPHETAMINE SACCHARATE, AMPHETAMINE ASPARTATE MONOHYDRATE, DEXTROAMPHETAMINE SULFATE AND AMPHETAMINE SULFATE 5; 5; 5; 5 MG/1; MG/1; MG/1; MG/1
20 CAPSULE, EXTENDED RELEASE ORAL DAILY
Qty: 30 CAPSULE | Refills: 0 | Status: SHIPPED | OUTPATIENT
Start: 2021-12-12 | End: 2022-01-11

## 2021-11-11 RX ORDER — DEXTROAMPHETAMINE SACCHARATE, AMPHETAMINE ASPARTATE MONOHYDRATE, DEXTROAMPHETAMINE SULFATE AND AMPHETAMINE SULFATE 5; 5; 5; 5 MG/1; MG/1; MG/1; MG/1
20 CAPSULE, EXTENDED RELEASE ORAL DAILY
Qty: 30 CAPSULE | Refills: 0 | Status: SHIPPED | OUTPATIENT
Start: 2021-11-11 | End: 2021-12-11

## 2021-11-11 ASSESSMENT — ANXIETY QUESTIONNAIRES: GAD7 TOTAL SCORE: 4

## 2021-11-11 ASSESSMENT — PATIENT HEALTH QUESTIONNAIRE - PHQ9: SUM OF ALL RESPONSES TO PHQ QUESTIONS 1-9: 6

## 2021-11-24 ENCOUNTER — VIRTUAL VISIT (OUTPATIENT)
Dept: PSYCHOLOGY | Facility: CLINIC | Age: 26
End: 2021-11-24
Payer: COMMERCIAL

## 2021-11-24 DIAGNOSIS — Z53.9 NO SHOW: Primary | ICD-10-CM

## 2021-11-24 NOTE — PROGRESS NOTES
The client was a No Call/No Show for this session; therefore, no notes, no billing.    Farhat Russell PsyD, JOHANA 12/9/21

## 2021-12-01 ENCOUNTER — VIRTUAL VISIT (OUTPATIENT)
Dept: PSYCHOLOGY | Facility: CLINIC | Age: 26
End: 2021-12-01
Payer: COMMERCIAL

## 2021-12-01 DIAGNOSIS — F90.0 ATTENTION DEFICIT HYPERACTIVITY DISORDER, INATTENTIVE TYPE: Primary | ICD-10-CM

## 2021-12-01 DIAGNOSIS — F33.41 MAJOR DEPRESSIVE DISORDER, RECURRENT EPISODE, IN PARTIAL REMISSION (H): ICD-10-CM

## 2021-12-01 PROCEDURE — 90834 PSYTX W PT 45 MINUTES: CPT | Mod: GT | Performed by: PSYCHOLOGIST

## 2021-12-01 NOTE — PROGRESS NOTES
"                                           Progress Note    Patient Name: Carrillo Aceves  Date: 12/1/21         Service Type: Individual      Session Start Time: 4:04pm Session End Time: 4:56pm     Session Length: 52 minutes     Session #: 39    Attendees: Client attended alone    Service Modality:  Video Visit:      Provider verified identity through the following two step process.  Patient provided:  Patient photo    Telemedicine Visit: The patient's condition can be safely assessed and treated via synchronous audio and visual telemedicine encounter.      Reason for Telemedicine Visit: Services only offered telehealth    Originating Site (Patient Location): Patient's home    Distant Site (Provider Location): Provider Remote Setting    Consent:  The patient/guardian has verbally consented to: the potential risks and benefits of telemedicine (video visit) versus in person care; bill my insurance or make self-payment for services provided; and responsibility for payment of non-covered services.     Patient would like the video invitation sent by: Send to e-mail at: [email or web address]    Mode of Communication:  Video Conference via [employer]    As the provider I attest to compliance with applicable laws and regulations related to telemedicine.     Treatment Plan Last Reviewed: 12/16/20; Reviewed: 5/12/21  PHQ-9 / DORY-7 : 14 / 10 (on 12/3/20); 12 / 9 (on 1/27/21); 10 / 5 (on 5/12/21); 3 (on 10/7/21) / 2 (on 9/16/21)    DATA  Interactive Complexity: No  Crisis: No       Progress Since Last Session (Related to Symptoms / Goals / Homework):   Symptoms: \"I've been in a relatively good mood.\"    Homework: Partially completed      Episode of Care Goals: Satisfactory progress - ACTION (Actively working towards change); Intervened by reinforcing change plan / affirming steps taken     Current / Ongoing Stressors and Concerns:   Work     Notes from 11/10/21 Session:   Feels \"listless;\"  \"I feel like my sense of time has   He feels " "\"apathy\" has crept in some, too.  HW: He did download two textbooks to listen to at work, \"which helps being at work.  With that, I haven't been looking at other jobs anymore.\"  HW: He has not listed schools and deadlines yet, \"It's a general sense of apathy or lack of motivation.\"  Rate your motivation to return to school: \"There are times I'm very motivated to go back.  And there are other times, like, slipping back into a fog.\"  He has been getting to bed by \"11 - 11:30.\"  Client wants to exercise, however, he has never had an exercise routine except for when he played sports.  Pros & Cons of continuing to live with his grandparents  Client wanted to talk about \"not having a sense of identity.  \"Rather than say identity, I don't really feel a sense of connection to family, communities, and other people in general, I suppose.\"  He does not feel like he connects to others but they may connect to him.  He's never felt the grief of loss when he stopped seeing someone  The client stated, \"I don't like looking at myself in the mirror or looking others in the eye.  I can't see myself in the future.\"  He feels \"empty.\"  Do you like yourself?  Long pause, \"I would say that there are times I would like the way I am acting or I'm doing things I want to do.  I like when I am open-minded about someone else's perspective. I'd say I don't see myself in the same way I see other people.\"  How?  \"It feels irrelevant if I like myself.  It's not something I would normally consider thinking about.\"    HW: What brought this to your attention, concerned you that you wanted to address it in therapy?       Treatment Objective(s) Addressed in This Session:   Reviewed homework from previous week; Discussed barriers to completing; Explored client's issues with a self-identity; Client wanted to discuss his feelings of a lack of connection with others, feeling \"empty.\"                Intervention:   CBT: Pros & Cons; Problem solving; Socratic " "Questioning; Pattern recognition; Psychoeducation; Assigned and reviewed homework  Explored self-identity, feelings of \"emptiness\"; Active listening, reflecting, and other rapport building skills      ASSESSMENT: Current Emotional / Mental Status (status of significant symptoms):   Risk status (Self / Other harm or suicidal ideation)   Patient denies current fears or concerns for personal safety.   Patient denies current or recent suicidal ideation or behaviors.   Patient denies current or recent homicidal ideation or behaviors.   Patient denies current or recent self injurious behavior or ideation.   Patient denies other safety concerns.   Patient reports there has been no change in risk factors since their last session.     Patient reports there has been no change in protective factors since their last session.     Recommended that patient call 911 or go to the local ED should there be a change in any of these risk factors.     Appearance:   Appropriate    Eye Contact:   Fair  (tends to look away while thinking).   Psychomotor Behavior: Normal    Attitude:   Cooperative    Orientation:   All   Speech    Rate / Production: Normal/ Responsive Normal     Volume:  Normal    Mood:    Normal   Affect:    Appropriate    Thought Content:  Clear    Thought Form:  Coherent  Logical    Insight:    Fair      Medication Review:   Changes to psychiatric medications, see updated Medication List in EPIC.      Medication Compliance:   Yes     Changes in Health Issues:   None reported     Chemical Use Review:   Substance Use: Chemical use reviewed, no active concerns identified      Tobacco Use: No current tobacco use.      Diagnosis:  1. Attention-Deficit/Hyperactivity Disorder, Predominantly inattentive presentation    2. Major depressive disorder, recurrent episode, in partial remission (H)        Collateral Reports Completed:   Not Applicable    PLAN: (Patient Tasks / Therapist Tasks / Other)  Client will answer the question, " "What brought this to your attention, concerned you that you wanted to address it in therapy?.  His next scheduled appointment is December 9, 2021.        Farhat Marroquin Drew                                                         ______________________________________________________________________    Treatment Plan    Patient's Name: Carrillo Aceves  YOB: 1995    Date: 12/16/20; Reviewed and Revised: 5/12/21    DSM5 Diagnoses: 296.35 (F33.41)  Major Depressive Disorder, Recurrent Episode, In partial remission _ and With anxious distress  Psychosocial / Contextual Factors: School  WHODAS: 32 (on 12/3/20)    Referral / Collaboration:  Referral to another professional/service is not indicated at this time.    Anticipated number of session or this episode of care: 30      MeasurableTreatment Goal(s) related to diagnosis / functional impairment(s)  Goal 1: Patient will \"make a schedule for myself and whether or not I'm following that schedule.  I can identify areas I am completing and ones that I'm not.\"      I will know I've met my goal when I am completing scheduled activities on time and I'm scheduling as many activities as I can so I'm more aware of what I am doing every day.  A successful week is completing 50% of his scheduled activities in a week for two weeks.    Objective #A Client will create his weekly schedule on Sundays.   Patient will create a weekly To Do schedule.  Status: New - Date: 12/23/20     Intervention(s)  Therapist will assign homework as appropriate  Check on progress each session and help problem solve barriers to successfully completing this goal      Goal 2: Patient will develop a healthy sleep routine.    I will know I've met my goal when I can fall asleep consistently and I don't have a good reason to have disruptive sleep.  I would like to be able to fall asleep within ten minutes and be able to fall back asleep within ten minutes if I wake up in the middle of the night.  " "    Objective #A Start to develop a sleep schedule (on hold for Objective #B)   Status: New - Date: 12/23/20     Patient will identify healthy sleep hygiene practices.    Intervention(s)  Therapist will assign homework as appropriate  teach about sleep hygiene/routine.    Objective #B  Patient will schedule three time per week to exercise.    Status: New - Date: 7/28/21     Intervention(s)  Therapist will assign homework as appropriate  teach skills on reaching goals.    Goal 3: Patient will \"learn to identify his emotional states.\"   Being able to identify my own emotional states.  At the end of each day record emotions throughout the day that I could identify.  To increase the range of emotions that I can identify.    Objective #A Client will list daily the emotions he was able to identify he felt that day.    Status: New - Date: 5/12/21     Patient will list emotions he felt daily.    Intervention(s)  Therapist will assign homework as appropriate  teach about emotions and how to differentiate  Identify and problem solve barriers to reaching goal.        Patient has reviewed and agreed to the above plan.      Farhat Russell  December 1, 2021  "

## 2021-12-09 ENCOUNTER — VIRTUAL VISIT (OUTPATIENT)
Dept: PSYCHOLOGY | Facility: CLINIC | Age: 26
End: 2021-12-09
Payer: COMMERCIAL

## 2021-12-09 DIAGNOSIS — F90.0 ATTENTION DEFICIT HYPERACTIVITY DISORDER, INATTENTIVE TYPE: Primary | ICD-10-CM

## 2021-12-09 DIAGNOSIS — F33.41 MAJOR DEPRESSIVE DISORDER, RECURRENT EPISODE, IN PARTIAL REMISSION (H): ICD-10-CM

## 2021-12-09 PROCEDURE — 90834 PSYTX W PT 45 MINUTES: CPT | Mod: GT | Performed by: PSYCHOLOGIST

## 2021-12-09 NOTE — PROGRESS NOTES
"                                           Progress Note    Patient Name: Carrillo Aceves  Date: 12/9/21         Service Type: Individual      Session Start Time: 3:00pm Session End Time: 3:52pm     Session Length: 52 minutes     Session #: 40    Attendees: Client attended alone    Service Modality:  Video Visit:      Provider verified identity through the following two step process.  Patient provided:  Patient photo    Telemedicine Visit: The patient's condition can be safely assessed and treated via synchronous audio and visual telemedicine encounter.      Reason for Telemedicine Visit: Services only offered telehealth    Originating Site (Patient Location): Patient's home    Distant Site (Provider Location): Provider Remote Setting    Consent:  The patient/guardian has verbally consented to: the potential risks and benefits of telemedicine (video visit) versus in person care; bill my insurance or make self-payment for services provided; and responsibility for payment of non-covered services.     Patient would like the video invitation sent by: Send to e-mail at: [email or web address]    Mode of Communication:  Video Conference via [employer]    As the provider I attest to compliance with applicable laws and regulations related to telemedicine.     Treatment Plan Last Reviewed: 12/16/20; Reviewed: 5/12/21  PHQ-9 / DORY-7 : 14 / 10 (on 12/3/20); 12 / 9 (on 1/27/21); 10 / 5 (on 5/12/21); 3 (on 10/7/21) / 2 (on 9/16/21)    DATA  Interactive Complexity: No  Crisis: No       Progress Since Last Session (Related to Symptoms / Goals / Homework):   Symptoms: \"I've been sick all week.\"    Homework: Partially completed      Episode of Care Goals: Satisfactory progress - ACTION (Actively working towards change); Intervened by reinforcing change plan / affirming steps taken     Current / Ongoing Stressors and Concerns:   Client has been sick all week.        Notes from 12/9/21 Session:   Sick all week.   HW: \"Last week we talked about " "trying to nail down identity and that kind of feeling I was  trying to describe and why I feel it's important. I've been pretty interested in meditation and  Budhism for a long time.  Basically, the bottom interpretation of [employer] enlightenment is  that sensory information comes in ('fields of experiences') and are  brains process it and it  can either relate those sensory information the way they are or after they get filter through  characteristics that we would assign to it.  It's a lot like solipsism. \"   Client described his views of Gnosticism and what ressonnates with him.   \"It something I would like to work toward for spiritual and practical reasons.  What bothers about not having a firm grasp on what I call 'personhood' is, if I want to change how I think in a very fundamental way, I would have to bring it up or no that it's there or exists (personhood).  What I feel is happening to me is that like a dissociation or like a conscious repressing of memory/concious rejection of experiencing something.  It's like putting it into a corner and covering it up.  \"I feel like if I want to be on the path toward that path of enlightenment, it feels like something I have to address, not just in the long run, but I feel it affects my motivation in the short run.  I can't identify in myself this concept or perspective that I want to kind of root out and breakdown.\"  \"On a less spiritual way, it feels like to me I am not allowing myself to experience, not just emotions, but sensations in general (like laying down in a comfortable bed).  There's something getting in the way of experience that for what it is.  In the same way for emotional intimacy. It's like I'm trying to put up a barrier and block me off from what I'm seeing as external emotions and things like that.  Not that it bothers me to live that way.  It's not like something I'm consciously living that way.  It's just not consistent about the facts of what I know " "is a common human experience.\"  He feels he goes through life trying to avoid things he does not want and not pursuing things I does want.  \"It's very clear to me when I don't want to do something.  But there seems like there are a lot of things that I'm doing that I don't want to do but I'm not aware of it.  If it's something that I feel like I have to do I might dissociate a bit and block it out of my mind.\"  It's generally more difficult to know if he wants to do something or not when it involves other people or if he doesn't want to do it (\"I have to convince myself that it doesn't bother me\").    Example, 'If you don't want to go to a party but want to stay home.  Can you identify with the part that wants to stay home vs the part that does not want to go to the party?'    Example, He tends to want to read the whole novel in one sitting.  If he puts it down at all he is unlikely to finish the book.    \"On the topic of enjoyment, it's never like I would enjoy this work but I feel I need to do this instead.  It's never like, this project I don't enjoy at all so I'm going to do something else.  Activities that I enjoy almost become unbearable after awhile.    There may be a lack of purpose of \"meaning could also be relevant.  Like a general sense of apathy.\"  \"It feels like hobbies, my family, or things like that, I would describe as meaningful.\"  How would you describe it?  \"When I talk about meaning, I'm talking about a sense of importance.      Treatment Objective(s) Addressed in This Session:   Reviewed client's homework from last week; Help client gain clarity on his goals, or lack thereof, in life; Helped client express himself; Collaborated on possible new treatment goal and getting a deeper understanding of client's current needs and enlightenment.                Intervention:   CBT: Problem solving; Socratic Questioning; Pattern recognition; Psychoeducation; Assigned and reviewed homework; Worked on " "updating Treatment Plan with new goal  Explored self-identity or finding \"meaning\" (importance) in life/daily living; Active listening, reflecting, and other rapport building skills; Identifying emotions; Help expressing himself      ASSESSMENT: Current Emotional / Mental Status (status of significant symptoms):   Risk status (Self / Other harm or suicidal ideation)   Patient denies current fears or concerns for personal safety.   Patient denies current or recent suicidal ideation or behaviors.   Patient denies current or recent homicidal ideation or behaviors.   Patient denies current or recent self injurious behavior or ideation.   Patient denies other safety concerns.   Patient reports there has been no change in risk factors since their last session.     Patient reports there has been no change in protective factors since their last session.     Recommended that patient call 911 or go to the local ED should there be a change in any of these risk factors.     Appearance:   Appropriate    Eye Contact:   Fair  (tends to look away while thinking).   Psychomotor Behavior: Normal    Attitude:   Cooperative    Orientation:   All   Speech    Rate / Production: Normal/ Responsive Normal     Volume:  Normal    Mood:    Normal   Affect:    Appropriate    Thought Content:  Clear    Thought Form:  Coherent  Logical    Insight:    Fair      Medication Review:   Changes to psychiatric medications, see updated Medication List in EPIC.      Medication Compliance:   Yes     Changes in Health Issues:   None reported     Chemical Use Review:   Substance Use: Chemical use reviewed, no active concerns identified      Tobacco Use: No current tobacco use.      Diagnosis:  1. Attention-Deficit/Hyperactivity Disorder, Predominantly inattentive presentation    2. Major depressive disorder, recurrent episode, in partial remission (H)        Collateral Reports Completed:   Not Applicable    PLAN: (Patient Tasks / Therapist Tasks / " "Other)  Client will think further on the question, What brought this to your attention, concerned you that you wanted to address it in therapy?  Also, come prepared with examples of the last time he felt he did something that had meaning/importance.  His next scheduled appointment is December 15, 2021.        Farhat Russell                                                         ______________________________________________________________________    Treatment Plan    Patient's Name: Carrillo Aceves  YOB: 1995    Date: 12/16/20; Reviewed and Revised: 5/12/21    DSM5 Diagnoses: 296.35 (F33.41)  Major Depressive Disorder, Recurrent Episode, In partial remission _ and With anxious distress  Psychosocial / Contextual Factors: School  WHODAS: 32 (on 12/3/20)    Referral / Collaboration:  Referral to another professional/service is not indicated at this time.    Anticipated number of session or this episode of care: 30      MeasurableTreatment Goal(s) related to diagnosis / functional impairment(s)  Goal 1: Patient will \"make a schedule for myself and whether or not I'm following that schedule.  I can identify areas I am completing and ones that I'm not.\"      I will know I've met my goal when I am completing scheduled activities on time and I'm scheduling as many activities as I can so I'm more aware of what I am doing every day.  A successful week is completing 50% of his scheduled activities in a week for two weeks.    Objective #A Client will create his weekly schedule on Sundays.   Patient will create a weekly To Do schedule.  Status: New - Date: 12/23/20     Intervention(s)  Therapist will assign homework as appropriate  Check on progress each session and help problem solve barriers to successfully completing this goal      Goal 2: Patient will develop a healthy sleep routine.    I will know I've met my goal when I can fall asleep consistently and I don't have a good reason to have disruptive sleep. " " I would like to be able to fall asleep within ten minutes and be able to fall back asleep within ten minutes if I wake up in the middle of the night.      Objective #A Start to develop a sleep schedule (on hold for Objective #B)   Status: New - Date: 12/23/20     Patient will identify healthy sleep hygiene practices.    Intervention(s)  Therapist will assign homework as appropriate  teach about sleep hygiene/routine.    Objective #B  Patient will schedule three time per week to exercise.    Status: New - Date: 7/28/21     Intervention(s)  Therapist will assign homework as appropriate  teach skills on reaching goals.    Goal 3: Patient will \"learn to identify his emotional states.\"   Being able to identify my own emotional states.  At the end of each day record emotions throughout the day that I could identify.  To increase the range of emotions that I can identify.    Objective #A Client will list daily the emotions he was able to identify he felt that day.    Status: New - Date: 5/12/21     Patient will list emotions he felt daily.    Intervention(s)  Therapist will assign homework as appropriate  teach about emotions and how to differentiate  Identify and problem solve barriers to reaching goal.        Patient has reviewed and agreed to the above plan.      Farhat Russell  December 9, 2021  "

## 2021-12-15 ENCOUNTER — VIRTUAL VISIT (OUTPATIENT)
Dept: PSYCHOLOGY | Facility: CLINIC | Age: 26
End: 2021-12-15
Payer: COMMERCIAL

## 2021-12-15 DIAGNOSIS — F90.0 ATTENTION DEFICIT HYPERACTIVITY DISORDER, INATTENTIVE TYPE: Primary | ICD-10-CM

## 2021-12-15 DIAGNOSIS — F33.41 MAJOR DEPRESSIVE DISORDER, RECURRENT EPISODE, IN PARTIAL REMISSION (H): ICD-10-CM

## 2021-12-15 PROCEDURE — 90834 PSYTX W PT 45 MINUTES: CPT | Mod: GT | Performed by: PSYCHOLOGIST

## 2021-12-15 NOTE — PROGRESS NOTES
"                                           Progress Note    Patient Name: Carrillo Aceves  Date: 12/15/21         Service Type: Individual      Session Start Time: 4:00pm Session End Time: 4:52pm     Session Length: 52 minutes     Session #: 41    Attendees: Client attended alone    Service Modality:  Video Visit:      Provider verified identity through the following two step process.  Patient provided:  Patient photo    Telemedicine Visit: The patient's condition can be safely assessed and treated via synchronous audio and visual telemedicine encounter.      Reason for Telemedicine Visit: Services only offered telehealth    Originating Site (Patient Location): Patient's home    Distant Site (Provider Location): Provider Remote Setting    Consent:  The patient/guardian has verbally consented to: the potential risks and benefits of telemedicine (video visit) versus in person care; bill my insurance or make self-payment for services provided; and responsibility for payment of non-covered services.     Patient would like the video invitation sent by: Send to e-mail at: [email or web address]    Mode of Communication:  Video Conference via Amwell    As the provider I attest to compliance with applicable laws and regulations related to telemedicine.     Treatment Plan Last Reviewed: 12/16/20; Reviewed: 5/12/21  PHQ-9 / DORY-7 : 14 / 10 (on 12/3/20); 12 / 9 (on 1/27/21); 10 / 5 (on 5/12/21); 3 (on 10/7/21) / 2 (on 9/16/21)    DATA  Interactive Complexity: No  Crisis: No       Progress Since Last Session (Related to Symptoms / Goals / Homework):   Symptoms: \"Nighat' rough.  I'm still sick.  I took a rapid COVID test and it came back negative and took a non-rapid and it came back positive.\"    Homework: Partially completed      Episode of Care Goals: Satisfactory progress - ACTION (Actively working towards change); Intervened by reinforcing change plan / affirming steps taken     Current / Ongoing Stressors and Concerns:   Client " "has been sick since Thanksgiving.      Notes from 12/15/21 Session:   Client recently diagnosed with covid (official yesterday).  \"I think I've been sick since Thanksgiving.\"  \"I suppose most important to talk about today, I had a talk with my boss, since I've been out sick so much the last two months I'm not eligible for full-time status anymore. So, we had a meeting on Monday to sign a contract tomorrow that I'll show up on time, like attendance compliance basically.  But, I don't think I'll go back to my job. Basically, I think we've talked about not wanting to go in and, when I was there, I was fighting myself about being there. After two weeks off, it's kind of solidified I just don't want to go back.  That would entail losing health insurance. I do want to continue our sessions but I don't know how much it would be without insurance. \"    He is concerned about therapy sessions and medications going forward.  Rec he contact our billing dept to ask for options as well as his medication company.    I applied at [employer] for a couple classes and still go to a new 4-year school next Fall ('22).    \"I've been kind of feeling a lot of things the past few days, a lot of anxiety, troubling falling asleep.\"    Trouble sleeping connected to how to tell his boss he is quitting, \"I think part of the reason I am so anxious is because I worry that I can't really commit to doing anything for more than a couple years. Like how I burnt myself out in college after two years.  I'm just concerned about whether I can maintain long-term commitments.\"    Vocational testing.  Education on ADHD.    \"I think what also makes me anxious about losing my job specifically is feeling like I don't have any...the lack of (work) stability for one.  Financially I've got plenty of savings right now.  I just have this general sense of insecurity about money.\"    He worries about what he is qualified to do right now.  He stated it would be " "helpful to break 'failures' down into individual components instead of looking at it as a whole and taking it personally (\"I'm a failure.\").    HW: How does your new ADHD perspective change your thought process with your struggles.  Call [employer] and [employer] since he is losing insurance.        Treatment Objective(s) Addressed in This Session:   Educated client on symptoms and typical struggles of ADHD and helped relate it to his current and recent struggles; had discussion with client that it may be important to find a job that suits his strengths and needs due to ADHD  Explored client's fears about his future job prospects  Reviewed client's homework from last week; Helped client express himself; Discussed client's concerns about quitting his current job and losing health insurance; he would like to keep going to therapy and medications-rec.he call [facility]'s billing dept and write to the drug company asking for solutions             Intervention:   CBT: Problem solving; Socratic Questioning; Pattern recognition; Psychoeducation (particularly around ADHD); Assigned and reviewed homework; Helped client understand the effects of having ADHD - Reframing struggles and \"failure\";  Role-modeled for client increased self-empathy and understanding; Active listening, empathy, reflecting, and other rapport building skills; Identifying emotions; Help expressing himself      ASSESSMENT: Current Emotional / Mental Status (status of significant symptoms):   Risk status (Self / Other harm or suicidal ideation)   Patient denies current fears or concerns for personal safety.   Patient denies current or recent suicidal ideation or behaviors.   Patient denies current or recent homicidal ideation or behaviors.   Patient denies current or recent self injurious behavior or ideation.   Patient denies other safety concerns.   Patient reports there has been no change in risk factors since their last session.     Patient " reports there has been no change in protective factors since their last session.     Recommended that patient call 911 or go to the local ED should there be a change in any of these risk factors.     Appearance:   Appropriate    Eye Contact:   Fair  (tends to look away while thinking).   Psychomotor Behavior: Normal    Attitude:   Cooperative    Orientation:   All   Speech    Rate / Production: Normal/ Responsive Normal     Volume:  Normal    Mood:    Normal   Affect:    Appropriate    Thought Content:  Clear    Thought Form:  Coherent  Logical    Insight:    Fair      Medication Review:   Changes to psychiatric medications, see updated Medication List in EPIC.      Medication Compliance:   Yes     Changes in Health Issues:   None reported     Chemical Use Review:   Substance Use: Chemical use reviewed, no active concerns identified      Tobacco Use: No current tobacco use.      Diagnosis:  1. Attention-Deficit/Hyperactivity Disorder, Predominantly inattentive presentation    2. Major depressive disorder, recurrent episode, in partial remission (H)        Collateral Reports Completed:   Not Applicable    PLAN: (Patient Tasks / Therapist Tasks / Other)  Client will answer the question, How does your new ADHD perspective change your thought process with your struggles.  Also, he will call [facility]'s Billing Department and the company of the medications he is taking to find out his options since he is losing his health insurance.  His next scheduled appointment is December 22, 2021.        Farhat Russell                                                         ______________________________________________________________________    Treatment Plan    Patient's Name: Carrillo Aceves  YOB: 1995    Date: 12/16/20; Reviewed and Revised: 5/12/21    DSM5 Diagnoses: 296.35 (F33.41)  Major Depressive Disorder, Recurrent Episode, In partial remission _ and With anxious distress  Psychosocial / Contextual  "Factors: School  WHODAS: 32 (on 12/3/20)    Referral / Collaboration:  Referral to another professional/service is not indicated at this time.    Anticipated number of session or this episode of care: 30      MeasurableTreatment Goal(s) related to diagnosis / functional impairment(s)  Goal 1: Patient will \"make a schedule for myself and whether or not I'm following that schedule.  I can identify areas I am completing and ones that I'm not.\"      I will know I've met my goal when I am completing scheduled activities on time and I'm scheduling as many activities as I can so I'm more aware of what I am doing every day.  A successful week is completing 50% of his scheduled activities in a week for two weeks.    Objective #A Client will create his weekly schedule on Sundays.   Patient will create a weekly To Do schedule.  Status: New - Date: 12/23/20     Intervention(s)  Therapist will assign homework as appropriate  Check on progress each session and help problem solve barriers to successfully completing this goal      Goal 2: Patient will develop a healthy sleep routine.    I will know I've met my goal when I can fall asleep consistently and I don't have a good reason to have disruptive sleep.  I would like to be able to fall asleep within ten minutes and be able to fall back asleep within ten minutes if I wake up in the middle of the night.      Objective #A Start to develop a sleep schedule (on hold for Objective #B)   Status: New - Date: 12/23/20     Patient will identify healthy sleep hygiene practices.    Intervention(s)  Therapist will assign homework as appropriate  teach about sleep hygiene/routine.    Objective #B  Patient will schedule three time per week to exercise.    Status: New - Date: 7/28/21     Intervention(s)  Therapist will assign homework as appropriate  teach skills on reaching goals.    Goal 3: Patient will \"learn to identify his emotional states.\"   Being able to identify my own emotional " states.  At the end of each day record emotions throughout the day that I could identify.  To increase the range of emotions that I can identify.    Objective #A Client will list daily the emotions he was able to identify he felt that day.    Status: New - Date: 5/12/21     Patient will list emotions he felt daily.    Intervention(s)  Therapist will assign homework as appropriate  teach about emotions and how to differentiate  Identify and problem solve barriers to reaching goal.        Patient has reviewed and agreed to the above plan.      Farhat Russell  December 15, 2021

## 2021-12-22 ENCOUNTER — VIRTUAL VISIT (OUTPATIENT)
Dept: PSYCHOLOGY | Facility: CLINIC | Age: 26
End: 2021-12-22
Payer: COMMERCIAL

## 2021-12-22 DIAGNOSIS — F33.41 MAJOR DEPRESSIVE DISORDER, RECURRENT EPISODE, IN PARTIAL REMISSION (H): ICD-10-CM

## 2021-12-22 DIAGNOSIS — F90.0 ATTENTION DEFICIT HYPERACTIVITY DISORDER, INATTENTIVE TYPE: Primary | ICD-10-CM

## 2021-12-22 PROCEDURE — 90834 PSYTX W PT 45 MINUTES: CPT | Mod: GT | Performed by: PSYCHOLOGIST

## 2021-12-22 ASSESSMENT — ANXIETY QUESTIONNAIRES
GAD7 TOTAL SCORE: 7
6. BECOMING EASILY ANNOYED OR IRRITABLE: SEVERAL DAYS
IF YOU CHECKED OFF ANY PROBLEMS ON THIS QUESTIONNAIRE, HOW DIFFICULT HAVE THESE PROBLEMS MADE IT FOR YOU TO DO YOUR WORK, TAKE CARE OF THINGS AT HOME, OR GET ALONG WITH OTHER PEOPLE: SOMEWHAT DIFFICULT
1. FEELING NERVOUS, ANXIOUS, OR ON EDGE: MORE THAN HALF THE DAYS
2. NOT BEING ABLE TO STOP OR CONTROL WORRYING: SEVERAL DAYS
5. BEING SO RESTLESS THAT IT IS HARD TO SIT STILL: SEVERAL DAYS
3. WORRYING TOO MUCH ABOUT DIFFERENT THINGS: NOT AT ALL
4. TROUBLE RELAXING: MORE THAN HALF THE DAYS
7. FEELING AFRAID AS IF SOMETHING AWFUL MIGHT HAPPEN: NOT AT ALL

## 2021-12-22 ASSESSMENT — PATIENT HEALTH QUESTIONNAIRE - PHQ9: SUM OF ALL RESPONSES TO PHQ QUESTIONS 1-9: 8

## 2021-12-22 NOTE — PROGRESS NOTES
"                                           Progress Note    Patient Name: Carrillo Aceves  Date: 12/22/21         Service Type: Individual      Session Start Time: 4:02pm Session End Time: 4:51pm     Session Length: 49 minutes     Session #: 42    Attendees: Client attended alone    Service Modality:  Video Visit:      Provider verified identity through the following two step process.  Patient provided:  Patient photo    Telemedicine Visit: The patient's condition can be safely assessed and treated via synchronous audio and visual telemedicine encounter.      Reason for Telemedicine Visit: Services only offered telehealth    Originating Site (Patient Location): Patient's home    Distant Site (Provider Location): Provider Remote Setting    Consent:  The patient/guardian has verbally consented to: the potential risks and benefits of telemedicine (video visit) versus in person care; bill my insurance or make self-payment for services provided; and responsibility for payment of non-covered services.     Patient would like the video invitation sent by: Send to e-mail at: [email or web address]    Mode of Communication:  Video Conference via Amwell    As the provider I attest to compliance with applicable laws and regulations related to telemedicine.     Treatment Plan Last Reviewed: 12/16/20; Reviewed: 5/12/21  PHQ-9 / DORY-7 : 14 / 10 (on 12/3/20); 12 / 9 (on 1/27/21); 10 / 5 (on 5/12/21); 3 (on 10/7/21) / 2 (on 9/16/21)    DATA  Interactive Complexity: No  Crisis: No       Progress Since Last Session (Related to Symptoms / Goals / Homework):   Symptoms: \"I have more energy now.\"    Homework: Partially completed      Episode of Care Goals: Satisfactory progress - ACTION (Actively working towards change); Intervened by reinforcing change plan / affirming steps taken     Current / Ongoing Stressors and Concerns:   Client losing insurance at the end of the year; Last session with this writer; [employer] Going to [employer] starting in " "January '22.         Notes from 12/22/21 Session:   Last session due to loss of insurance, he will be billed $297.+ for each  session without insurance.   What would be helpful today? \"Maybe do a post-mortem of why I was burnt out  at my job.  Maybe I can use how I felt at my last job to know what to do going  forward if it happens again at another job (burnout?)\"   \"We were working a lot of overtime for along time this Summer. I don't know if  I thought about wanting to quit at the time.  At some point, something changed  and all of a sudden there was a mental block at how hard I could push myself,  like running into an invisible wall.  I was aware at things I could be doing better  but I couldn't bring myself to do them.  Like when I as taking classes at [employer].   Kind of how I felt the last semester I was there. \" - Cl provided examples.     Cl identified a Red Flag to burnout in the future is: \"An inability to make  Changes.  An overall feeling of stumbling down one path. I don't have any  motivation or interest in meeting goals.  Being trapped and doing only what I  absolutely have to.\" (Stuck in a routine).      Tied his above experiences to possibly depression and ADHD.  Seeing earlier  signs to burnout - \"With the job I just left, there were several different points I  considered leaving.\"     Enc him to work on identifying emotions/feelings.   \"I'm feeling a lot better about applying to schools (for Fall '22) now that I'm not  working.\"          Treatment Objective(s) Addressed in This Session:   Due to losing his insurance, this is the client's last session for his foreseaable future.  He found out each uninsured session would cost almost $300 which the client cannot afford.  He was asked how to best spend his last session for now and he responded, \"Maybe do a post-mortem of why I was burnt out at my job.  Maybe I can use how I felt at my last job to know what to do going forward if it happens again at " "another job (burnout?).\"  Helped client identify patterns, Red Flags, educated on how ADHD and depression can contribute to difficulties, and a possible path going forward.             Intervention:   CBT: Problem solving; Socratic Questioning; Pattern recognition; Psychoeducation (particularly around ADHD); Identified Red Flags to burnout, explored contributing factors  Identifed areas client can focus on to help manage symptoms (emotion identification, find a job that varies from day-to-day : Helps manage symptoms of ADHD); Active Listening, empathy, reflecting, and other rapport building skills      ASSESSMENT: Current Emotional / Mental Status (status of significant symptoms):   Risk status (Self / Other harm or suicidal ideation)   Patient denies current fears or concerns for personal safety.   Patient denies current or recent suicidal ideation or behaviors.   Patient denies current or recent homicidal ideation or behaviors.   Patient denies current or recent self injurious behavior or ideation.   Patient denies other safety concerns.   Patient reports there has been no change in risk factors since their last session.     Patient reports there has been no change in protective factors since their last session.     Recommended that patient call 911 or go to the local ED should there be a change in any of these risk factors.     Appearance:   Appropriate    Eye Contact:   Fair  (tends to look away while thinking).   Psychomotor Behavior: Normal    Attitude:   Cooperative    Orientation:   All   Speech    Rate / Production: Normal/ Responsive Normal     Volume:  Normal    Mood:    Normal   Affect:    Appropriate    Thought Content:  Clear    Thought Form:  Coherent  Logical    Insight:    Fair      Medication Review:   Changes to psychiatric medications, see updated Medication List in EPIC.      Medication Compliance:   Yes     Changes in Health Issues:   None reported     Chemical Use Review:   Substance Use: " "Chemical use reviewed, no active concerns identified      Tobacco Use: No current tobacco use.      Diagnosis:  1. Attention-Deficit/Hyperactivity Disorder, Predominantly inattentive presentation    2. Major depressive disorder, recurrent episode, in partial remission (H)        Collateral Reports Completed:   Not Applicable    PLAN: (Patient Tasks / Therapist Tasks / Other)  It was recommended that client check into his new college, [employer], and find out if they have a student counseling center for free counseling.  It was also recommended if he does look for a job to find one that varies in duties and day-to-day activities due to ADHD.  Client was informed he is welcomed to make a future appointment with the writer if needed in the future.        Farhat Russell                                                         ______________________________________________________________________    Treatment Plan    Patient's Name: Carrillo Aceves  YOB: 1995    Date: 12/16/20; Reviewed and Revised: 5/12/21    DSM5 Diagnoses: 296.35 (F33.41)  Major Depressive Disorder, Recurrent Episode, In partial remission _ and With anxious distress  Psychosocial / Contextual Factors: School  WHODAS: 32 (on 12/3/20)    Referral / Collaboration:  Referral to another professional/service is not indicated at this time.    Anticipated number of session or this episode of care: 30      MeasurableTreatment Goal(s) related to diagnosis / functional impairment(s)  Goal 1: Patient will \"make a schedule for myself and whether or not I'm following that schedule.  I can identify areas I am completing and ones that I'm not.\"      I will know I've met my goal when I am completing scheduled activities on time and I'm scheduling as many activities as I can so I'm more aware of what I am doing every day.  A successful week is completing 50% of his scheduled activities in a week for two weeks.    Objective #A Client will create his " "weekly schedule on Sundays.   Patient will create a weekly To Do schedule.  Status: New - Date: 12/23/20     Intervention(s)  Therapist will assign homework as appropriate  Check on progress each session and help problem solve barriers to successfully completing this goal      Goal 2: Patient will develop a healthy sleep routine.    I will know I've met my goal when I can fall asleep consistently and I don't have a good reason to have disruptive sleep.  I would like to be able to fall asleep within ten minutes and be able to fall back asleep within ten minutes if I wake up in the middle of the night.      Objective #A Start to develop a sleep schedule (on hold for Objective #B)   Status: New - Date: 12/23/20     Patient will identify healthy sleep hygiene practices.    Intervention(s)  Therapist will assign homework as appropriate  teach about sleep hygiene/routine.    Objective #B  Patient will schedule three time per week to exercise.    Status: New - Date: 7/28/21     Intervention(s)  Therapist will assign homework as appropriate  teach skills on reaching goals.    Goal 3: Patient will \"learn to identify his emotional states.\"   Being able to identify my own emotional states.  At the end of each day record emotions throughout the day that I could identify.  To increase the range of emotions that I can identify.    Objective #A Client will list daily the emotions he was able to identify he felt that day.    Status: New - Date: 5/12/21     Patient will list emotions he felt daily.    Intervention(s)  Therapist will assign homework as appropriate  teach about emotions and how to differentiate  Identify and problem solve barriers to reaching goal.        Patient has reviewed and agreed to the above plan.      Farhat Russell  December 22, 2021  "

## 2021-12-23 ASSESSMENT — ANXIETY QUESTIONNAIRES: GAD7 TOTAL SCORE: 7

## 2022-02-10 NOTE — PROGRESS NOTES
Carrillo is a 26 year old who is being evaluated via a billable video visit.      How would you like to obtain your AVS? [employer]  If the video visit is dropped, the invitation should be resent by: Text to cell phone: [phone]  Will anyone else be joining your video visit? No    Video Start Time: 1:28pm    Assessment & Plan       Attention deficit hyperactivity disorder (ADHD), predominantly inattentive type  Pt quit his job to go back to school.   He is therefore without insurance.   He thinks he may have found a benefit program or coupon for his XR formulation.  We discussed transitioning from adderall Xr 20mg to adderall 10mg BID short acting if out of pocket cost is better on short acting.   He will send a message via [employer] Mess[employer]. Therefore only 1 mo fill given at this time.   - amphetamine-dextroamphetamine (ADDERALL XR) 20 MG 24 hr capsule; Take 1 capsule (20 mg) by mouth daily    Recurrent major depressive disorder, in full remission (H)  He feels mood is good and depression is in remission. Has been off venlafaxine for number of months.  He is no longer seeing his psychologist since he is without insurance.   He is managing depression and anxiety w/strategies learned in counseling.   He will follow up if worsening.     Follow Up: The patient was instructed to contact clinic for worsening symptoms, non-improvement in time frame as expected/discussed, and for questions regarding medications or treatment plan. For virtual visits, the patient was advised to be seen for in person evaluation if symptoms or condition are worsening or non-improvement as expected.       No follow-ups on file.    Yamini Jernigan PA-C  [facility] [place] is a 26 year old who presents for the following health issues     HPI     Medication Followup of  Adderall     Taking Medication as prescribed: yes.     Side Effects:  None    Medication Helping Symptoms:  Yes    Patient is uninsured at the moment.   "    Since last visit quit my job and applied to [employer]- Spring term.   Plans to apply to 4 yr college.   Taking math classes- stats and probability. 1 mo into classes- online.   Getting assignments in on time- grades are fine.     Stable on regimen: adderall XR 20mg daily. No recent dose changes.   Control of symptoms: I feel it is going well. Maybe restless feeling initially 1-2 hr after taking it. Has helped with school and also with sleep schedule. Sleep midnight- up at 8am.     Mood- \"Bit more anxious lately with college applications\". But \"moodwise nothing to complain about\".     Not seeing therapist now - lost insurance when jyothi job for school.     Finds it very helpful with focus, attention to detail, follow through.   Denies CV sxs of palpitations, CP, tachycardia.   Denies weight loss, appetite issues, sleep trouble.   Denies GI upset.   Denies irritability, emotional lability, tics.     Had Covid after thanksgiving. [place] like fatigue never went away completely. Still gets sore throat that comes and goes. He feels those things \"have made it hard to stick with exercise regularly\".   No CP when he had covid.       Review of Systems   Constitutional, HEENT, cardiovascular, pulmonary, gi and gu systems are negative, except as otherwise noted.      Objective           Vitals:  No vitals were obtained today due to virtual visit.    Physical Exam   GENERAL: Healthy, alert and no distress  EYES: Eyes grossly normal to inspection.  No discharge or erythema, or obvious scleral/conjunctival abnormalities.  HENT: Normal cephalic/atraumatic.  External ears, nose and mouth without ulcers or lesions.  No nasal drainage visible.  RESP: No audible wheeze, cough, or visible cyanosis.  No visible retractions or increased work of breathing.    SKIN: Visible skin clear. No significant rash, abnormal pigmentation or lesions.  NEURO: Cranial nerves grossly intact.  Mentation and speech appropriate for " age.  PSYCH: Mentation appears normal, affect normal, mildly flat, eye contact although via video is intermittently avoidant; judgement and insight intact, normal speech and appearance well-groomed.          Video-Visit Details    Type of service:  Video Visit    Video End Time:1:44 PM    Originating Location (pt. Location): Home    Distant Location (provider location):  [facility]     Platform used for Video Visit: [employer]

## 2022-02-14 ENCOUNTER — VIRTUAL VISIT (OUTPATIENT)
Dept: FAMILY MEDICINE | Facility: CLINIC | Age: 27
End: 2022-02-14

## 2022-02-14 DIAGNOSIS — F90.0 ATTENTION DEFICIT HYPERACTIVITY DISORDER (ADHD), PREDOMINANTLY INATTENTIVE TYPE: Primary | ICD-10-CM

## 2022-02-14 DIAGNOSIS — F33.42 RECURRENT MAJOR DEPRESSIVE DISORDER, IN FULL REMISSION (H): ICD-10-CM

## 2022-02-14 PROBLEM — F33.41 MAJOR DEPRESSIVE DISORDER, RECURRENT EPISODE, IN PARTIAL REMISSION (H): Status: RESOLVED | Noted: 2022-02-14 | Resolved: 2022-02-14

## 2022-02-14 PROBLEM — F41.1 GAD (GENERALIZED ANXIETY DISORDER): Chronic | Status: ACTIVE | Noted: 2020-05-11

## 2022-02-14 PROBLEM — F33.41 MAJOR DEPRESSIVE DISORDER, RECURRENT EPISODE, IN PARTIAL REMISSION (H): Status: ACTIVE | Noted: 2022-02-14

## 2022-02-14 PROCEDURE — 99213 OFFICE O/P EST LOW 20 MIN: CPT | Mod: 95 | Performed by: PHYSICIAN ASSISTANT

## 2022-02-14 RX ORDER — DEXTROAMPHETAMINE SACCHARATE, AMPHETAMINE ASPARTATE MONOHYDRATE, DEXTROAMPHETAMINE SULFATE AND AMPHETAMINE SULFATE 5; 5; 5; 5 MG/1; MG/1; MG/1; MG/1
20 CAPSULE, EXTENDED RELEASE ORAL DAILY
Qty: 30 CAPSULE | Refills: 0 | Status: SHIPPED | OUTPATIENT
Start: 2022-02-14

## 2022-03-19 ENCOUNTER — MYC MEDICAL ADVICE (OUTPATIENT)
Dept: FAMILY MEDICINE | Facility: CLINIC | Age: 27
End: 2022-03-19

## 2022-03-19 NOTE — LETTER
[facility] [place]  [address], [address]  [place] [address]  [phone]       April 20, 2022    Carrillo Aceves  [address]  [place] [address]    Dear Carrillo,    This questionnaire is about depression for your upcoming visit or contact, and your care team may not see this information before then.  We care about you.  If at any time you feel unsafe or have concerns about the safety of others please take immediate action by calling [phone], for mental health crisis phone support 24 hours a day, 365 days per year.  As always, you can also go to your local ER, or call 911 if you have immediate safety concerns.    Please complete the enclosed questionnaire and return to us at the address above.    Thank you for trusting [facility] [place] and we appreciate the opportunity to serve you.  We look forward to supporting your healthcare needs in the future.    Healthy Regards,    Your [facility] Team

## 2022-04-03 NOTE — TELEPHONE ENCOUNTER
Patient Quality Outreach    Patient is due for the following:   Physical  - due now  Immunizations  -  Covid, HPV and Influenza  PHQ9 due 4/24/22     NEXT STEPS:   Schedule a yearly physical    Type of outreach:    Sent [employer] message.      Questions for provider review:    None     Amelia Pereira, CMA

## 2022-04-11 NOTE — TELEPHONE ENCOUNTER
Patient Quality Outreach    Patient is due for the following:   Depression  -  PHQ-9 Needed Due by 4/24    NEXT STEPS:   No follow up needed at this time.    Type of outreach:    Call to update PHQ/DORY.  Patient has read [employer] about physical.  Route back to me if unable to reach. Can close if schedules or declines.      Next Steps:  Reach out within 90 days via [employer].    Max number of attempts reached: Yes. Will try again in 90 days if patient still on fail list.    Questions for provider review:    None     Amelia Pereira [facility]  Chart routed to Care Team.

## 2022-04-12 NOTE — TELEPHONE ENCOUNTER
Left message for pt to return call, when call is returned give information below and help schedule physical.  Transfer to MA to do PHQ9/DORY or have pt log into [employer] to complete questionnaires.    Amelia Pereira CMA ([facility])

## 2022-05-07 ENCOUNTER — HEALTH MAINTENANCE LETTER (OUTPATIENT)
Age: 27
End: 2022-05-07

## 2022-08-29 ENCOUNTER — MYC MEDICAL ADVICE (OUTPATIENT)
Dept: FAMILY MEDICINE | Facility: CLINIC | Age: 27
End: 2022-08-29

## 2022-08-29 NOTE — LETTER
[facility]  [address], [address]  [place] [address]  Phone: [phone]  Fax: [phone]  September 14, 2022      Carrillo Aceves  [address] [place] [place] [address]      Dear Carrillo,    We care about your health and have reviewed your health plan including your medical conditions, medications, and lab results.  Based on this review, it is recommended that you follow up regarding the following health topic(s):  -Depression  -Wellness (Physical) Visit   -Immunzations:   Health Maintenance Due   Topic Date Due     COVID-19 Vaccine (3 - Booster for Pfizer series) 11/26/2021     HPV IMMUNIZATION (3 - Male 3-dose series) 12/02/2021     INFLUENZA VACCINE (1) 09/01/2022     We recommend you take the following action(s):  -Complete and return the attached PHQ-9 Form.  If your total score is greater than 9, please schedule a followup appointment.  If you answer Yes to question 9, call your clinic between the hours of 8 to 5.  You may also call the Suicide Hotline at 4-453-981-ALDH (4151) any time.  -Schedule your annual physical     Please call us at the [facility] [phone] (or use [employer]) to address the above recommendations.     Thank you for trusting [facility] and we appreciate the opportunity to serve you.  We look forward to supporting your healthcare needs in the future.    Healthy Regards,    Your Health Care Team  [facility]

## 2022-08-29 NOTE — TELEPHONE ENCOUNTER
Patient Quality Outreach    Patient is due for the following:   Depression  -  PHQ-9 needed  Physical Preventive Adult Physical      Topic Date Due     COVID-19 Vaccine (3 - Booster for Pfizer series) 11/26/2021     HPV Vaccine (3 - Male 3-dose series) 12/02/2021     Flu Vaccine (1) 09/01/2022       Next Steps:   Schedule a Well Child Check    Type of outreach:    Sent [employer] message.      Questions for provider review:    None     Amelia Pereira, [facility]  Chart routed to Care Team.

## 2022-09-07 NOTE — TELEPHONE ENCOUNTER
Patient Quality Outreach    Patient is due for the following:   Depression  -  PHQ-9 needed  Physical Preventive Adult Physical           Topic Date Due     COVID-19 Vaccine (3 - Booster for Pfizer series) 11/26/2021     HPV Vaccine (3 - Male 3-dose series) 12/02/2021     Flu Vaccine (1) 09/01/2022       Next Steps:   Schedule a Adult Preventative    Type of outreach:    Phone, left message for patient/parent to call back.    Next Steps:  Reach out within 90 days via [employer]. Can close and mail phq-9    Max number of attempts reached: Yes. Will try again in 90 days if patient still on fail list.    Questions for provider review:    None     Safia Argueta  Chart routed to Care Team.

## 2022-09-15 ENCOUNTER — VIRTUAL VISIT (OUTPATIENT)
Dept: FAMILY MEDICINE | Facility: CLINIC | Age: 27
End: 2022-09-15

## 2022-09-15 DIAGNOSIS — F90.0 ATTENTION DEFICIT HYPERACTIVITY DISORDER (ADHD), PREDOMINANTLY INATTENTIVE TYPE: Primary | ICD-10-CM

## 2022-09-15 PROCEDURE — 99213 OFFICE O/P EST LOW 20 MIN: CPT | Mod: 95 | Performed by: PHYSICIAN ASSISTANT

## 2022-09-15 RX ORDER — DEXTROAMPHETAMINE SACCHARATE, AMPHETAMINE ASPARTATE MONOHYDRATE, DEXTROAMPHETAMINE SULFATE AND AMPHETAMINE SULFATE 5; 5; 5; 5 MG/1; MG/1; MG/1; MG/1
20 CAPSULE, EXTENDED RELEASE ORAL DAILY
Qty: 30 CAPSULE | Refills: 0 | Status: SHIPPED | OUTPATIENT
Start: 2022-09-15 | End: 2022-10-15

## 2022-09-15 RX ORDER — DEXTROAMPHETAMINE SACCHARATE, AMPHETAMINE ASPARTATE MONOHYDRATE, DEXTROAMPHETAMINE SULFATE AND AMPHETAMINE SULFATE 5; 5; 5; 5 MG/1; MG/1; MG/1; MG/1
20 CAPSULE, EXTENDED RELEASE ORAL DAILY
Qty: 30 CAPSULE | Refills: 0 | Status: SHIPPED | OUTPATIENT
Start: 2022-10-16 | End: 2022-11-15

## 2022-09-15 RX ORDER — DEXTROAMPHETAMINE SACCHARATE, AMPHETAMINE ASPARTATE MONOHYDRATE, DEXTROAMPHETAMINE SULFATE AND AMPHETAMINE SULFATE 5; 5; 5; 5 MG/1; MG/1; MG/1; MG/1
20 CAPSULE, EXTENDED RELEASE ORAL DAILY
Qty: 30 CAPSULE | Refills: 0 | Status: SHIPPED | OUTPATIENT
Start: 2022-11-16 | End: 2022-12-16

## 2022-09-15 ASSESSMENT — PATIENT HEALTH QUESTIONNAIRE - PHQ9
SUM OF ALL RESPONSES TO PHQ QUESTIONS 1-9: 4
SUM OF ALL RESPONSES TO PHQ QUESTIONS 1-9: 4
10. IF YOU CHECKED OFF ANY PROBLEMS, HOW DIFFICULT HAVE THESE PROBLEMS MADE IT FOR YOU TO DO YOUR WORK, TAKE CARE OF THINGS AT HOME, OR GET ALONG WITH OTHER PEOPLE: SOMEWHAT DIFFICULT

## 2022-09-15 NOTE — PROGRESS NOTES
Carrillo is a 27 year old who is being evaluated via a billable video visit.      How would you like to obtain your AVS? MyChart  If the video visit is dropped, the invitation should be resent by: Text to cell phone: [phone]  Will anyone else be joining your video visit? No          Assessment & Plan     Attention deficit hyperactivity disorder (ADHD), predominantly inattentive type  Previously tolerated dose well.   Refilled x 3 months.   Recheck in  o - end Nov 2022. Sooner if concerns.    - amphetamine-dextroamphetamine (ADDERALL XR) 20 MG 24 hr capsule; Take 1 capsule (20 mg) by mouth daily for 30 days  - amphetamine-dextroamphetamine (ADDERALL XR) 20 MG 24 hr capsule; Take 1 capsule (20 mg) by mouth daily for 30 days  - amphetamine-dextroamphetamine (ADDERALL XR) 20 MG 24 hr capsule; Take 1 capsule (20 mg) by mouth daily for 30 days                 No follow-ups on file.    Yamini Jernigan PA-C  [facility] [place] is a 27 year old, presenting for the following health issues:  Recheck Medication (amphetamine-dextroamphetamine (ADDERALL XR) 20 MG 24 hr capsule)      HPI     Medication Followup of amphetamine-dextroamphetamine (ADDERALL XR) 20 MG 24 hr capsule    Taking Medication as prescribed: Patient not currently taking medication would like to discuss starting again.        Side Effects:  None    Medication Helping Symptoms:  not applicable    Last visit Nov 2021 (10 mo ago)    Started new job -  not overly physical. M-Thursday. About 1-2months.   Moved out to new apartment. Mother will be moving in with him in a few months.     Stopped adderall when he had covid and long covid in Feb 2022.   He was without medications at that time and was uninsured.   Still without insurance but he doesn't think he plans to get it. Thinks would be more costly than just paying for his visits and     He feels he did well on the adderall XR 20mg daily. Would like to try  restarting at that dose.     Feels mood is good. Denies any depression.   Anxiety mild at times. Not affecting sleep or activities or job performance.      Review of Systems   Constitutional, HEENT, cardiovascular, pulmonary, gi and gu systems are negative, except as otherwise noted.      Objective           Vitals:  No vitals were obtained today due to virtual visit.    Physical Exam   GENERAL: Healthy, alert and no distress  EYES: Eyes grossly normal to inspection.  No discharge or erythema, or obvious scleral/conjunctival abnormalities.  HENT: Normal cephalic/atraumatic.  External ears, nose and mouth without ulcers or lesions.  No nasal drainage visible.  RESP: No audible wheeze, cough, or visible cyanosis.  No visible retractions or increased work of breathing.    SKIN: Visible skin clear. No significant rash, abnormal pigmentation or lesions.  NEURO: Cranial nerves grossly intact.  Mentation and speech appropriate for age.  PSYCH: Mentation appears normal, affect normal, mildly flat, judgement and insight intact, normal speech and appearance well-groomed.            Video-Visit Details    Video Start Time: 4:03pm    Type of service:  Video Visit    Video End Time:4:18 PM    Originating Location (pt. Location): Home    Distant Location (provider location):  [facility]     Platform used for Video Visit: [employer]

## 2022-12-15 ENCOUNTER — VIRTUAL VISIT (OUTPATIENT)
Dept: FAMILY MEDICINE | Facility: CLINIC | Age: 27
End: 2022-12-15
Payer: COMMERCIAL

## 2022-12-15 DIAGNOSIS — F90.0 ATTENTION DEFICIT HYPERACTIVITY DISORDER (ADHD), PREDOMINANTLY INATTENTIVE TYPE: Primary | ICD-10-CM

## 2022-12-15 PROCEDURE — 99213 OFFICE O/P EST LOW 20 MIN: CPT | Mod: GT | Performed by: PHYSICIAN ASSISTANT

## 2022-12-15 RX ORDER — DEXTROAMPHETAMINE SACCHARATE, AMPHETAMINE ASPARTATE MONOHYDRATE, DEXTROAMPHETAMINE SULFATE AND AMPHETAMINE SULFATE 5; 5; 5; 5 MG/1; MG/1; MG/1; MG/1
20 CAPSULE, EXTENDED RELEASE ORAL DAILY
Qty: 30 CAPSULE | Refills: 0 | Status: SHIPPED | OUTPATIENT
Start: 2023-01-15 | End: 2023-02-14

## 2022-12-15 RX ORDER — DEXTROAMPHETAMINE SACCHARATE, AMPHETAMINE ASPARTATE MONOHYDRATE, DEXTROAMPHETAMINE SULFATE AND AMPHETAMINE SULFATE 5; 5; 5; 5 MG/1; MG/1; MG/1; MG/1
20 CAPSULE, EXTENDED RELEASE ORAL DAILY
Qty: 30 CAPSULE | Refills: 0 | Status: SHIPPED | OUTPATIENT
Start: 2022-12-15 | End: 2023-01-14

## 2022-12-15 RX ORDER — DEXTROAMPHETAMINE SACCHARATE, AMPHETAMINE ASPARTATE MONOHYDRATE, DEXTROAMPHETAMINE SULFATE AND AMPHETAMINE SULFATE 5; 5; 5; 5 MG/1; MG/1; MG/1; MG/1
20 CAPSULE, EXTENDED RELEASE ORAL DAILY
Qty: 30 CAPSULE | Refills: 0 | Status: SHIPPED | OUTPATIENT
Start: 2023-02-15 | End: 2023-03-17

## 2022-12-15 ASSESSMENT — ANXIETY QUESTIONNAIRES
4. TROUBLE RELAXING: SEVERAL DAYS
3. WORRYING TOO MUCH ABOUT DIFFERENT THINGS: NOT AT ALL
6. BECOMING EASILY ANNOYED OR IRRITABLE: SEVERAL DAYS
GAD7 TOTAL SCORE: 4
8. IF YOU CHECKED OFF ANY PROBLEMS, HOW DIFFICULT HAVE THESE MADE IT FOR YOU TO DO YOUR WORK, TAKE CARE OF THINGS AT HOME, OR GET ALONG WITH OTHER PEOPLE?: SOMEWHAT DIFFICULT
7. FEELING AFRAID AS IF SOMETHING AWFUL MIGHT HAPPEN: NOT AT ALL
5. BEING SO RESTLESS THAT IT IS HARD TO SIT STILL: SEVERAL DAYS
GAD7 TOTAL SCORE: 4
1. FEELING NERVOUS, ANXIOUS, OR ON EDGE: SEVERAL DAYS
7. FEELING AFRAID AS IF SOMETHING AWFUL MIGHT HAPPEN: NOT AT ALL
IF YOU CHECKED OFF ANY PROBLEMS ON THIS QUESTIONNAIRE, HOW DIFFICULT HAVE THESE PROBLEMS MADE IT FOR YOU TO DO YOUR WORK, TAKE CARE OF THINGS AT HOME, OR GET ALONG WITH OTHER PEOPLE: SOMEWHAT DIFFICULT
2. NOT BEING ABLE TO STOP OR CONTROL WORRYING: NOT AT ALL

## 2022-12-15 NOTE — PROGRESS NOTES
Carrillo is a 27 year old who is being evaluated via a billable video visit.      How would you like to obtain your AVS? MyChart  If the video visit is dropped, the invitation should be resent by: Text to cell phone: [phone]  Will anyone else be joining your video visit? No    Patient completed E-Check in. Questionnaires blown in and patient checked in without being called.     Assessment & Plan     Attention deficit hyperactivity disorder (ADHD), predominantly inattentive type  Doing well on dose. Stable. No side effects.   Refilled x 3 months   - amphetamine-dextroamphetamine (ADDERALL XR) 20 MG 24 hr capsule; Take 1 capsule (20 mg) by mouth daily for 30 days  - amphetamine-dextroamphetamine (ADDERALL XR) 20 MG 24 hr capsule; Take 1 capsule (20 mg) by mouth daily for 30 days  - amphetamine-dextroamphetamine (ADDERALL XR) 20 MG 24 hr capsule; Take 1 capsule (20 mg) by mouth daily for 30 days     Follow Up: The patient was instructed to contact clinic for worsening symptoms, non-improvement in time frame as expected/discussed, and for questions regarding medications or treatment plan. For virtual visits, the patient was advised to be seen for in person evaluation if symptoms or condition are worsening or non-improvement as expected.       No follow-ups on file.    [facility] [place] is a 27 year old presenting for the following health issues:  No chief complaint on file.      History of Present Illness       Reason for visit:  Medication refill    He eats 0-1 servings of fruits and vegetables daily.He consumes 0 sweetened beverage(s) daily.He exercises with enough effort to increase his heart rate 30 to 60 minutes per day.  He exercises with enough effort to increase his heart rate 4 days per week.   He is taking medications regularly.  Today's DORY-7 Score: 4       ADHD- adderall refill  Last video visit Sept 2022.   I have been doing pretty well.    Restarted adderall XR 20mg once daily.  No side effects.   Has been effective.     Job was new summer 2022- feels it is going well.   No work issues    Sleep has been decent.  Mood has been good  A little anxious on weekends when at home  Exercise- not outside of work.       Review of Systems   Constitutional, HEENT, cardiovascular, pulmonary, gi and gu systems are negative, except as otherwise noted.      Objective           Vitals:  No vitals were obtained today due to virtual visit.    Physical Exam   GENERAL: Healthy, alert and no distress  EYES: Eyes grossly normal to inspection.  No discharge or erythema, or obvious scleral/conjunctival abnormalities.  HENT: Normal cephalic/atraumatic.  External ears, nose and mouth without ulcers or lesions.  No nasal drainage visible.  RESP: No audible wheeze, cough, or visible cyanosis.  No visible retractions or increased work of breathing.    SKIN: Visible skin clear. No significant rash, abnormal pigmentation or lesions.  NEURO: Cranial nerves grossly intact.  Mentation and speech appropriate for age.  PSYCH: Mentation appears normal, affect normal/bright, judgement and insight intact, normal speech and appearance well-groomed.            Video-Visit Details    Video Start Time: 4:01pm    Type of service:  Video Visit    Video End Time:4:08pm    Originating Location (pt. Location): Home        Distant Location (provider location):  Off-site    Platform used for Video Visit: "[employer]

## 2023-01-07 ENCOUNTER — HEALTH MAINTENANCE LETTER (OUTPATIENT)
Age: 28
End: 2023-01-07

## 2023-06-02 ENCOUNTER — HEALTH MAINTENANCE LETTER (OUTPATIENT)
Age: 28
End: 2023-06-02

## 2024-06-29 ENCOUNTER — HEALTH MAINTENANCE LETTER (OUTPATIENT)
Age: 29
End: 2024-06-29

## 2025-07-12 ENCOUNTER — HEALTH MAINTENANCE LETTER (OUTPATIENT)
Age: 30
End: 2025-07-12